# Patient Record
Sex: MALE | Race: WHITE | Employment: OTHER | URBAN - METROPOLITAN AREA
[De-identification: names, ages, dates, MRNs, and addresses within clinical notes are randomized per-mention and may not be internally consistent; named-entity substitution may affect disease eponyms.]

---

## 2020-09-03 ENCOUNTER — OFFICE VISIT (OUTPATIENT)
Dept: FAMILY MEDICINE CLINIC | Facility: CLINIC | Age: 75
End: 2020-09-03
Payer: MEDICARE

## 2020-09-03 VITALS
TEMPERATURE: 97 F | HEART RATE: 80 BPM | DIASTOLIC BLOOD PRESSURE: 64 MMHG | SYSTOLIC BLOOD PRESSURE: 102 MMHG | HEIGHT: 70.5 IN | RESPIRATION RATE: 20 BRPM | BODY MASS INDEX: 29.5 KG/M2 | WEIGHT: 208.38 LBS

## 2020-09-03 DIAGNOSIS — S61.412A LACERATION OF LEFT HAND WITHOUT FOREIGN BODY, INITIAL ENCOUNTER: ICD-10-CM

## 2020-09-03 DIAGNOSIS — E11.9 TYPE 2 DIABETES MELLITUS WITHOUT COMPLICATION, WITHOUT LONG-TERM CURRENT USE OF INSULIN (HCC): Primary | ICD-10-CM

## 2020-09-03 DIAGNOSIS — S60.222A TRAUMATIC HEMATOMA OF HAND, LEFT, INITIAL ENCOUNTER: ICD-10-CM

## 2020-09-03 LAB
ALBUMIN SERPL-MCNC: 3.7 G/DL (ref 3.4–5)
ALBUMIN/GLOB SERPL: 1.1 {RATIO} (ref 1–2)
ALP LIVER SERPL-CCNC: 56 U/L (ref 45–117)
ALT SERPL-CCNC: 58 U/L (ref 16–61)
ANION GAP SERPL CALC-SCNC: 7 MMOL/L (ref 0–18)
AST SERPL-CCNC: 37 U/L (ref 15–37)
BILIRUB SERPL-MCNC: 0.4 MG/DL (ref 0.1–2)
BUN BLD-MCNC: 20 MG/DL (ref 7–18)
BUN/CREAT SERPL: 15.2 (ref 10–20)
CALCIUM BLD-MCNC: 9.8 MG/DL (ref 8.5–10.1)
CHLORIDE SERPL-SCNC: 105 MMOL/L (ref 98–112)
CHOLEST SMN-MCNC: 152 MG/DL (ref ?–200)
CO2 SERPL-SCNC: 23 MMOL/L (ref 21–32)
CREAT BLD-MCNC: 1.32 MG/DL (ref 0.7–1.3)
EST. AVERAGE GLUCOSE BLD GHB EST-MCNC: 143 MG/DL (ref 68–126)
GLOBULIN PLAS-MCNC: 3.5 G/DL (ref 2.8–4.4)
GLUCOSE BLD-MCNC: 133 MG/DL (ref 70–99)
HBA1C MFR BLD HPLC: 6.6 % (ref ?–5.7)
HDLC SERPL-MCNC: 32 MG/DL (ref 40–59)
LDLC SERPL CALC-MCNC: 77 MG/DL (ref ?–100)
M PROTEIN MFR SERPL ELPH: 7.2 G/DL (ref 6.4–8.2)
NONHDLC SERPL-MCNC: 120 MG/DL (ref ?–130)
OSMOLALITY SERPL CALC.SUM OF ELEC: 285 MOSM/KG (ref 275–295)
PATIENT FASTING Y/N/NP: YES
PATIENT FASTING Y/N/NP: YES
POTASSIUM SERPL-SCNC: 4.2 MMOL/L (ref 3.5–5.1)
SODIUM SERPL-SCNC: 135 MMOL/L (ref 136–145)
TRIGL SERPL-MCNC: 213 MG/DL (ref 30–149)
TSI SER-ACNC: 2.46 MIU/ML (ref 0.36–3.74)
VLDLC SERPL CALC-MCNC: 43 MG/DL (ref 0–30)

## 2020-09-03 PROCEDURE — 80061 LIPID PANEL: CPT | Performed by: FAMILY MEDICINE

## 2020-09-03 PROCEDURE — 80053 COMPREHEN METABOLIC PANEL: CPT | Performed by: FAMILY MEDICINE

## 2020-09-03 PROCEDURE — 99203 OFFICE O/P NEW LOW 30 MIN: CPT | Performed by: FAMILY MEDICINE

## 2020-09-03 PROCEDURE — 83036 HEMOGLOBIN GLYCOSYLATED A1C: CPT | Performed by: FAMILY MEDICINE

## 2020-09-03 PROCEDURE — 84443 ASSAY THYROID STIM HORMONE: CPT | Performed by: FAMILY MEDICINE

## 2020-09-03 RX ORDER — CHLORAL HYDRATE 500 MG
1 CAPSULE ORAL DAILY
COMMUNITY

## 2020-09-03 RX ORDER — FENOFIBRATE 160 MG/1
160 TABLET ORAL EVERY EVENING
COMMUNITY
End: 2021-02-13

## 2020-09-03 RX ORDER — PHENOL 1.4 %
2 AEROSOL, SPRAY (ML) MUCOUS MEMBRANE NIGHTLY
COMMUNITY

## 2020-09-03 RX ORDER — ROPINIROLE 5 MG/1
5 TABLET, FILM COATED ORAL 3 TIMES DAILY
COMMUNITY

## 2020-09-03 RX ORDER — MAG HYDROX/ALUMINUM HYD/SIMETH 400-400-40
1 SUSPENSION, ORAL (FINAL DOSE FORM) ORAL 2 TIMES DAILY
COMMUNITY
End: 2021-02-16

## 2020-09-03 RX ORDER — GLIPIZIDE 5 MG/1
10 TABLET ORAL
COMMUNITY
End: 2021-04-01

## 2020-09-03 RX ORDER — TAMSULOSIN HYDROCHLORIDE 0.4 MG/1
0.4 CAPSULE ORAL EVERY EVENING
COMMUNITY

## 2020-09-03 RX ORDER — ROSUVASTATIN CALCIUM 20 MG/1
10 TABLET, COATED ORAL NIGHTLY
COMMUNITY
End: 2021-06-16

## 2020-09-03 RX ORDER — DULOXETIN HYDROCHLORIDE 30 MG/1
30 CAPSULE, DELAYED RELEASE ORAL 2 TIMES DAILY
COMMUNITY
End: 2021-02-18

## 2020-09-03 NOTE — PROGRESS NOTES
Jaja Chacon is a 76year old male. HPI:   Farrukh Dunn is here for evaluation of a wound he sustained a laceration to his left hand after he cut it with a knife, while trying to cut a piece of plastic was seen at [facility] ER and placed on ABX, and given a Tdap. Resp 20   Ht 70.5\"   Wt 208 lb 6.4 oz (94.5 kg)   BMI 29.48 kg/m²   GENERAL: well developed, well nourished,in no apparent distress  SKIN: has a healed laceration of the left palm between the thumb and the index finger  EXTREMITIES: no cyanosis, clubbin

## 2020-09-04 ENCOUNTER — TELEPHONE (OUTPATIENT)
Dept: FAMILY MEDICINE CLINIC | Facility: CLINIC | Age: 75
End: 2020-09-04

## 2020-09-04 DIAGNOSIS — E11.9 TYPE 2 DIABETES MELLITUS WITHOUT COMPLICATION, WITHOUT LONG-TERM CURRENT USE OF INSULIN (HCC): Primary | ICD-10-CM

## 2020-09-04 NOTE — TELEPHONE ENCOUNTER
----- Message from Vickie Morris DO sent at 9/4/2020  8:10 AM CDT -----  Can notify Sujit Garcia, his labs actually look good his BS control is excellent, cholesterol looks good, TG could be better, so let’s watch our fatty and fried foods.  Kidney function is acce

## 2020-10-08 ENCOUNTER — OFFICE VISIT (OUTPATIENT)
Dept: FAMILY MEDICINE CLINIC | Facility: CLINIC | Age: 75
End: 2020-10-08
Payer: MEDICARE

## 2020-10-08 ENCOUNTER — MED REC SCAN ONLY (OUTPATIENT)
Dept: FAMILY MEDICINE CLINIC | Facility: CLINIC | Age: 75
End: 2020-10-08

## 2020-10-08 ENCOUNTER — HOSPITAL ENCOUNTER (OUTPATIENT)
Dept: GENERAL RADIOLOGY | Age: 75
Discharge: HOME | End: 2020-10-08
Attending: FAMILY MEDICINE
Payer: MEDICARE

## 2020-10-08 VITALS
HEART RATE: 84 BPM | RESPIRATION RATE: 20 BRPM | WEIGHT: 212.63 LBS | BODY MASS INDEX: 30.1 KG/M2 | DIASTOLIC BLOOD PRESSURE: 80 MMHG | TEMPERATURE: 97 F | HEIGHT: 70.5 IN | SYSTOLIC BLOOD PRESSURE: 122 MMHG

## 2020-10-08 DIAGNOSIS — G89.29 CHRONIC PAIN OF LEFT KNEE: ICD-10-CM

## 2020-10-08 DIAGNOSIS — G89.29 CHRONIC MIDLINE LOW BACK PAIN WITH BILATERAL SCIATICA: ICD-10-CM

## 2020-10-08 DIAGNOSIS — E11.42 DIABETIC POLYNEUROPATHY ASSOCIATED WITH TYPE 2 DIABETES MELLITUS (HCC): ICD-10-CM

## 2020-10-08 DIAGNOSIS — M25.562 CHRONIC PAIN OF LEFT KNEE: ICD-10-CM

## 2020-10-08 DIAGNOSIS — M54.41 CHRONIC MIDLINE LOW BACK PAIN WITH BILATERAL SCIATICA: ICD-10-CM

## 2020-10-08 DIAGNOSIS — M54.42 CHRONIC MIDLINE LOW BACK PAIN WITH BILATERAL SCIATICA: ICD-10-CM

## 2020-10-08 DIAGNOSIS — N21.0 BLADDER STONES: Primary | ICD-10-CM

## 2020-10-08 DIAGNOSIS — R26.81 GAIT INSTABILITY: ICD-10-CM

## 2020-10-08 DIAGNOSIS — Z87.442 HISTORY OF KIDNEY STONES: Primary | ICD-10-CM

## 2020-10-08 DIAGNOSIS — G25.81 RESTLESS LEGS: ICD-10-CM

## 2020-10-08 DIAGNOSIS — N40.1 BENIGN PROSTATIC HYPERPLASIA WITH NOCTURIA: ICD-10-CM

## 2020-10-08 DIAGNOSIS — R35.1 BENIGN PROSTATIC HYPERPLASIA WITH NOCTURIA: ICD-10-CM

## 2020-10-08 PROBLEM — E11.9 CONTROLLED TYPE 2 DIABETES MELLITUS WITHOUT COMPLICATION, WITHOUT LONG-TERM CURRENT USE OF INSULIN (HCC): Status: ACTIVE | Noted: 2020-10-08

## 2020-10-08 PROBLEM — Z72.0 TOBACCO ABUSE: Status: ACTIVE | Noted: 2020-10-08

## 2020-10-08 PROBLEM — E78.2 MIXED HYPERLIPIDEMIA: Status: ACTIVE | Noted: 2020-10-08

## 2020-10-08 PROCEDURE — 72110 X-RAY EXAM L-2 SPINE 4/>VWS: CPT | Performed by: FAMILY MEDICINE

## 2020-10-08 PROCEDURE — G0008 ADMIN INFLUENZA VIRUS VAC: HCPCS | Performed by: FAMILY MEDICINE

## 2020-10-08 PROCEDURE — 99215 OFFICE O/P EST HI 40 MIN: CPT | Performed by: FAMILY MEDICINE

## 2020-10-08 PROCEDURE — 90662 IIV NO PRSV INCREASED AG IM: CPT | Performed by: FAMILY MEDICINE

## 2020-10-08 PROCEDURE — 73560 X-RAY EXAM OF KNEE 1 OR 2: CPT | Performed by: FAMILY MEDICINE

## 2020-10-08 NOTE — PROGRESS NOTES
Carlos Adams is a 76year old male. HPI:   Sanju Garcia has a long and slightly complicated HS recurrent UTI, bladder stones, colonized with Pseudomonas, and had seen ID who said not to treat unless symptomatic. He has some balance issues and a ?  Of park REVIEW OF SYSTEMS:   GENERAL HEALTH: feels well otherwise   HEENT: hearing loss  SKIN: denies any unusual skin lesions or rashes  RESPIRATORY:  shortness of breath with exertion, is still smoking  CARDIOVASCULAR: denies chest pain on exertion  GI: keyshawn encounter       Imaging & Consults:  FLU VACC HIGH DOSE PRSV FREE  OP REFERRAL TO [facility]     The patient indicates understanding of these issues and agrees to the plan.   The patient is asked to return in after we get his imagine ba

## 2020-10-15 ENCOUNTER — TELEPHONE (OUTPATIENT)
Dept: ORTHOPEDICS CLINIC | Facility: CLINIC | Age: 75
End: 2020-10-15

## 2020-10-15 DIAGNOSIS — M25.562 LEFT KNEE PAIN, UNSPECIFIED CHRONICITY: Primary | ICD-10-CM

## 2020-10-15 NOTE — TELEPHONE ENCOUNTER
Patient self scheduled for left knee and left hand pain. He has a left knee x-rays from 10. 8.20 and no imaging of hand. Please let me know if he needs additional imaging for his knee.  Let me know if you want me to add hand as a chief complaint, if not I I

## 2020-10-16 ENCOUNTER — HOSPITAL ENCOUNTER (OUTPATIENT)
Dept: GENERAL RADIOLOGY | Age: 75
Discharge: HOME | End: 2020-10-16
Attending: NURSE PRACTITIONER
Payer: MEDICARE

## 2020-10-16 ENCOUNTER — OFFICE VISIT (OUTPATIENT)
Dept: ORTHOPEDICS CLINIC | Facility: CLINIC | Age: 75
End: 2020-10-16
Payer: MEDICARE

## 2020-10-16 VITALS — HEART RATE: 72 BPM | BODY MASS INDEX: 30 KG/M2 | HEIGHT: 70.5 IN | OXYGEN SATURATION: 98 % | RESPIRATION RATE: 18 BRPM

## 2020-10-16 DIAGNOSIS — M17.12 ARTHRITIS OF LEFT KNEE: Primary | ICD-10-CM

## 2020-10-16 DIAGNOSIS — M25.562 CHRONIC PAIN OF LEFT KNEE: ICD-10-CM

## 2020-10-16 DIAGNOSIS — M25.562 LEFT KNEE PAIN, UNSPECIFIED CHRONICITY: ICD-10-CM

## 2020-10-16 DIAGNOSIS — G89.29 CHRONIC PAIN OF LEFT KNEE: ICD-10-CM

## 2020-10-16 PROCEDURE — S0020 INJECTION, BUPIVICAINE HYDRO: HCPCS | Performed by: NURSE PRACTITIONER

## 2020-10-16 PROCEDURE — 20610 DRAIN/INJ JOINT/BURSA W/O US: CPT | Performed by: NURSE PRACTITIONER

## 2020-10-16 PROCEDURE — 73564 X-RAY EXAM KNEE 4 OR MORE: CPT | Performed by: NURSE PRACTITIONER

## 2020-10-16 PROCEDURE — 99203 OFFICE O/P NEW LOW 30 MIN: CPT | Performed by: NURSE PRACTITIONER

## 2020-10-16 RX ORDER — TRIAMCINOLONE ACETONIDE 40 MG/ML
40 INJECTION, SUSPENSION INTRA-ARTICULAR; INTRAMUSCULAR ONCE
Status: COMPLETED | OUTPATIENT
Start: 2020-10-16 | End: 2020-10-16

## 2020-10-16 RX ORDER — MELOXICAM 7.5 MG/1
7.5 TABLET ORAL DAILY
Qty: 30 TABLET | Refills: 0 | Status: SHIPPED | OUTPATIENT
Start: 2020-10-16 | End: 2020-11-15

## 2020-10-16 RX ADMIN — BUPIVACAINE HYDROCHLORIDE 10 MG: 5 INJECTION, SOLUTION PERINEURAL at 12:00:00

## 2020-10-16 RX ADMIN — TRIAMCINOLONE ACETONIDE 40 MG: 40 INJECTION, SUSPENSION INTRA-ARTICULAR; INTRAMUSCULAR at 12:00:00

## 2020-10-16 RX ADMIN — LIDOCAINE HYDROCHLORIDE 2 ML: 10 INJECTION, SOLUTION EPIDURAL; INFILTRATION; INTRACAUDAL; PERINEURAL at 12:00:00

## 2020-10-16 NOTE — PATIENT INSTRUCTIONS
What Is Arthritis? Arthritis is a disease that affects the joints. Joints are the parts where bones meet and move. It can affect any joint in your body. There are many types of arthritis.  They include:   · Osteoarthritis  · Rheumatoid arthritis  · Gout  · Cartilage is a smooth substance that protects the ends of your bones and provides cushioning. When you have arthritis, this cartilage breaks down and can no longer protect your bones. This can happen from an autoimmune disease.  Or it can happen from wear a · Strengthening muscles around the joint to reduce the strain on the joint  · Using hot and cold packs on your joints  · Using over-the-counter and prescription medicines  Talk with your healthcare provider about the best treatments for your condition. In people with arthritis, it offers all of those benefits and it can:  · Lessen pain and stiffness  · Strengthen muscles that support your joints  · Help you to be able to do the things you enjoy  A complete program consists of the following 3 types of exe Most people should exercise for at least 30 minutes, most days of the week. You don't have to exercise all at once. Try exercising for 10 minutes, 3 times a day, for example.     Strengthening exercises  Strengthening your muscles helps to protect your join ? Knee bend. Sit in a chair with your legs bent at the knees in front of you. Straighten one leg as much as you can, then bring it back to the floor. Repeat this 5 to 10 times. Then do the same thing with the other leg. ? Ankle stretch.  Sit with your fee · Large  for pencils, garden tools, and other handheld objects    Use mobility and other aids   People with arthritis and other joint problems often use mobility aids to help with walking. For example, they may use canes or walkers.  They may also use · Corticosteroid or steroid injections may ease swelling and pain. The medicine is injected into the joint—for example, the knee or hip. Steroid injections do have risks, so healthcare providers limit the number of injections used in any one joint.    · [place] location at [facility]: [facility] [address]; phone [phone]  o Website: [employer]      Date Last Reviewed: 6/1/2018  © 6014-3347 [employer] [address].  [address] [facility], [address]

## 2020-10-16 NOTE — PROGRESS NOTES
[facility] New Patient Note    CC: Patient presents with:  Knee Pain: Left knee pain 2 years     HPI: This 76year old male presents today with complaints of left knee pain for multiple years.   Patient states he recently moved to the [address] [facility] AND VOMIT[facility]  [place]               [place]  History reviewed. No pertinent family history.   Social History    Occupational History      Not on file    Tobacco Use      Smoking status: Current Some Day Smoker      Smokeless tobacco: Never Used    [place] however if he is never had it he may simply work on some strength and conditioning which could help alleviate some of his discomfort. He did verbalize understanding. We will see him back in approximately 6 weeks time.    I also instruction him that cortic

## 2020-10-29 ENCOUNTER — HOSPITAL ENCOUNTER (OUTPATIENT)
Dept: ULTRASOUND IMAGING | Age: 75
Discharge: HOME | End: 2020-10-29
Attending: FAMILY MEDICINE
Payer: MEDICARE

## 2020-10-29 DIAGNOSIS — Z87.442 HISTORY OF KIDNEY STONES: ICD-10-CM

## 2020-10-29 PROCEDURE — 76700 US EXAM ABDOM COMPLETE: CPT | Performed by: FAMILY MEDICINE

## 2020-10-30 PROBLEM — N20.0 KIDNEY STONE: Status: ACTIVE | Noted: 2020-10-30

## 2020-10-30 PROBLEM — Q61.02 MULTIPLE RENAL CYSTS: Status: ACTIVE | Noted: 2020-10-30

## 2020-10-30 PROBLEM — K76.0 HEPATIC STEATOSIS: Status: ACTIVE | Noted: 2020-10-30

## 2020-11-09 ENCOUNTER — TELEPHONE (OUTPATIENT)
Dept: PHYSICAL THERAPY | Facility: HOSPITAL | Age: 75
End: 2020-11-09

## 2020-11-09 ENCOUNTER — APPOINTMENT (OUTPATIENT)
Dept: PHYSICAL THERAPY | Age: 75
End: 2020-11-09
Attending: NURSE PRACTITIONER
Payer: MEDICARE

## 2020-11-09 ENCOUNTER — TELEPHONE (OUTPATIENT)
Dept: FAMILY MEDICINE CLINIC | Facility: CLINIC | Age: 75
End: 2020-11-09

## 2020-11-09 NOTE — TELEPHONE ENCOUNTER
Can we fax med list to Dr. Pratibha Nava office at [facility]. Fax number is [phone]. He has an appointment today.  Faxed to number given

## 2020-11-10 RX ORDER — LIDOCAINE HYDROCHLORIDE 10 MG/ML
2 INJECTION, SOLUTION EPIDURAL; INFILTRATION; INTRACAUDAL; PERINEURAL ONCE
Status: COMPLETED | OUTPATIENT
Start: 2020-10-16 | End: 2020-10-16

## 2020-11-10 RX ORDER — BUPIVACAINE HYDROCHLORIDE 5 MG/ML
2 INJECTION, SOLUTION PERINEURAL ONCE
Status: COMPLETED | OUTPATIENT
Start: 2020-10-16 | End: 2020-10-16

## 2020-11-12 ENCOUNTER — APPOINTMENT (OUTPATIENT)
Dept: PHYSICAL THERAPY | Age: 75
End: 2020-11-12
Attending: NURSE PRACTITIONER
Payer: MEDICARE

## 2020-11-16 ENCOUNTER — OFFICE VISIT (OUTPATIENT)
Dept: PHYSICAL THERAPY | Age: 75
End: 2020-11-16
Attending: NURSE PRACTITIONER
Payer: MEDICARE

## 2020-11-16 DIAGNOSIS — M25.562 CHRONIC PAIN OF LEFT KNEE: ICD-10-CM

## 2020-11-16 DIAGNOSIS — M17.12 ARTHRITIS OF LEFT KNEE: ICD-10-CM

## 2020-11-16 DIAGNOSIS — G89.29 CHRONIC PAIN OF LEFT KNEE: ICD-10-CM

## 2020-11-16 PROCEDURE — 97162 PT EVAL MOD COMPLEX 30 MIN: CPT

## 2020-11-16 PROCEDURE — 97110 THERAPEUTIC EXERCISES: CPT

## 2020-11-16 NOTE — PROGRESS NOTES
LOWER EXTREMITY EVALUATION:   Referring Physician: Dr. Ricky Milligan  Diagnosis: L knee pain     Date of Service: 11/16/2020     PATIENT Renetta Webster is a 76year old male who presents to therapy today with complaints of pain in L knee that ha performs HEP correctly without reported pain. Skilled Physical Therapy is medically necessary to address the above impairments and reach functional goals.      Precautions:  Drug Allergy  OBJECTIVE:   Observation: genu varus R  Palpation: No tenderness thro heel strike during gait and terminal knee extension in stance   · Pt will improve knee AROM flexion to >130 degrees to improve ability to perform stair descent safely   · Pt will improve quad strength to 5/5 to ascend 1 flight of stairs reciprocally withou

## 2020-11-19 ENCOUNTER — OFFICE VISIT (OUTPATIENT)
Dept: PHYSICAL THERAPY | Age: 75
End: 2020-11-19
Attending: NURSE PRACTITIONER
Payer: MEDICARE

## 2020-11-19 DIAGNOSIS — G89.29 CHRONIC PAIN OF LEFT KNEE: ICD-10-CM

## 2020-11-19 DIAGNOSIS — M25.562 CHRONIC PAIN OF LEFT KNEE: ICD-10-CM

## 2020-11-19 DIAGNOSIS — M17.12 ARTHRITIS OF LEFT KNEE: ICD-10-CM

## 2020-11-19 PROCEDURE — 97140 MANUAL THERAPY 1/> REGIONS: CPT

## 2020-11-19 PROCEDURE — 97110 THERAPEUTIC EXERCISES: CPT

## 2020-11-19 NOTE — PROGRESS NOTES
Dx: L knee OA         Insurance (Authorized # of Visits):  Med necessity            Authorizing Physician: Dr. Jose David Martínez  Next MD visit: none scheduled  Fall Risk: standard         Precautions: n/a             Subjective:  Increased pain with any motion, any david    Charges: man therapy, therapeutic ex 2       Total Timed Treatment: 45 min  Total Treatment Time: 45 min

## 2020-11-20 ENCOUNTER — TELEPHONE (OUTPATIENT)
Dept: PHYSICAL THERAPY | Age: 75
End: 2020-11-20

## 2020-11-20 ENCOUNTER — OFFICE VISIT (OUTPATIENT)
Dept: ORTHOPEDICS CLINIC | Facility: CLINIC | Age: 75
End: 2020-11-20
Payer: MEDICARE

## 2020-11-20 VITALS — BODY MASS INDEX: 30 KG/M2 | RESPIRATION RATE: 18 BRPM | HEIGHT: 70.5 IN | HEART RATE: 75 BPM | OXYGEN SATURATION: 97 %

## 2020-11-20 DIAGNOSIS — M25.562 CHRONIC PAIN OF LEFT KNEE: ICD-10-CM

## 2020-11-20 DIAGNOSIS — G89.29 CHRONIC PAIN OF LEFT KNEE: ICD-10-CM

## 2020-11-20 DIAGNOSIS — M17.11 PRIMARY OSTEOARTHRITIS OF RIGHT KNEE: Primary | ICD-10-CM

## 2020-11-20 PROCEDURE — 99213 OFFICE O/P EST LOW 20 MIN: CPT | Performed by: NURSE PRACTITIONER

## 2020-11-20 NOTE — PROGRESS NOTES
Progress Summary  Pt has attended 2 visits in Physical Therapy.    Dx: L knee OA, PD, gait instability, falls        Insurance (Authorized # of Visits):  Med necessity            Authorizing Physician: Monse Huff MD visit: none scheduled  Fall Risk: sta will improve knee AROM flexion to >130 degrees to improve ability to perform stair descent safely   · Pt will improve quad strength to 5/5 to ascend 1 flight of stairs reciprocally without UE assist   · Pt will increase hip and knee strength to grossly 4+/ at Dept: [phone]. Sincerely,  Electronically signed by therapist: Martha Monique PT, DPT     Physician's certification required:  Yes  Please co-sign or sign and return this letter via fax as soon as possible to [phone].    I certify the need

## 2020-11-20 NOTE — PROGRESS NOTES
[facility] Progress Note    CC: Patient presents with: Follow - Up: Left knee pain    HPI: This 76year old male presents today upon his left knee.   We gave him a corticosteroid injection which had a good response but it was limited to just a number injection at the earliest mutual convenience. The above note was creating using Dragon speech recognition technology. Please excuse any typos.     Bhupendra Akers, [address] [facility] Orthopedic Surgery

## 2020-11-23 ENCOUNTER — APPOINTMENT (OUTPATIENT)
Dept: PHYSICAL THERAPY | Age: 75
End: 2020-11-23
Attending: NURSE PRACTITIONER
Payer: MEDICARE

## 2020-11-23 ENCOUNTER — OFFICE VISIT (OUTPATIENT)
Dept: PHYSICAL THERAPY | Age: 75
End: 2020-11-23
Attending: FAMILY MEDICINE
Payer: MEDICARE

## 2020-11-23 PROCEDURE — 97112 NEUROMUSCULAR REEDUCATION: CPT

## 2020-11-23 PROCEDURE — 97110 THERAPEUTIC EXERCISES: CPT

## 2020-11-24 ENCOUNTER — TELEPHONE (OUTPATIENT)
Dept: ORTHOPEDICS CLINIC | Facility: CLINIC | Age: 75
End: 2020-11-24

## 2020-11-24 ENCOUNTER — OFFICE VISIT (OUTPATIENT)
Dept: PHYSICAL THERAPY | Age: 75
End: 2020-11-24
Attending: FAMILY MEDICINE
Payer: MEDICARE

## 2020-11-24 PROCEDURE — 97110 THERAPEUTIC EXERCISES: CPT

## 2020-11-24 PROCEDURE — 97112 NEUROMUSCULAR REEDUCATION: CPT

## 2020-11-24 NOTE — PROGRESS NOTES
Dx: L knee OA, PD, gait instability, falls        Insurance (Authorized # of Visits):  Med necessity            Authorizing Physician: Adebayo Huff MD visit: none scheduled  Fall Risk: standard         Precautions: n/a             Subjective: Pt states he for improved amplitude of movement and reduced fall risk. Date:   11/ 23/20            TX#: 1/10 Date:  11/24/20              TX#: 2/10 Date:                 TX#: 3/ Date:    Tx#: 4/   THERE EX:  Nu step warm up level 3 10 mins THERE EX:  Nu step warm up l

## 2020-11-25 ENCOUNTER — APPOINTMENT (OUTPATIENT)
Dept: PHYSICAL THERAPY | Age: 75
End: 2020-11-25
Attending: NURSE PRACTITIONER
Payer: MEDICARE

## 2020-11-30 ENCOUNTER — APPOINTMENT (OUTPATIENT)
Dept: PHYSICAL THERAPY | Age: 75
End: 2020-11-30
Attending: NURSE PRACTITIONER
Payer: MEDICARE

## 2020-11-30 ENCOUNTER — TELEPHONE (OUTPATIENT)
Dept: ORTHOPEDICS CLINIC | Facility: CLINIC | Age: 75
End: 2020-11-30

## 2020-11-30 NOTE — TELEPHONE ENCOUNTER
Patient's daughter is calling wanting to know if Dr. Karina Henry or his nurse can discuss patients plan of care from his [address] [place] [address] Avenue on 11/20/20. Mehrdad First says that he is having trouble walking and memory isn't that great.

## 2020-12-01 ENCOUNTER — OFFICE VISIT (OUTPATIENT)
Dept: PHYSICAL THERAPY | Age: 75
End: 2020-12-01
Attending: FAMILY MEDICINE
Payer: MEDICARE

## 2020-12-01 PROCEDURE — 97112 NEUROMUSCULAR REEDUCATION: CPT

## 2020-12-01 PROCEDURE — 97110 THERAPEUTIC EXERCISES: CPT

## 2020-12-01 NOTE — TELEPHONE ENCOUNTER
Spoke with patient's daughter and notified her that PATO Garrison recommended a Synvisc One injection for her father. Pt's daughter notified he can be scheduled for the injection. Appt set. Location confirmed. No further questions at this time.

## 2020-12-01 NOTE — PROGRESS NOTES
Dx: L knee OA, PD, gait instability, falls        Insurance (Authorized # of Visits):  Med necessity            Authorizing Physician: Cong Huff MD visit: none scheduled  Fall Risk: standard         Precautions: n/a             Subjective: Pt states hi assessment. Plan: Progress LSVT/Callibration exercises for improved amplitude of movement and reduced fall risk. Address gait efficiency/family training next visit.   Date:   11/ 23/20            TX#: 1/10 Date:  11/24/20              TX#: 2/10 Date: 12

## 2020-12-03 ENCOUNTER — APPOINTMENT (OUTPATIENT)
Dept: PHYSICAL THERAPY | Age: 75
End: 2020-12-03
Attending: NURSE PRACTITIONER
Payer: MEDICARE

## 2020-12-03 ENCOUNTER — TELEPHONE (OUTPATIENT)
Dept: PHYSICAL THERAPY | Age: 75
End: 2020-12-03

## 2020-12-03 ENCOUNTER — APPOINTMENT (OUTPATIENT)
Dept: PHYSICAL THERAPY | Age: 75
End: 2020-12-03
Attending: FAMILY MEDICINE
Payer: MEDICARE

## 2020-12-04 ENCOUNTER — TELEPHONE (OUTPATIENT)
Dept: FAMILY MEDICINE CLINIC | Facility: CLINIC | Age: 75
End: 2020-12-04

## 2020-12-04 ENCOUNTER — MED REC SCAN ONLY (OUTPATIENT)
Dept: FAMILY MEDICINE CLINIC | Facility: CLINIC | Age: 75
End: 2020-12-04

## 2020-12-04 ENCOUNTER — OFFICE VISIT (OUTPATIENT)
Dept: ORTHOPEDICS CLINIC | Facility: CLINIC | Age: 75
End: 2020-12-04
Payer: MEDICARE

## 2020-12-04 VITALS — BODY MASS INDEX: 31 KG/M2 | HEART RATE: 74 BPM | RESPIRATION RATE: 16 BRPM | HEIGHT: 70 IN | OXYGEN SATURATION: 99 %

## 2020-12-04 DIAGNOSIS — M25.562 LEFT KNEE PAIN, UNSPECIFIED CHRONICITY: ICD-10-CM

## 2020-12-04 DIAGNOSIS — M17.12 ARTHRITIS OF LEFT KNEE: Primary | ICD-10-CM

## 2020-12-04 PROCEDURE — 20610 DRAIN/INJ JOINT/BURSA W/O US: CPT | Performed by: NURSE PRACTITIONER

## 2020-12-04 NOTE — PROCEDURES
Patient is here today for his Synvisc 1 injection. We have this approved through his insurance and he would like to proceed with this today. He states that his knee is still painful and he is very much hoping that this gives him some symptomatic relief.

## 2020-12-04 NOTE — TELEPHONE ENCOUNTER
Kyung report received and reviewed by Dr. Wanda Us. This was normal. Health maintenance was updated. Ext result documented. Sent to scanning. Pt notified via detailed message on pt's voicemail.

## 2020-12-07 ENCOUNTER — OFFICE VISIT (OUTPATIENT)
Dept: PHYSICAL THERAPY | Age: 75
End: 2020-12-07
Attending: NURSE PRACTITIONER
Payer: MEDICARE

## 2020-12-07 DIAGNOSIS — G89.29 CHRONIC PAIN OF LEFT KNEE: ICD-10-CM

## 2020-12-07 DIAGNOSIS — M25.562 CHRONIC PAIN OF LEFT KNEE: ICD-10-CM

## 2020-12-07 DIAGNOSIS — M17.12 ARTHRITIS OF LEFT KNEE: ICD-10-CM

## 2020-12-07 PROCEDURE — 97112 NEUROMUSCULAR REEDUCATION: CPT

## 2020-12-07 PROCEDURE — 97110 THERAPEUTIC EXERCISES: CPT

## 2020-12-07 NOTE — PROGRESS NOTES
Dx: L knee OA, PD, gait instability, falls        Insurance (Authorized # of Visits):  Med necessity            Authorizing Physician: Yue Alvarez Next MD visit: none scheduled  Fall Risk: standard         Precautions: n/a             Subjective: Received inj visit.   Date:   11/ 23/20            TX#: 1/10 Date:  11/24/20              TX#: 2/10 Date: 12/1/20                 TX#: 3/10 Date: 12/7/2020  Tx#: 4/10   THERE EX:  Nu step warm up level 3 10 mins THERE EX:  Nu step warm up level 3 10 mins  Gastroc streth 45 min  Total Treatment Time: 45 min

## 2020-12-10 ENCOUNTER — APPOINTMENT (OUTPATIENT)
Dept: PHYSICAL THERAPY | Age: 75
End: 2020-12-10
Attending: NURSE PRACTITIONER
Payer: MEDICARE

## 2020-12-14 ENCOUNTER — OFFICE VISIT (OUTPATIENT)
Dept: PHYSICAL THERAPY | Age: 75
End: 2020-12-14
Attending: NURSE PRACTITIONER
Payer: MEDICARE

## 2020-12-14 DIAGNOSIS — M25.562 CHRONIC PAIN OF LEFT KNEE: ICD-10-CM

## 2020-12-14 DIAGNOSIS — G89.29 CHRONIC PAIN OF LEFT KNEE: ICD-10-CM

## 2020-12-14 DIAGNOSIS — M17.12 ARTHRITIS OF LEFT KNEE: ICD-10-CM

## 2020-12-14 PROCEDURE — 97112 NEUROMUSCULAR REEDUCATION: CPT

## 2020-12-14 PROCEDURE — 97110 THERAPEUTIC EXERCISES: CPT

## 2020-12-14 NOTE — PROGRESS NOTES
Dx: L knee OA, PD, gait instability, falls        Insurance (Authorized # of Visits):  Med necessity            Authorizing Physician: Freddie March Next MD visit: none scheduled  Fall Risk: standard         Precautions: n/a             Subjective: Injection wo 2/10 Date: 12/1/20                 TX#: 3/10 Date: 12/7/2020  Tx#: 4/10 Date: 12/14/2020  Tx#: 5/10   THERE EX:  Nu step warm up level 3 10 mins THERE EX:  Nu step warm up level 3 10 mins  Gastroc strethc BLEs incline board 30 secs 3x  THERE EX:  Nu step w secs max demo and shaping and modeling  10x each   10x BIG STS mod cues  Forward, lateral and backward stomp single UE support max demo  10x each LE single UE support on chair back  Rock and reach 10x2 each side single UE support   BIG TWIST standing singl

## 2020-12-17 ENCOUNTER — OFFICE VISIT (OUTPATIENT)
Dept: PHYSICAL THERAPY | Age: 75
End: 2020-12-17
Attending: NURSE PRACTITIONER
Payer: MEDICARE

## 2020-12-17 DIAGNOSIS — M17.12 ARTHRITIS OF LEFT KNEE: ICD-10-CM

## 2020-12-17 DIAGNOSIS — M25.562 CHRONIC PAIN OF LEFT KNEE: ICD-10-CM

## 2020-12-17 DIAGNOSIS — G89.29 CHRONIC PAIN OF LEFT KNEE: ICD-10-CM

## 2020-12-17 PROCEDURE — 97112 NEUROMUSCULAR REEDUCATION: CPT

## 2020-12-17 PROCEDURE — 97110 THERAPEUTIC EXERCISES: CPT

## 2020-12-17 NOTE — PROGRESS NOTES
Dx: L knee OA, PD, gait instability, falls        Insurance (Authorized # of Visits):  Med necessity            Authorizing Physician: Rafat Gonzalez Next MD visit: none scheduled  Fall Risk: standard         Precautions: n/a             Subjective: Knee really efficiency/family training next visit.   Date:   11/ 23/20            TX#: 1/10 Date:  11/24/20              TX#: 2/10 Date: 12/1/20                 TX#: 3/10 Date: 12/7/2020  Tx#: 4/10 Date: 12/14/2020  Tx#: 5/10 Date: 12/17/2020  Tx#: 6/10   THERE EX:  Nu reach 10x2 each side single UE support   BIG TWIST standing single UE support 10x each side* max demo  Gait following there ex 110ft, with SPC min cues for posture NEURO RE ED:  LSVT 1 and 2 seated twist and reach sustained hold 10 secs max demo and shapin

## 2020-12-21 ENCOUNTER — APPOINTMENT (OUTPATIENT)
Dept: PHYSICAL THERAPY | Age: 75
End: 2020-12-21
Attending: NURSE PRACTITIONER
Payer: MEDICARE

## 2020-12-21 ENCOUNTER — TELEPHONE (OUTPATIENT)
Dept: PHYSICAL THERAPY | Facility: HOSPITAL | Age: 75
End: 2020-12-21

## 2020-12-28 ENCOUNTER — OFFICE VISIT (OUTPATIENT)
Dept: PHYSICAL THERAPY | Age: 75
End: 2020-12-28
Attending: NURSE PRACTITIONER
Payer: MEDICARE

## 2020-12-28 DIAGNOSIS — G89.29 CHRONIC PAIN OF LEFT KNEE: ICD-10-CM

## 2020-12-28 DIAGNOSIS — M25.562 CHRONIC PAIN OF LEFT KNEE: ICD-10-CM

## 2020-12-28 DIAGNOSIS — M17.12 ARTHRITIS OF LEFT KNEE: ICD-10-CM

## 2020-12-28 PROCEDURE — 97110 THERAPEUTIC EXERCISES: CPT

## 2020-12-28 PROCEDURE — 97112 NEUROMUSCULAR REEDUCATION: CPT

## 2020-12-28 RX ORDER — BUDESONIDE AND FORMOTEROL FUMARATE DIHYDRATE 160; 4.5 UG/1; UG/1
2 AEROSOL RESPIRATORY (INHALATION) 2 TIMES DAILY
Qty: 1 INHALER | Refills: 1 | Status: SHIPPED | OUTPATIENT
Start: 2020-12-28 | End: 2021-06-21

## 2020-12-28 NOTE — TELEPHONE ENCOUNTER
Symbicort 160/4.5      Pt would like refill sent to   [address] [place] [address] [phone], [phone]

## 2020-12-28 NOTE — PROGRESS NOTES
Dx: L knee OA, PD, gait instability, falls        Insurance (Authorized # of Visits):  Med necessity            Authorizing Physician: Cong Valentin Next MD visit: none scheduled  Fall Risk: standard         Precautions: n/a             Subjective: Knee hasn't 12/14/2020  Tx#: 5/10 Date: 12/17/2020  Tx#: 6/10 Date: 12/28/2020  Tx#: 7/10   THERE EX:  Nu step warm up level 3 10 mins  Gastroc strethc BLEs incline board 30 secs 3x  THERE EX:  Nu step warm up level 3 10 mins  Gastroc strethc BLEs incline board 30 sec 10x each   10x BIG STS mod cues  Forward, lateral and backward stomp single UE support max demo  10x each LE single UE support on chair back  Rock and reach 10x2 each side single UE support   BIG TWIST standing single UE support 10x each side* max demo  F

## 2020-12-30 ENCOUNTER — TELEPHONE (OUTPATIENT)
Dept: FAMILY MEDICINE CLINIC | Facility: CLINIC | Age: 75
End: 2020-12-30

## 2020-12-30 DIAGNOSIS — Z20.822 EXPOSURE TO COVID-19 VIRUS: Primary | ICD-10-CM

## 2020-12-30 NOTE — TELEPHONE ENCOUNTER
Daughter called pt was exposed to [address] no SXS at at this time    Pt was in contact with COVID positive pt- last exposure was yesterday.   That person developed sxs yesterday- found out they were positive today    Pt was not masked when in contact

## 2020-12-31 ENCOUNTER — LAB ENCOUNTER (OUTPATIENT)
Dept: LAB | Age: 75
End: 2020-12-31
Attending: FAMILY MEDICINE
Payer: MEDICARE

## 2020-12-31 ENCOUNTER — APPOINTMENT (OUTPATIENT)
Dept: PHYSICAL THERAPY | Age: 75
End: 2020-12-31
Attending: NURSE PRACTITIONER
Payer: MEDICARE

## 2020-12-31 DIAGNOSIS — Z20.822 EXPOSURE TO COVID-19 VIRUS: ICD-10-CM

## 2021-01-01 ENCOUNTER — EXTERNAL RECORD (OUTPATIENT)
Dept: OTHER | Age: 76
End: 2021-01-01

## 2021-01-02 LAB — SARS-COV-2 BY PCR: NOT DETECTED

## 2021-01-23 ENCOUNTER — OFFICE VISIT (OUTPATIENT)
Dept: ORTHOPEDICS CLINIC | Facility: CLINIC | Age: 76
End: 2021-01-23
Payer: MEDICARE

## 2021-01-23 VITALS — WEIGHT: 207.63 LBS | HEART RATE: 84 BPM | BODY MASS INDEX: 29.07 KG/M2 | OXYGEN SATURATION: 96 % | HEIGHT: 71 IN

## 2021-01-23 DIAGNOSIS — Z72.0 TOBACCO USE: ICD-10-CM

## 2021-01-23 DIAGNOSIS — M17.12 ARTHRITIS OF LEFT KNEE: Primary | ICD-10-CM

## 2021-01-23 PROCEDURE — 99213 OFFICE O/P EST LOW 20 MIN: CPT | Performed by: ORTHOPAEDIC SURGERY

## 2021-01-23 PROCEDURE — 3008F BODY MASS INDEX DOCD: CPT | Performed by: ORTHOPAEDIC SURGERY

## 2021-01-23 PROCEDURE — 99406 BEHAV CHNG SMOKING 3-10 MIN: CPT | Performed by: ORTHOPAEDIC SURGERY

## 2021-01-23 NOTE — PATIENT INSTRUCTIONS
Kicking the Smoking Habit  If you smoke, quitting is one of the best changes you can make for your heart and your overall health. Smoking reduces oxygen flow to your heart by speeding the buildup of plaque and changing the health of your blood vessels.  Nehemiah Cervantes · Harm your lungs and cause problems with breathing. This includes emphysema and COPD (chronic obstructive pulmonary disease) . · Raise blood pressure. This raises your risk for heart attack or stroke. · Reduce blood flow.  This can slow healing and cause · Use the time between now and that date to arrange for support. · Tell people about your quit date. · Let your friends and family know how they can help you quit.        Keys to your quit plan  · Talk to your healthcare provider about prescription medici · Stay away from people or settings you link with smoking. · Make a survival kit that includes gum, mints, carrot sticks, and things to keep your hands busy.   · Talk to your healthcare provider about using quit-smoking products, such as medication or a ni Be careful with these products  Finding something to replace cigarettes may be hard to do. Some things may be as harmful as cigarettes.  These include:  · Smokeless (chewing) tobacco. This is just as harmful as regular tobacco. Tobacco should not be used as · Drink water. Try to drink 8 or more 8-ounce glasses of water a day. · Keep your hands busy. Wash your car. Draw. Do a puzzle. Build a indidebt. · Delay. The urge to smoke lasts only 3 to 5 minutes. · Keep your mouth busy.  Try chewing on fruits or veg Get fit, not fat  You may notice an increased appetite. Many people who quit smoking gain a few pounds. To limit weight gain, try to watch what you eat. Cut back on fat in your diet. Snack on low-calorie foods such as fresh fruits and vegetables.  Drink low You may slip and smoke again. Many ex-smokers slip on the way to success. If you do, it’s not the end of your quit process. Think about what triggered you to smoke. Then think of ways to prevent future slips. Ask yourself what you can learn from the slip. Quitting smoking is the most important step you can take to improve your health. We're glad you have set a goal to improve your health. Quit Smoking Resources    In addition to medications, use the STAR plan to help you successfully quit.    · Stick with

## 2021-01-23 NOTE — PROGRESS NOTES
[facility] Progress Note    Subjective: 71-year-old male seeing me for the first time, previously saw Syed WHYTE. Patient is being treated for arthritis of his left knee.   He reports that he has pain about the inside and outside of the knee elective operation reserved for when nonsurgical treatments no longer alleviate symptoms sufficiently.   In particular I discussed the importance of optimizing modifiable risk factors with considering knee replacement surgery, as patient reports that the no

## 2021-01-23 NOTE — PROGRESS NOTES
Tobacco Cessation Documentation (Smoking and Smokeless included): I had an in depth therapy session with Sandra Ohara about his tobacco use risks and options using the USPSTF's Five A's approach:    Ask: Marcus Govea is using tobacco products.   Assess:

## 2021-01-30 ENCOUNTER — TELEPHONE (OUTPATIENT)
Dept: FAMILY MEDICINE CLINIC | Facility: CLINIC | Age: 76
End: 2021-01-30

## 2021-02-01 DIAGNOSIS — Z23 NEED FOR VACCINATION: ICD-10-CM

## 2021-02-04 ENCOUNTER — IMMUNIZATION (OUTPATIENT)
Dept: LAB | Age: 76
End: 2021-02-04
Attending: HOSPITALIST
Payer: MEDICARE

## 2021-02-04 DIAGNOSIS — Z23 NEED FOR VACCINATION: Primary | ICD-10-CM

## 2021-02-04 PROCEDURE — 0001A SARSCOV2 VAC 30MCG/0.3ML IM: CPT

## 2021-02-10 ENCOUNTER — TELEPHONE (OUTPATIENT)
Dept: FAMILY MEDICINE CLINIC | Facility: CLINIC | Age: 76
End: 2021-02-10

## 2021-02-10 NOTE — TELEPHONE ENCOUNTER
Per DS -yes we can see him tomorrow for wellness, but we need to know if there is any special stuff we need to do for pre op    Will find out from Dr. Pj Ferrell office and message sent to [facility] as well

## 2021-02-10 NOTE — TELEPHONE ENCOUNTER
FYI    PT COMING IN TOMORROW FOR WELLNESS EXAM.  PT HAVING SURGERY (CARPEL TUNNEL SURGERY) ON 3/13 WITH DR Moctezuma Monday Yajaira Jeffery.     CAN WE DO PRE-OP AND WELLNESS TOGETHER    PLEASE ADV THE BOSS    THANK YOU

## 2021-02-11 ENCOUNTER — OFFICE VISIT (OUTPATIENT)
Dept: FAMILY MEDICINE CLINIC | Facility: CLINIC | Age: 76
End: 2021-02-11
Payer: MEDICARE

## 2021-02-11 VITALS
HEART RATE: 76 BPM | RESPIRATION RATE: 16 BRPM | TEMPERATURE: 98 F | BODY MASS INDEX: 30.21 KG/M2 | SYSTOLIC BLOOD PRESSURE: 110 MMHG | WEIGHT: 215.81 LBS | HEIGHT: 71 IN | DIASTOLIC BLOOD PRESSURE: 70 MMHG

## 2021-02-11 DIAGNOSIS — N20.0 KIDNEY STONE: ICD-10-CM

## 2021-02-11 DIAGNOSIS — K76.0 HEPATIC STEATOSIS: ICD-10-CM

## 2021-02-11 DIAGNOSIS — E78.2 MIXED HYPERLIPIDEMIA: ICD-10-CM

## 2021-02-11 DIAGNOSIS — R35.1 BENIGN PROSTATIC HYPERPLASIA WITH NOCTURIA: ICD-10-CM

## 2021-02-11 DIAGNOSIS — E11.42 DIABETIC POLYNEUROPATHY ASSOCIATED WITH TYPE 2 DIABETES MELLITUS (HCC): ICD-10-CM

## 2021-02-11 DIAGNOSIS — E11.22 CKD STAGE 3 DUE TO TYPE 2 DIABETES MELLITUS (HCC): ICD-10-CM

## 2021-02-11 DIAGNOSIS — J41.0 SMOKERS' COUGH (HCC): Chronic | ICD-10-CM

## 2021-02-11 DIAGNOSIS — Z00.00 MEDICARE ANNUAL WELLNESS VISIT, SUBSEQUENT: Primary | ICD-10-CM

## 2021-02-11 DIAGNOSIS — N40.1 BENIGN PROSTATIC HYPERPLASIA WITH NOCTURIA: ICD-10-CM

## 2021-02-11 DIAGNOSIS — Q61.02 MULTIPLE RENAL CYSTS: ICD-10-CM

## 2021-02-11 DIAGNOSIS — G25.81 RESTLESS LEGS: ICD-10-CM

## 2021-02-11 DIAGNOSIS — Z00.00 ENCOUNTER FOR ANNUAL HEALTH EXAMINATION: ICD-10-CM

## 2021-02-11 DIAGNOSIS — G89.29 CHRONIC PAIN OF LEFT KNEE: ICD-10-CM

## 2021-02-11 DIAGNOSIS — N21.0 BLADDER STONES: ICD-10-CM

## 2021-02-11 DIAGNOSIS — Z00.00 ENCOUNTER FOR MEDICARE ANNUAL WELLNESS EXAM: ICD-10-CM

## 2021-02-11 DIAGNOSIS — G56.03 CARPAL TUNNEL SYNDROME, BILATERAL: ICD-10-CM

## 2021-02-11 DIAGNOSIS — E11.9 CONTROLLED TYPE 2 DIABETES MELLITUS WITHOUT COMPLICATION, WITHOUT LONG-TERM CURRENT USE OF INSULIN (HCC): ICD-10-CM

## 2021-02-11 DIAGNOSIS — G89.29 CHRONIC MIDLINE LOW BACK PAIN WITH BILATERAL SCIATICA: ICD-10-CM

## 2021-02-11 DIAGNOSIS — Z13.6 SCREENING FOR CARDIOVASCULAR CONDITION: ICD-10-CM

## 2021-02-11 DIAGNOSIS — N18.30 CKD STAGE 3 DUE TO TYPE 2 DIABETES MELLITUS (HCC): ICD-10-CM

## 2021-02-11 DIAGNOSIS — M54.41 CHRONIC MIDLINE LOW BACK PAIN WITH BILATERAL SCIATICA: ICD-10-CM

## 2021-02-11 DIAGNOSIS — R26.81 GAIT INSTABILITY: ICD-10-CM

## 2021-02-11 DIAGNOSIS — M54.42 CHRONIC MIDLINE LOW BACK PAIN WITH BILATERAL SCIATICA: ICD-10-CM

## 2021-02-11 DIAGNOSIS — Z13.1 SCREENING FOR DIABETES MELLITUS (DM): ICD-10-CM

## 2021-02-11 DIAGNOSIS — Z23 NEED FOR VACCINATION: ICD-10-CM

## 2021-02-11 DIAGNOSIS — M25.562 CHRONIC PAIN OF LEFT KNEE: ICD-10-CM

## 2021-02-11 PROCEDURE — 3078F DIAST BP <80 MM HG: CPT | Performed by: FAMILY MEDICINE

## 2021-02-11 PROCEDURE — 3074F SYST BP LT 130 MM HG: CPT | Performed by: FAMILY MEDICINE

## 2021-02-11 PROCEDURE — G0439 PPPS, SUBSEQ VISIT: HCPCS | Performed by: FAMILY MEDICINE

## 2021-02-11 PROCEDURE — 90670 PCV13 VACCINE IM: CPT | Performed by: FAMILY MEDICINE

## 2021-02-11 PROCEDURE — 99397 PER PM REEVAL EST PAT 65+ YR: CPT | Performed by: FAMILY MEDICINE

## 2021-02-11 PROCEDURE — 96160 PT-FOCUSED HLTH RISK ASSMT: CPT | Performed by: FAMILY MEDICINE

## 2021-02-11 PROCEDURE — G0009 ADMIN PNEUMOCOCCAL VACCINE: HCPCS | Performed by: FAMILY MEDICINE

## 2021-02-11 PROCEDURE — 3008F BODY MASS INDEX DOCD: CPT | Performed by: FAMILY MEDICINE

## 2021-02-11 RX ORDER — BUDESONIDE AND FORMOTEROL FUMARATE DIHYDRATE 160; 4.5 UG/1; UG/1
2 AEROSOL RESPIRATORY (INHALATION) 2 TIMES DAILY
COMMUNITY
End: 2021-02-18

## 2021-02-11 NOTE — PROGRESS NOTES
HPI:   Todd Cardoso is a 76year old male who presents for a Medicare Subsequent Annual Wellness visit (Pt already had Initial Annual Wellness).     Hyun Iqbal is here today for his annual [address], he is a [address] served in the [employer] during [facility]. He  has a H problems based on screening of functional status. Hearing Problems?: Yes  He has Vision problems based on screening of functional status. Vision Problems? : No   He has Walking problems based on screening of functional status.    Difficulty walking?: [place] ([address]     Benign prostatic hyperplasia with nocturia     Bladder stones     Gait instability     Chronic midline low back pain with bilateral sciatica     Chronic pain of left knee     Hepatic steatosis     Multiple renal cysts     Kidney stone     CKD sta mouth nightly. •  Melatonin 10 MG Oral Tab, Take 2 tablets by mouth nightly. MEDICAL INFORMATION:   He  has no past medical history on file.     He  has a past surgical history that includes appendectomy; lumbar spine surgery; and transurethral elena Eyes Chart Acuity: 20/20   Able To Tolerate Visual Acuity: Yes      General Appearance:  Alert, cooperative, no distress, appears stated age   Head:  Normocephalic, without obvious abnormality, atraumatic   Eyes:  PERRL, conjunctiva/corneas clear, EOM's in (14); Future  -     TSH W REFLEX TO FREE T4; Future  -     HEMOGLOBIN A1C; Future  -     CBC WITH DIFFERENTIAL WITH PLATELET;  Future    Controlled type 2 diabetes mellitus without complication, without long-term current use of insulin ([facility]  -     Anabel Philip SCREEN; Future  -     LIPID PANEL; Future  -     COMP METABOLIC PANEL (14); Future  -     TSH W REFLEX TO FREE T4; Future  -     HEMOGLOBIN A1C; Future  -     CBC WITH DIFFERENTIAL WITH PLATELET;  Future    Encounter for Medicare annual wellness exam  - Electrocardiogram date    Colorectal Cancer Screening      Colonoscopy Screen every 10 years Colonoscopy due on 12/04/2023 Update Health Maintenance if applicable    Flex Sigmoidoscopy Screen every 10 years No results found for this or any previous visit. LDL  Annually LDL Cholesterol (mg/dL)   Date Value   09/03/2020 77    No flowsheet data found. Dilated Eye exam  Annually No flowsheet data found. No flowsheet data found.        COPD      Spirometry Testing Annually Spirometry date:  No flowsheet data

## 2021-02-11 NOTE — PATIENT INSTRUCTIONS
Torsten Valencia's SCREENING SCHEDULE   Tests on this list are recommended by your physician but may not be covered, or covered at this frequency, by your insurer. Please check with your insurance carrier before scheduling to verify coverage.     PREVENT years- more often if abnormal Colonoscopy due on 12/04/2023 Update Health Maintenance if applicable    Flex Sigmoidoscopy Screen  Covered every 5 years No results found for this or any previous visit. No flowsheet data found.      Fecal Occult Blood   Cover covered with your prescription benefits, but Medicare does not cover unless Medically needed    Zoster (Not covered by Medicare Part B) No orders found for this or any previous visit.  This may be covered with your pharmacy  prescription benefits     Recomm

## 2021-02-13 RX ORDER — FENOFIBRATE 160 MG/1
160 TABLET ORAL EVERY EVENING
Qty: 90 TABLET | Refills: 2 | Status: SHIPPED | OUTPATIENT
Start: 2021-02-13 | End: 2021-02-18

## 2021-02-13 NOTE — TELEPHONE ENCOUNTER
Last refill listed as historical  Last labs lipid 9/3/20  Last OV 2/11/21  Future Appointments   Date Time Provider Kris Beckman   2/25/2021 10:00 AM [facility] COVID VACCINE RESOURCE SB COVIDVAC [place]        Thank you.

## 2021-02-15 ENCOUNTER — TELEPHONE (OUTPATIENT)
Dept: FAMILY MEDICINE CLINIC | Facility: CLINIC | Age: 76
End: 2021-02-15

## 2021-02-15 NOTE — TELEPHONE ENCOUNTER
Daughter called- pt was in ED yesterday at [facility] has been updated    Pt was DX with UTI- started on Levaquin 500mg Daily and Prednisone 60mg daily    F/u visit scheduled with DS later this week    Routing to provider to review note

## 2021-02-16 RX ORDER — FINASTERIDE 5 MG/1
5 TABLET, FILM COATED ORAL DAILY
Qty: 90 TABLET | Refills: 2 | Status: SHIPPED | OUTPATIENT
Start: 2021-02-16 | End: 2021-11-05

## 2021-02-16 NOTE — TELEPHONE ENCOUNTER
DAUGHTER ADV ON Saturday PT NEEDS NEW MEDICATION FILLED,  NEEDS    FINASTERIDE 5 MG X 1 TIME PER DAY.     PLEASE SEND TO [facility]     THANK YOU     DOES NOT NEED FENOFIBRATE    PLEASE ADV IF ANY QUESTIONS    THANK YOU

## 2021-02-18 ENCOUNTER — OFFICE VISIT (OUTPATIENT)
Dept: FAMILY MEDICINE CLINIC | Facility: CLINIC | Age: 76
End: 2021-02-18
Payer: MEDICARE

## 2021-02-18 ENCOUNTER — TELEPHONE (OUTPATIENT)
Dept: FAMILY MEDICINE CLINIC | Facility: CLINIC | Age: 76
End: 2021-02-18

## 2021-02-18 VITALS
BODY MASS INDEX: 30 KG/M2 | DIASTOLIC BLOOD PRESSURE: 80 MMHG | HEART RATE: 84 BPM | RESPIRATION RATE: 28 BRPM | TEMPERATURE: 97 F | SYSTOLIC BLOOD PRESSURE: 128 MMHG | WEIGHT: 217.81 LBS

## 2021-02-18 DIAGNOSIS — N12 PYELONEPHRITIS: Primary | ICD-10-CM

## 2021-02-18 DIAGNOSIS — N40.1 BENIGN PROSTATIC HYPERPLASIA WITH NOCTURIA: ICD-10-CM

## 2021-02-18 DIAGNOSIS — R35.1 BENIGN PROSTATIC HYPERPLASIA WITH NOCTURIA: ICD-10-CM

## 2021-02-18 DIAGNOSIS — N21.0 BLADDER STONES: ICD-10-CM

## 2021-02-18 DIAGNOSIS — Z22.39 PSEUDOMONAS AERUGINOSA COLONIZATION: ICD-10-CM

## 2021-02-18 PROCEDURE — 1111F DSCHRG MED/CURRENT MED MERGE: CPT | Performed by: FAMILY MEDICINE

## 2021-02-18 PROCEDURE — 3079F DIAST BP 80-89 MM HG: CPT | Performed by: FAMILY MEDICINE

## 2021-02-18 PROCEDURE — 99214 OFFICE O/P EST MOD 30 MIN: CPT | Performed by: FAMILY MEDICINE

## 2021-02-18 PROCEDURE — 3074F SYST BP LT 130 MM HG: CPT | Performed by: FAMILY MEDICINE

## 2021-02-18 RX ORDER — LEVOFLOXACIN 500 MG/1
500 TABLET, FILM COATED ORAL DAILY
COMMUNITY
Start: 2021-02-15 | End: 2021-03-13

## 2021-02-18 RX ORDER — CLONAZEPAM 0.5 MG/1
0.5 TABLET ORAL NIGHTLY
COMMUNITY
Start: 2021-02-18

## 2021-02-18 RX ORDER — PREDNISONE 20 MG/1
60 TABLET ORAL DAILY
COMMUNITY
Start: 2021-02-15 | End: 2021-06-16

## 2021-02-18 RX ORDER — ATORVASTATIN CALCIUM 40 MG/1
1 TABLET, FILM COATED ORAL DAILY
COMMUNITY
Start: 2021-01-27

## 2021-02-18 NOTE — PROGRESS NOTES
Constantin Venegas is a 76year old male. HPI:   Vel Hill is here for hospital follow up after he was diagnosed with UTI and found to have pseudomonas.  He was febrile and confused at the time, has a HX Of bladder stones and kidney stones as well, he is feel Frequency: Monthly or less    Drug use: Not Currently       REVIEW OF SYSTEMS:   GENERAL HEALTH: feels well otherwise  SKIN: denies any unusual skin lesions or rashes  RESPIRATORY: denies shortness of breath with exertion  CARDIOVASCULAR: denies chest pain

## 2021-02-18 NOTE — TELEPHONE ENCOUNTER
Faxed a medical records request to:    [facility]  [address]  [facility] [address] [facility]    Phone [phone]  Fax [phone]    Original request in blue book. Copy made and sent with a barcode to scanning.
Principal Discharge DX:	Chest pain  Secondary Diagnosis:	Hand pain, right

## 2021-02-19 ENCOUNTER — TELEPHONE (OUTPATIENT)
Dept: FAMILY MEDICINE CLINIC | Facility: CLINIC | Age: 76
End: 2021-02-19

## 2021-02-19 ENCOUNTER — TELEPHONE (OUTPATIENT)
Dept: ORTHOPEDICS CLINIC | Facility: CLINIC | Age: 76
End: 2021-02-19

## 2021-02-19 DIAGNOSIS — N18.30 CKD STAGE 3 DUE TO TYPE 2 DIABETES MELLITUS (HCC): ICD-10-CM

## 2021-02-19 DIAGNOSIS — E11.22 CKD STAGE 3 DUE TO TYPE 2 DIABETES MELLITUS (HCC): ICD-10-CM

## 2021-02-19 DIAGNOSIS — E78.2 MIXED HYPERLIPIDEMIA: ICD-10-CM

## 2021-02-19 DIAGNOSIS — E11.9 CONTROLLED TYPE 2 DIABETES MELLITUS WITHOUT COMPLICATION, WITHOUT LONG-TERM CURRENT USE OF INSULIN (HCC): Primary | ICD-10-CM

## 2021-02-19 LAB
ABSOLUTE BASOPHILS: 36 CELLS/UL (ref 0–200)
ABSOLUTE EOSINOPHILS: 27 CELLS/UL (ref 15–500)
ABSOLUTE LYMPHOCYTES: 1338 CELLS/UL (ref 850–3900)
ABSOLUTE MONOCYTES: 546 CELLS/UL (ref 200–950)
ABSOLUTE NEUTROPHILS: 7153 CELLS/UL (ref 1500–7800)
ALBUMIN/GLOBULIN RATIO: 1.5 (CALC) (ref 1–2.5)
ALBUMIN: 3.8 G/DL (ref 3.6–5.1)
ALKALINE PHOSPHATASE: 68 U/L (ref 35–144)
ALT: 45 U/L (ref 9–46)
AST: 22 U/L (ref 10–35)
BASOPHILS: 0.4 %
BILIRUBIN, TOTAL: 0.5 MG/DL (ref 0.2–1.2)
BUN: 23 MG/DL (ref 7–25)
CALCIUM: 9.9 MG/DL (ref 8.6–10.3)
CARBON DIOXIDE: 27 MMOL/L (ref 20–32)
CHLORIDE: 101 MMOL/L (ref 98–110)
CHOL/HDLC RATIO: 2.9 (CALC)
CHOLESTEROL, TOTAL: 158 MG/DL
CREATININE: 1.08 MG/DL (ref 0.7–1.18)
EGFR IF AFRICN AM: 77 ML/MIN/1.73M2
EGFR IF NONAFRICN AM: 67 ML/MIN/1.73M2
EOSINOPHILS: 0.3 %
GLOBULIN: 2.5 G/DL (CALC) (ref 1.9–3.7)
GLUCOSE: 183 MG/DL (ref 65–99)
HDL CHOLESTEROL: 54 MG/DL
HEMATOCRIT: 39.9 % (ref 38.5–50)
HEMOGLOBIN A1C: 6.9 % OF TOTAL HGB
HEMOGLOBIN: 13.3 G/DL (ref 13.2–17.1)
LDL-CHOLESTEROL: 79 MG/DL (CALC)
LYMPHOCYTES: 14.7 %
MCH: 28.7 PG (ref 27–33)
MCHC: 33.3 G/DL (ref 32–36)
MCV: 86.2 FL (ref 80–100)
MONOCYTES: 6 %
MPV: 8.8 FL (ref 7.5–12.5)
NEUTROPHILS: 78.6 %
NON-HDL CHOLESTEROL: 104 MG/DL (CALC)
PLATELET COUNT: 346 THOUSAND/UL (ref 140–400)
POTASSIUM: 3.8 MMOL/L (ref 3.5–5.3)
PROTEIN, TOTAL: 6.3 G/DL (ref 6.1–8.1)
PSA, TOTAL: <0.1 NG/ML
RDW: 12.8 % (ref 11–15)
RED BLOOD CELL COUNT: 4.63 MILLION/UL (ref 4.2–5.8)
SODIUM: 137 MMOL/L (ref 135–146)
TRIGLYCERIDES: 152 MG/DL
TSH W/REFLEX TO FT4: 2.94 MIU/L (ref 0.4–4.5)
WHITE BLOOD CELL COUNT: 9.1 THOUSAND/UL (ref 3.8–10.8)

## 2021-02-19 NOTE — TELEPHONE ENCOUNTER
Pt's daughter Wing Beryl) stated that pt has now been 3wks smoke free and is hoping to schedule LT KNEE SX w Mariza Myers in about 4wks in hopes pt will continue to be smoke free    Pt can be reached @ [phone]

## 2021-02-19 NOTE — TELEPHONE ENCOUNTER
----- Message from Bettina Albarran DO sent at 2/19/2021 12:05 PM CST -----  Can notify Maribeth Anders his Cholesterol looks great, his thyroid blood count and Prostate test looked very good as well, his BS control went up a bit from 6.6 to 6.9, but that's still bet

## 2021-02-19 NOTE — TELEPHONE ENCOUNTER
They can come in to clinic to discuss scheduling knee replacement, but based on notes from his recent visit with Dr. Boyd Grey, we will also need to make sure he follows through with urology evaluation and ensure no bladder infection/UTI prior to scheduling kn

## 2021-02-19 NOTE — TELEPHONE ENCOUNTER
FYI  Patient's daughter informed on the bladder infection instructions  okay per Hippa. scheduled patient for Saturday 3.6.21 for surgical discussion.

## 2021-02-20 ENCOUNTER — TELEPHONE (OUTPATIENT)
Dept: FAMILY MEDICINE CLINIC | Facility: CLINIC | Age: 76
End: 2021-02-20

## 2021-02-20 ENCOUNTER — PATIENT MESSAGE (OUTPATIENT)
Dept: FAMILY MEDICINE CLINIC | Facility: CLINIC | Age: 76
End: 2021-02-20

## 2021-02-20 NOTE — TELEPHONE ENCOUNTER
From: Vega Puga  To: Brittany Baez, DO  Sent: 2/20/2021 9:19 AM CST  Subject: Non-Urgent Medical Question    This message is being sent by Lexie Zapata on behalf of Vega Puga. Dr. Catherne Angelucci,  Dad has these painful bumps on his tongue.  I d

## 2021-02-20 NOTE — TELEPHONE ENCOUNTER
Per Patient daughter- medication is out of stock at [facility]    RN spoke with Larry Sellers at [facility] in [facility]- they have in [address]   $56/4oz bottle    Daughter asked that we please send

## 2021-02-20 NOTE — TELEPHONE ENCOUNTER
Magic Mouthwash sent to pharmacy was not covered- can we please try other Magic Mouthwash?   Pended for in office provider to sign

## 2021-02-23 ENCOUNTER — TELEPHONE (OUTPATIENT)
Dept: FAMILY MEDICINE CLINIC | Facility: CLINIC | Age: 76
End: 2021-02-23

## 2021-02-24 ENCOUNTER — MED REC SCAN ONLY (OUTPATIENT)
Dept: FAMILY MEDICINE CLINIC | Facility: CLINIC | Age: 76
End: 2021-02-24

## 2021-02-25 ENCOUNTER — IMMUNIZATION (OUTPATIENT)
Dept: LAB | Age: 76
End: 2021-02-25
Attending: HOSPITALIST
Payer: MEDICARE

## 2021-02-25 DIAGNOSIS — Z23 NEED FOR VACCINATION: Primary | ICD-10-CM

## 2021-02-25 PROCEDURE — 0002A SARSCOV2 VAC 30MCG/0.3ML IM: CPT

## 2021-03-06 ENCOUNTER — OFFICE VISIT (OUTPATIENT)
Dept: ORTHOPEDICS CLINIC | Facility: CLINIC | Age: 76
End: 2021-03-06
Payer: MEDICARE

## 2021-03-06 VITALS — HEIGHT: 70 IN | BODY MASS INDEX: 32.04 KG/M2 | HEART RATE: 91 BPM | OXYGEN SATURATION: 98 % | WEIGHT: 223.81 LBS

## 2021-03-06 DIAGNOSIS — M17.12 ARTHRITIS OF LEFT KNEE: Primary | ICD-10-CM

## 2021-03-06 PROCEDURE — 3008F BODY MASS INDEX DOCD: CPT | Performed by: ORTHOPAEDIC SURGERY

## 2021-03-06 PROCEDURE — 99213 OFFICE O/P EST LOW 20 MIN: CPT | Performed by: ORTHOPAEDIC SURGERY

## 2021-03-06 NOTE — PROGRESS NOTES
[facility] Progress Note    Subjective: Patient returns for discussion of his left knee. He states that he has not smoked any cigarettes in almost a month now.   He was recently evaluated and treated for a UTI with Pseudomonas, fever and altered ment understanding with the discussion today and they will contact us after urology evaluation and optimization is completed.     Johanna Gold MD, [address] Orthopedic Surgery  Phone [phone]  Fax [phone]

## 2021-03-09 ENCOUNTER — TELEPHONE (OUTPATIENT)
Dept: FAMILY MEDICINE CLINIC | Facility: CLINIC | Age: 76
End: 2021-03-09

## 2021-03-09 RX ORDER — LANCETS 33 GAUGE
1 EACH MISCELLANEOUS 2 TIMES DAILY
Qty: 100 EACH | Refills: 3 | Status: SHIPPED | OUTPATIENT
Start: 2021-03-09 | End: 2021-09-24

## 2021-03-09 RX ORDER — BLOOD SUGAR DIAGNOSTIC
STRIP MISCELLANEOUS
Qty: 100 STRIP | Refills: 1 | Status: SHIPPED | OUTPATIENT
Start: 2021-03-09 | End: 2021-06-01

## 2021-03-09 RX ORDER — BLOOD-GLUCOSE METER
1 EACH MISCELLANEOUS 2 TIMES DAILY
Qty: 1 KIT | Refills: 0 | Status: SHIPPED | OUTPATIENT
Start: 2021-03-09 | End: 2021-04-05

## 2021-03-09 NOTE — TELEPHONE ENCOUNTER
DAUGHTER VERBALIZED THAT PT NEEDS A     ONE TOUCH GLUCOSE MONITOR AND SUPPLIES.     PLEASE SEND TO [facility]         PLEASE SEND TO PSA RESULTS TO DR Pedrito Cavanaugh    ANY QUESTIONS PLEASE TALK TO PTS DAUGHTER     THANK YOU

## 2021-03-10 ENCOUNTER — MED REC SCAN ONLY (OUTPATIENT)
Dept: FAMILY MEDICINE CLINIC | Facility: CLINIC | Age: 76
End: 2021-03-10

## 2021-03-13 ENCOUNTER — TELEPHONE (OUTPATIENT)
Dept: FAMILY MEDICINE CLINIC | Facility: CLINIC | Age: 76
End: 2021-03-13

## 2021-03-13 RX ORDER — LEVOFLOXACIN 500 MG/1
500 TABLET, FILM COATED ORAL DAILY
Qty: 20 TABLET | Refills: 0 | Status: SHIPPED | OUTPATIENT
Start: 2021-03-13 | End: 2021-03-13

## 2021-03-13 RX ORDER — LEVOFLOXACIN 500 MG/1
500 TABLET, FILM COATED ORAL DAILY
Qty: 10 TABLET | Refills: 0 | Status: SHIPPED | OUTPATIENT
Start: 2021-03-13 | End: 2021-04-01

## 2021-03-13 NOTE — TELEPHONE ENCOUNTER
Shanice Jansen from [facility] called, needs to clarify the script for levofloxacin 500 MG Oral Tab.    Please call Shanice Jansen at [phone]

## 2021-03-15 ENCOUNTER — TELEPHONE (OUTPATIENT)
Dept: FAMILY MEDICINE CLINIC | Facility: CLINIC | Age: 76
End: 2021-03-15

## 2021-03-15 NOTE — TELEPHONE ENCOUNTER
Overdue result letter mailed to patient   Lab Frequency Next Occurrence   COMP METABOLIC PANEL (14) Once 89/28/0865   LIPID PANEL Once 03/04/2021   HEMOGLOBIN A1C Once 03/04/2021   PSA SCREEN Once 02/11/2021   LIPID PANEL Once 02/11/2021   TSH W REFLEX TO

## 2021-03-20 ENCOUNTER — HOSPITAL ENCOUNTER (OUTPATIENT)
Dept: CT IMAGING | Age: 76
Discharge: HOME | End: 2021-03-20
Attending: UROLOGY
Payer: MEDICARE

## 2021-03-20 DIAGNOSIS — R39.9 LOWER URINARY TRACT SYMPTOMS (LUTS): ICD-10-CM

## 2021-03-20 DIAGNOSIS — Z12.5 PROSTATE CANCER SCREENING: ICD-10-CM

## 2021-03-20 DIAGNOSIS — N40.0 HYPERTROPHY OF PROSTATE WITHOUT URINARY OBSTRUCTION: ICD-10-CM

## 2021-03-20 DIAGNOSIS — N39.0 RECURRENT UTI: ICD-10-CM

## 2021-03-20 PROCEDURE — 74178 CT ABD&PLV WO CNTR FLWD CNTR: CPT | Performed by: UROLOGY

## 2021-03-20 PROCEDURE — 76377 3D RENDER W/INTRP POSTPROCES: CPT | Performed by: UROLOGY

## 2021-04-01 ENCOUNTER — OFFICE VISIT (OUTPATIENT)
Dept: FAMILY MEDICINE CLINIC | Facility: CLINIC | Age: 76
End: 2021-04-01
Payer: MEDICARE

## 2021-04-01 VITALS
HEART RATE: 80 BPM | TEMPERATURE: 98 F | WEIGHT: 225.63 LBS | RESPIRATION RATE: 24 BRPM | BODY MASS INDEX: 32 KG/M2 | SYSTOLIC BLOOD PRESSURE: 140 MMHG | DIASTOLIC BLOOD PRESSURE: 80 MMHG

## 2021-04-01 DIAGNOSIS — G56.03 CARPAL TUNNEL SYNDROME, BILATERAL: Primary | ICD-10-CM

## 2021-04-01 DIAGNOSIS — M25.50 ARTHRALGIA, UNSPECIFIED JOINT: ICD-10-CM

## 2021-04-01 DIAGNOSIS — F32.89 OTHER DEPRESSION: ICD-10-CM

## 2021-04-01 DIAGNOSIS — G20 PARKINSON'S DISEASE (HCC): ICD-10-CM

## 2021-04-01 PROBLEM — G20.A1 PARKINSON'S DISEASE: Status: ACTIVE | Noted: 2021-04-01

## 2021-04-01 PROBLEM — G20.A1 PARKINSON'S DISEASE (HCC): Status: ACTIVE | Noted: 2021-04-01

## 2021-04-01 PROCEDURE — 99214 OFFICE O/P EST MOD 30 MIN: CPT | Performed by: FAMILY MEDICINE

## 2021-04-01 PROCEDURE — 3079F DIAST BP 80-89 MM HG: CPT | Performed by: FAMILY MEDICINE

## 2021-04-01 PROCEDURE — 3077F SYST BP >= 140 MM HG: CPT | Performed by: FAMILY MEDICINE

## 2021-04-01 RX ORDER — GLIPIZIDE 5 MG/1
10 TABLET ORAL
Qty: 60 TABLET | Refills: 5 | Status: SHIPPED | OUTPATIENT
Start: 2021-04-01 | End: 2021-06-16

## 2021-04-01 RX ORDER — CITALOPRAM 10 MG/1
10 TABLET ORAL DAILY
Qty: 30 TABLET | Refills: 1 | Status: SHIPPED | OUTPATIENT
Start: 2021-04-01 | End: 2021-04-29 | Stop reason: DRUGHIGH

## 2021-04-01 NOTE — PROGRESS NOTES
Eric Hussein is a 76year old male. HPI:   Mat is here for discussion fo his depression issues, his pain issues and also med refills. He has a complicated and extended medical Hx  And has had a recent life change , moving here from [address] [place] [address]. Budesonide-Formoterol Fumarate (SYMBICORT) 160-4.5 MCG/ACT Inhalation Aerosol Inhale 2 puffs into the lungs 2 (two) times daily. 1 Inhaler 1   • Omega-3 1000 MG Oral Cap Take 1 capsule by mouth daily.        • metFORMIN HCl 1000 MG Oral Tab Take 500 mg by m encounter diagnosis)  Parkinson's disease (hcc)  Other depression  Arthralgia, unspecified joint    Discussed medication side effects and expected course      Meds & Refills for this Visit:  Requested Prescriptions     Signed Prescriptions Disp Refills   •

## 2021-04-01 NOTE — TELEPHONE ENCOUNTER
Pt needs RF of   Glipizide 5mg    LOV: Today  Last Refill: historical    Our records show 2  5mg tabs daily ( total of 10mg)- please review with pt and see if that is how he is still taking it

## 2021-04-05 ENCOUNTER — TELEPHONE (OUTPATIENT)
Dept: FAMILY MEDICINE CLINIC | Facility: CLINIC | Age: 76
End: 2021-04-05

## 2021-04-05 DIAGNOSIS — N18.30 CKD STAGE 3 DUE TO TYPE 2 DIABETES MELLITUS (HCC): ICD-10-CM

## 2021-04-05 DIAGNOSIS — E11.22 CKD STAGE 3 DUE TO TYPE 2 DIABETES MELLITUS (HCC): ICD-10-CM

## 2021-04-05 DIAGNOSIS — E11.9 CONTROLLED TYPE 2 DIABETES MELLITUS WITHOUT COMPLICATION, WITHOUT LONG-TERM CURRENT USE OF INSULIN (HCC): Primary | ICD-10-CM

## 2021-04-05 DIAGNOSIS — R60.0 EDEMA OF BOTH LOWER EXTREMITIES: ICD-10-CM

## 2021-04-05 RX ORDER — BLOOD-GLUCOSE METER
1 KIT MISCELLANEOUS 2 TIMES DAILY
Qty: 1 KIT | Refills: 0 | Status: SHIPPED | OUTPATIENT
Start: 2021-04-05

## 2021-04-05 NOTE — TELEPHONE ENCOUNTER
Diabetic Supplies Protocol Hergmt4304/05/2021 09:03 AM   Appointment in the past 12 or next 3 months     Refilled per protocol.

## 2021-04-05 NOTE — TELEPHONE ENCOUNTER
Overdue result letter mailed to patient   Lab Frequency Next Occurrence   COMP METABOLIC PANEL (14) Once 38/67/7834   LIPID PANEL Once 03/04/2021   MICROALB/CREAT RATIO, RANDOM URINE Once 03/04/2021   HEMOGLOBIN A1C Once 03/04/2021

## 2021-04-12 ENCOUNTER — TELEPHONE (OUTPATIENT)
Dept: FAMILY MEDICINE CLINIC | Facility: CLINIC | Age: 76
End: 2021-04-12

## 2021-04-12 NOTE — TELEPHONE ENCOUNTER
Gennaro Garcia requested results for last PSA and urine cuture for upcoming URO visit. These have been printed and given to her.

## 2021-04-14 ENCOUNTER — OFFICE VISIT (OUTPATIENT)
Dept: FAMILY MEDICINE CLINIC | Facility: CLINIC | Age: 76
End: 2021-04-14
Payer: MEDICARE

## 2021-04-14 ENCOUNTER — HOSPITAL ENCOUNTER (OUTPATIENT)
Dept: GENERAL RADIOLOGY | Age: 76
Discharge: HOME | End: 2021-04-14
Attending: FAMILY MEDICINE
Payer: MEDICARE

## 2021-04-14 ENCOUNTER — HOSPITAL ENCOUNTER (OUTPATIENT)
Dept: CV DIAGNOSTICS | Age: 76
Discharge: HOME | End: 2021-04-14
Attending: FAMILY MEDICINE
Payer: MEDICARE

## 2021-04-14 VITALS
RESPIRATION RATE: 22 BRPM | HEART RATE: 64 BPM | BODY MASS INDEX: 32 KG/M2 | SYSTOLIC BLOOD PRESSURE: 142 MMHG | WEIGHT: 228.38 LBS | TEMPERATURE: 97 F | DIASTOLIC BLOOD PRESSURE: 76 MMHG

## 2021-04-14 DIAGNOSIS — E11.9 CONTROLLED TYPE 2 DIABETES MELLITUS WITHOUT COMPLICATION, WITHOUT LONG-TERM CURRENT USE OF INSULIN (HCC): ICD-10-CM

## 2021-04-14 DIAGNOSIS — Z01.818 PREOP EXAMINATION: Primary | ICD-10-CM

## 2021-04-14 DIAGNOSIS — E11.22 CKD STAGE 3 DUE TO TYPE 2 DIABETES MELLITUS (HCC): ICD-10-CM

## 2021-04-14 DIAGNOSIS — I44.7 LEFT BUNDLE BRANCH BLOCK (LBBB): ICD-10-CM

## 2021-04-14 DIAGNOSIS — Z01.818 PREOP EXAMINATION: ICD-10-CM

## 2021-04-14 DIAGNOSIS — R94.31 NONSPECIFIC ST-T CHANGES: ICD-10-CM

## 2021-04-14 DIAGNOSIS — R60.0 EDEMA OF BOTH LOWER EXTREMITIES: ICD-10-CM

## 2021-04-14 DIAGNOSIS — G56.02 CARPAL TUNNEL SYNDROME ON LEFT: ICD-10-CM

## 2021-04-14 DIAGNOSIS — M65.332 TRIGGER FINGER, LEFT MIDDLE FINGER: ICD-10-CM

## 2021-04-14 DIAGNOSIS — N18.30 CKD STAGE 3 DUE TO TYPE 2 DIABETES MELLITUS (HCC): ICD-10-CM

## 2021-04-14 PROCEDURE — 93306 TTE W/DOPPLER COMPLETE: CPT | Performed by: FAMILY MEDICINE

## 2021-04-14 PROCEDURE — 93000 ELECTROCARDIOGRAM COMPLETE: CPT | Performed by: FAMILY MEDICINE

## 2021-04-14 PROCEDURE — 3078F DIAST BP <80 MM HG: CPT | Performed by: FAMILY MEDICINE

## 2021-04-14 PROCEDURE — 99214 OFFICE O/P EST MOD 30 MIN: CPT | Performed by: FAMILY MEDICINE

## 2021-04-14 PROCEDURE — 80053 COMPREHEN METABOLIC PANEL: CPT | Performed by: FAMILY MEDICINE

## 2021-04-14 PROCEDURE — 3077F SYST BP >= 140 MM HG: CPT | Performed by: FAMILY MEDICINE

## 2021-04-14 PROCEDURE — 85025 COMPLETE CBC W/AUTO DIFF WBC: CPT | Performed by: FAMILY MEDICINE

## 2021-04-14 PROCEDURE — 71046 X-RAY EXAM CHEST 2 VIEWS: CPT | Performed by: FAMILY MEDICINE

## 2021-04-14 NOTE — PROGRESS NOTES
Liz Grace is a 76year old male who presents for a pre-operative physical exam. Patient is to have A LEFT CARPAL TUNNEL RELEASE WITH POSSIBLE ANTERIOR TRANSPOSITION, LEFT REVISION EXTENDED OPEN CARPAL TUNNEL RELEASE AND LEFT MIDDLE FINGER TRIGGER R 100 MCG Oral Tab Take 100 mcg by mouth daily. • FOLIC ACID OR Take 1 mg by mouth. • Budesonide-Formoterol Fumarate (SYMBICORT) 160-4.5 MCG/ACT Inhalation Aerosol Inhale 2 puffs into the lungs 2 (two) times daily.  1 Inhaler 1   • Omega-3 1000 MG O Position: Sitting, Cuff Size: large)   Pulse 64   Temp 97.2 °F (36.2 °C) (Temporal)   Resp 22   Wt 228 lb 6.4 oz (103.6 kg)   BMI 31.86 kg/m²   GENERAL: well developed, well nourished,in no apparent distress  SKIN: no rashes,no suspicious lesions  HEENT: a CARPAL TUNNEL RELEASE AND LEFT MIDDLE FINGER TRIGGER RELEASE , to be done by Dr. Hector Membreno at [facility] on 4/30/21. Gaurav Vincent Pt is a good surgical candidate.  This consult was sent back the referring physician, Dr. Hector Membreno

## 2021-04-21 ENCOUNTER — HOSPITAL ENCOUNTER (OUTPATIENT)
Dept: CV DIAGNOSTICS | Age: 76
Discharge: HOME | End: 2021-04-21
Attending: FAMILY MEDICINE
Payer: MEDICARE

## 2021-04-21 DIAGNOSIS — R94.31 NONSPECIFIC ST-T CHANGES: ICD-10-CM

## 2021-04-21 DIAGNOSIS — I44.7 LEFT BUNDLE BRANCH BLOCK (LBBB): ICD-10-CM

## 2021-04-21 DIAGNOSIS — E11.9 CONTROLLED TYPE 2 DIABETES MELLITUS WITHOUT COMPLICATION, WITHOUT LONG-TERM CURRENT USE OF INSULIN (HCC): ICD-10-CM

## 2021-04-21 PROCEDURE — 93018 CV STRESS TEST I&R ONLY: CPT | Performed by: FAMILY MEDICINE

## 2021-04-21 PROCEDURE — 78452 HT MUSCLE IMAGE SPECT MULT: CPT | Performed by: FAMILY MEDICINE

## 2021-04-21 PROCEDURE — 93017 CV STRESS TEST TRACING ONLY: CPT | Performed by: FAMILY MEDICINE

## 2021-04-24 RX ORDER — CITALOPRAM 10 MG/1
TABLET ORAL
Qty: 30 TABLET | Refills: 1 | OUTPATIENT
Start: 2021-04-24

## 2021-04-26 DIAGNOSIS — R94.31 NONSPECIFIC ST-T CHANGES: ICD-10-CM

## 2021-04-26 DIAGNOSIS — R94.39 ABNORMAL STRESS TEST: Primary | ICD-10-CM

## 2021-04-28 ENCOUNTER — TELEPHONE (OUTPATIENT)
Dept: CARDIOLOGY | Age: 76
End: 2021-04-28

## 2021-04-29 RX ORDER — CITALOPRAM 20 MG/1
20 TABLET ORAL DAILY
Qty: 90 TABLET | Refills: 2 | Status: SHIPPED | OUTPATIENT
Start: 2021-04-29 | End: 2021-07-28

## 2021-05-04 ENCOUNTER — TELEPHONE (OUTPATIENT)
Dept: CARDIOLOGY | Age: 76
End: 2021-05-04

## 2021-05-05 RX ORDER — BUDESONIDE AND FORMOTEROL FUMARATE DIHYDRATE 160; 4.5 UG/1; UG/1
AEROSOL RESPIRATORY (INHALATION)
Qty: 10.2 INHALER | Refills: 1 | OUTPATIENT
Start: 2021-05-05

## 2021-05-05 NOTE — TELEPHONE ENCOUNTER
Asthma & COPD Medication Protocol Dxvstl3805/05/2021 12:45 AM   Asthma Action Score greater than or equal to 20    AAP/ACT given in last 12 months    Appointment in past 6 or next 3 months      Per Daughter pt does not need refill at this time- refill refuse

## 2021-05-06 ENCOUNTER — TELEPHONE (OUTPATIENT)
Dept: FAMILY MEDICINE CLINIC | Facility: CLINIC | Age: 76
End: 2021-05-06

## 2021-05-06 NOTE — TELEPHONE ENCOUNTER
Daughter called and requested print out of pt stress test    Daughter is on release of records- okay per HIPPA    Stress test results printed and provided to [facility]

## 2021-05-13 ENCOUNTER — OFFICE VISIT (OUTPATIENT)
Dept: CARDIOLOGY | Age: 76
End: 2021-05-13

## 2021-05-13 VITALS
HEIGHT: 71 IN | BODY MASS INDEX: 30.94 KG/M2 | HEART RATE: 70 BPM | DIASTOLIC BLOOD PRESSURE: 68 MMHG | WEIGHT: 221 LBS | SYSTOLIC BLOOD PRESSURE: 110 MMHG

## 2021-05-13 DIAGNOSIS — J43.2 CENTRILOBULAR EMPHYSEMA (CMD): ICD-10-CM

## 2021-05-13 DIAGNOSIS — R06.09 DOE (DYSPNEA ON EXERTION): Primary | ICD-10-CM

## 2021-05-13 DIAGNOSIS — R94.31 ABNORMAL ELECTROCARDIOGRAM (ECG) (EKG): ICD-10-CM

## 2021-05-13 DIAGNOSIS — Z82.49 FAMILY HISTORY OF CARDIOVASCULAR DISEASE: ICD-10-CM

## 2021-05-13 DIAGNOSIS — R94.39 ABNORMAL NUCLEAR STRESS TEST: ICD-10-CM

## 2021-05-13 PROCEDURE — 93000 ELECTROCARDIOGRAM COMPLETE: CPT | Performed by: INTERNAL MEDICINE

## 2021-05-13 PROCEDURE — 99205 OFFICE O/P NEW HI 60 MIN: CPT | Performed by: INTERNAL MEDICINE

## 2021-05-13 RX ORDER — CITALOPRAM 20 MG/1
20 TABLET ORAL DAILY
COMMUNITY
Start: 2021-04-29

## 2021-05-13 RX ORDER — FINASTERIDE 5 MG/1
5 TABLET, FILM COATED ORAL DAILY
COMMUNITY
Start: 2021-02-16

## 2021-05-13 RX ORDER — FOLIC ACID 1 MG/1
1 TABLET ORAL DAILY
COMMUNITY

## 2021-05-13 RX ORDER — DIPHENHYDRAMINE HYDROCHLORIDE AND LIDOCAINE HYDROCHLORIDE AND ALUMINUM HYDROXIDE AND MAGNESIUM HYDRO
KIT
COMMUNITY
Start: 2021-02-20 | End: 2021-05-13 | Stop reason: ALTCHOICE

## 2021-05-13 RX ORDER — ROSUVASTATIN CALCIUM 20 MG/1
10 TABLET, COATED ORAL
COMMUNITY
End: 2021-05-13 | Stop reason: ALTCHOICE

## 2021-05-13 RX ORDER — GLIPIZIDE 5 MG/1
TABLET ORAL
COMMUNITY
Start: 2021-04-01

## 2021-05-13 RX ORDER — BUDESONIDE AND FORMOTEROL FUMARATE DIHYDRATE 160; 4.5 UG/1; UG/1
AEROSOL RESPIRATORY (INHALATION)
COMMUNITY
Start: 2021-03-31

## 2021-05-13 RX ORDER — ATORVASTATIN CALCIUM 40 MG/1
TABLET, FILM COATED ORAL
COMMUNITY
Start: 2021-01-27

## 2021-05-13 RX ORDER — CHLORAL HYDRATE 500 MG
1 CAPSULE ORAL
COMMUNITY

## 2021-05-13 RX ORDER — TAMSULOSIN HYDROCHLORIDE 0.4 MG/1
0.4 CAPSULE ORAL
COMMUNITY

## 2021-05-13 RX ORDER — CLONAZEPAM 0.5 MG/1
0.5 TABLET ORAL NIGHTLY
COMMUNITY
Start: 2021-02-18

## 2021-05-13 RX ORDER — PHENOL 1.4 %
2 AEROSOL, SPRAY (ML) MUCOUS MEMBRANE
COMMUNITY

## 2021-05-13 RX ORDER — ROPINIROLE 5 MG/1
5 TABLET, FILM COATED ORAL 3 TIMES DAILY
COMMUNITY

## 2021-05-13 RX ORDER — PREDNISONE 20 MG/1
60 TABLET ORAL DAILY
COMMUNITY
Start: 2021-02-15 | End: 2021-05-13 | Stop reason: CLARIF

## 2021-05-13 RX ORDER — SITAGLIPTIN 50 MG/1
50 TABLET, FILM COATED ORAL DAILY
COMMUNITY
Start: 2021-05-11

## 2021-05-13 ASSESSMENT — PATIENT HEALTH QUESTIONNAIRE - PHQ9
2. FEELING DOWN, DEPRESSED OR HOPELESS: NOT AT ALL
CLINICAL INTERPRETATION OF PHQ9 SCORE: NO FURTHER SCREENING NEEDED
CLINICAL INTERPRETATION OF PHQ2 SCORE: NO FURTHER SCREENING NEEDED
SUM OF ALL RESPONSES TO PHQ9 QUESTIONS 1 AND 2: 0
SUM OF ALL RESPONSES TO PHQ9 QUESTIONS 1 AND 2: 0
1. LITTLE INTEREST OR PLEASURE IN DOING THINGS: NOT AT ALL

## 2021-05-14 ENCOUNTER — TELEPHONE (OUTPATIENT)
Dept: CARDIOLOGY | Age: 76
End: 2021-05-14

## 2021-05-14 DIAGNOSIS — Z82.49 FAMILY HISTORY OF CARDIOVASCULAR DISEASE: ICD-10-CM

## 2021-05-14 DIAGNOSIS — R94.39 ABNORMAL NUCLEAR STRESS TEST: ICD-10-CM

## 2021-05-14 DIAGNOSIS — R06.09 DOE (DYSPNEA ON EXERTION): Primary | ICD-10-CM

## 2021-05-14 DIAGNOSIS — R94.31 ABNORMAL ELECTROCARDIOGRAM (ECG) (EKG): ICD-10-CM

## 2021-05-17 ENCOUNTER — TELEPHONE (OUTPATIENT)
Dept: FAMILY MEDICINE CLINIC | Facility: CLINIC | Age: 76
End: 2021-05-17

## 2021-05-17 NOTE — TELEPHONE ENCOUNTER
Daughter called and requested that Xray from 10/2020 be sent to Dr. Siobhan Jeong    Fax [phone]    Xray printed and faxed to number provided

## 2021-05-19 ENCOUNTER — MED REC SCAN ONLY (OUTPATIENT)
Dept: FAMILY MEDICINE CLINIC | Facility: CLINIC | Age: 76
End: 2021-05-19

## 2021-06-01 RX ORDER — BLOOD SUGAR DIAGNOSTIC
STRIP MISCELLANEOUS
Qty: 200 STRIP | Refills: 0 | Status: SHIPPED | OUTPATIENT
Start: 2021-06-01 | End: 2021-08-24

## 2021-06-09 ENCOUNTER — TELEPHONE (OUTPATIENT)
Dept: FAMILY MEDICINE CLINIC | Facility: CLINIC | Age: 76
End: 2021-06-09

## 2021-06-09 ENCOUNTER — MED REC SCAN ONLY (OUTPATIENT)
Dept: FAMILY MEDICINE CLINIC | Facility: CLINIC | Age: 76
End: 2021-06-09

## 2021-06-16 ENCOUNTER — LAB ENCOUNTER (OUTPATIENT)
Dept: LAB | Age: 76
End: 2021-06-16
Attending: INTERNAL MEDICINE
Payer: MEDICARE

## 2021-06-16 DIAGNOSIS — R94.39 ABNORMAL NUCLEAR STRESS TEST: ICD-10-CM

## 2021-06-16 LAB — SARS-COV-2 RNA SPEC QL NAA+PROBE: NOT DETECTED

## 2021-06-16 RX ORDER — GLIPIZIDE 10 MG/1
10 TABLET ORAL DAILY
COMMUNITY
End: 2021-07-12

## 2021-06-16 RX ORDER — MAG HYDROX/ALUMINUM HYD/SIMETH 400-400-40
5000 SUSPENSION, ORAL (FINAL DOSE FORM) ORAL
COMMUNITY

## 2021-06-16 RX ORDER — MAG HYDROX/ALUMINUM HYD/SIMETH 400-400-40
450 SUSPENSION, ORAL (FINAL DOSE FORM) ORAL DAILY
COMMUNITY

## 2021-06-16 NOTE — HISTORICAL OFFICE NOTE
Progress Notes  - documented in this encounter  Holden Asher MD - 05/13/2021 5:56 PM CDT  Formatting of this note is different from the original.  5/13/2021    [address] [place] [address] Specialists/[facility]  Clinic Note deliberately. He also has been diagnosed with Parkinson's disease. This presented as a tremor. The tremor has been well controlled on medical therapy    His electrocardiogram revealed normal sinus rhythm with a left anterior fascicular block.     He is o TAKE 2 TABLETS (10 MG TOTAL) BY MOUTH EVERY MORNING BEFORE BREAKFAST. • Melatonin 10 MG Tab Take 2 tablets by mouth. • metFORMIN (GLUCOPHAGE) 500 MG tablet Take 500 mg by mouth daily (with breakfast).    • Omega-3 Fatty Acids (omega-3 fish oil) 1000 MG they have a good understanding. We will probably set this up within the next 2 to 3 weeks.  He would like to hold off a bit in the short-term as his son-in-law is having a neurosurgical procedure at [facility].    Further recommendations will b

## 2021-06-18 ENCOUNTER — HOSPITAL ENCOUNTER (OUTPATIENT)
Dept: INTERVENTIONAL RADIOLOGY/VASCULAR | Facility: HOSPITAL | Age: 76
Discharge: HOME | End: 2021-06-18
Attending: INTERNAL MEDICINE | Admitting: INTERNAL MEDICINE
Payer: MEDICARE

## 2021-06-18 VITALS
BODY MASS INDEX: 31.5 KG/M2 | TEMPERATURE: 97 F | HEIGHT: 71 IN | RESPIRATION RATE: 19 BRPM | HEART RATE: 68 BPM | SYSTOLIC BLOOD PRESSURE: 124 MMHG | DIASTOLIC BLOOD PRESSURE: 70 MMHG | OXYGEN SATURATION: 94 % | WEIGHT: 225 LBS

## 2021-06-18 DIAGNOSIS — R06.00 DYSPNEA: ICD-10-CM

## 2021-06-18 DIAGNOSIS — R94.39 ABNORMAL NUCLEAR STRESS TEST: Primary | ICD-10-CM

## 2021-06-18 PROCEDURE — 93458 L HRT ARTERY/VENTRICLE ANGIO: CPT

## 2021-06-18 PROCEDURE — 4A023N7 MEASUREMENT OF CARDIAC SAMPLING AND PRESSURE, LEFT HEART, PERCUTANEOUS APPROACH: ICD-10-PCS | Performed by: INTERNAL MEDICINE

## 2021-06-18 PROCEDURE — B2111ZZ FLUOROSCOPY OF MULTIPLE CORONARY ARTERIES USING LOW OSMOLAR CONTRAST: ICD-10-PCS | Performed by: INTERNAL MEDICINE

## 2021-06-18 PROCEDURE — 82962 GLUCOSE BLOOD TEST: CPT

## 2021-06-18 PROCEDURE — 99153 MOD SED SAME PHYS/QHP EA: CPT

## 2021-06-18 PROCEDURE — 99152 MOD SED SAME PHYS/QHP 5/>YRS: CPT

## 2021-06-18 PROCEDURE — 93458 L HRT ARTERY/VENTRICLE ANGIO: CPT | Performed by: INTERNAL MEDICINE

## 2021-06-18 PROCEDURE — 99152 MOD SED SAME PHYS/QHP 5/>YRS: CPT | Performed by: INTERNAL MEDICINE

## 2021-06-18 PROCEDURE — B2151ZZ FLUOROSCOPY OF LEFT HEART USING LOW OSMOLAR CONTRAST: ICD-10-PCS | Performed by: INTERNAL MEDICINE

## 2021-06-18 RX ORDER — ASPIRIN 81 MG/1
81 TABLET ORAL DAILY
Qty: 30 TABLET | Refills: 5 | Status: SHIPPED | OUTPATIENT
Start: 2021-06-18

## 2021-06-18 RX ORDER — VERAPAMIL HYDROCHLORIDE 2.5 MG/ML
INJECTION, SOLUTION INTRAVENOUS
Status: COMPLETED
Start: 2021-06-18 | End: 2021-06-18

## 2021-06-18 RX ORDER — HEPARIN SODIUM 5000 [USP'U]/ML
INJECTION, SOLUTION INTRAVENOUS; SUBCUTANEOUS
Status: COMPLETED
Start: 2021-06-18 | End: 2021-06-18

## 2021-06-18 RX ORDER — MIDAZOLAM HYDROCHLORIDE 1 MG/ML
INJECTION INTRAMUSCULAR; INTRAVENOUS
Status: COMPLETED
Start: 2021-06-18 | End: 2021-06-18

## 2021-06-18 RX ORDER — NITROGLYCERIN 20 MG/100ML
INJECTION INTRAVENOUS
Status: COMPLETED
Start: 2021-06-18 | End: 2021-06-18

## 2021-06-18 RX ORDER — ASPIRIN 81 MG/1
TABLET, CHEWABLE ORAL
Status: COMPLETED
Start: 2021-06-18 | End: 2021-06-18

## 2021-06-18 RX ORDER — SODIUM CHLORIDE 9 MG/ML
INJECTION, SOLUTION INTRAVENOUS CONTINUOUS
Status: DISCONTINUED | OUTPATIENT
Start: 2021-06-18 | End: 2021-06-18

## 2021-06-18 RX ORDER — METOPROLOL SUCCINATE 25 MG/1
25 TABLET, EXTENDED RELEASE ORAL NIGHTLY
Qty: 30 TABLET | Refills: 6 | Status: SHIPPED | OUTPATIENT
Start: 2021-06-18 | End: 2021-07-12

## 2021-06-18 RX ORDER — LIDOCAINE HYDROCHLORIDE 10 MG/ML
INJECTION, SOLUTION EPIDURAL; INFILTRATION; INTRACAUDAL; PERINEURAL
Status: COMPLETED
Start: 2021-06-18 | End: 2021-06-18

## 2021-06-18 RX ADMIN — ASPIRIN 324 MG: 81 TABLET, CHEWABLE ORAL at 10:45:00

## 2021-06-18 NOTE — PROGRESS NOTES
Discharged to [facility] via wheel chair, all dc instructions given. Metoprolol and asa called into pharmacy, following up with Dr. Kitty Pulliam in office.

## 2021-06-18 NOTE — PROGRESS NOTES
Prelim angio    Mild LV dysfunction    RCA occluded with good collaterals    LCA okay    Medical therapy - follow up with me in [place] office    Add daily EC ASA 81 mg to regimen plus Toprol XL 25 mg qhs

## 2021-06-19 NOTE — PROCEDURES
[facility]    PATIENT'S NAME: Amanda Lynch   ATTENDING PHYSICIAN: David Ruiz M.D. OPERATING PHYSICIAN: David Ruiz M.D.    PATIENT ACCOUNT#:   [de-identified]    LOCATION:  [facility] EDWP  MEDICAL RECORD #:   [id number]       D and densely calcified. Left main coronary artery had no angiographic evidence of significant obstructive atherosclerosis. Left anterior descending coronary artery had nonobstructive intimal thickening in the proximal and mid portions.     Left circumf

## 2021-06-21 RX ORDER — BUDESONIDE AND FORMOTEROL FUMARATE DIHYDRATE 160; 4.5 UG/1; UG/1
2 AEROSOL RESPIRATORY (INHALATION) 2 TIMES DAILY
Qty: 1 EACH | Refills: 2 | Status: SHIPPED | OUTPATIENT
Start: 2021-06-21 | End: 2021-11-23

## 2021-06-21 NOTE — TELEPHONE ENCOUNTER
Asthma & COPD Medication Protocol Zwtttg6206/19/2021 12:01 PM   Asthma Action Score greater than or equal to 20    AAP/ACT given in last 12 months    Appointment in past 6 or next 3 months      LOV:  4/14/21   Last Refill: 12/28/20 1 inhaler 1

## 2021-06-24 ENCOUNTER — OFFICE VISIT (OUTPATIENT)
Dept: FAMILY MEDICINE CLINIC | Facility: CLINIC | Age: 76
End: 2021-06-24
Payer: MEDICARE

## 2021-06-24 ENCOUNTER — OFFICE VISIT (OUTPATIENT)
Dept: CARDIOLOGY | Age: 76
End: 2021-06-24

## 2021-06-24 VITALS
DIASTOLIC BLOOD PRESSURE: 70 MMHG | HEART RATE: 56 BPM | SYSTOLIC BLOOD PRESSURE: 130 MMHG | RESPIRATION RATE: 20 BRPM | WEIGHT: 227.38 LBS | BODY MASS INDEX: 32 KG/M2 | TEMPERATURE: 98 F

## 2021-06-24 VITALS
WEIGHT: 226.8 LBS | HEART RATE: 53 BPM | DIASTOLIC BLOOD PRESSURE: 68 MMHG | BODY MASS INDEX: 31.75 KG/M2 | HEIGHT: 71 IN | SYSTOLIC BLOOD PRESSURE: 134 MMHG

## 2021-06-24 DIAGNOSIS — H93.8X2 MASS OF LEFT EAR: Primary | ICD-10-CM

## 2021-06-24 DIAGNOSIS — I25.10 ATHEROSCLEROSIS OF CORONARY ARTERY OF NATIVE HEART WITHOUT ANGINA PECTORIS, UNSPECIFIED VESSEL OR LESION TYPE: ICD-10-CM

## 2021-06-24 DIAGNOSIS — E11.9 CONTROLLED TYPE 2 DIABETES MELLITUS WITHOUT COMPLICATION, WITHOUT LONG-TERM CURRENT USE OF INSULIN (HCC): ICD-10-CM

## 2021-06-24 DIAGNOSIS — R00.1 BRADYCARDIA, UNSPECIFIED: Primary | ICD-10-CM

## 2021-06-24 PROBLEM — R94.31 ABNORMAL ELECTROCARDIOGRAM (ECG) (EKG): Status: ACTIVE | Noted: 2021-05-13

## 2021-06-24 PROBLEM — J43.2 CENTRILOBULAR EMPHYSEMA (HCC): Status: ACTIVE | Noted: 2021-05-13

## 2021-06-24 PROBLEM — R94.39 ABNORMAL NUCLEAR STRESS TEST: Status: ACTIVE | Noted: 2021-05-13

## 2021-06-24 PROCEDURE — 99213 OFFICE O/P EST LOW 20 MIN: CPT | Performed by: FAMILY MEDICINE

## 2021-06-24 PROCEDURE — 99214 OFFICE O/P EST MOD 30 MIN: CPT | Performed by: NURSE PRACTITIONER

## 2021-06-24 PROCEDURE — 93000 ELECTROCARDIOGRAM COMPLETE: CPT | Performed by: NURSE PRACTITIONER

## 2021-06-24 PROCEDURE — 3075F SYST BP GE 130 - 139MM HG: CPT | Performed by: FAMILY MEDICINE

## 2021-06-24 PROCEDURE — 1111F DSCHRG MED/CURRENT MED MERGE: CPT | Performed by: FAMILY MEDICINE

## 2021-06-24 PROCEDURE — 3078F DIAST BP <80 MM HG: CPT | Performed by: FAMILY MEDICINE

## 2021-06-24 RX ORDER — METOPROLOL SUCCINATE 25 MG/1
25 TABLET, EXTENDED RELEASE ORAL DAILY
COMMUNITY
Start: 2021-06-18 | End: 2021-06-24 | Stop reason: SDUPTHER

## 2021-06-24 RX ORDER — METOPROLOL SUCCINATE 25 MG/1
12.5 TABLET, EXTENDED RELEASE ORAL DAILY
Qty: 15 TABLET | Refills: 11 | Status: SHIPPED | COMMUNITY
Start: 2021-06-24

## 2021-06-24 RX ORDER — ASPIRIN 81 MG/1
81 TABLET ORAL
COMMUNITY
Start: 2021-06-18

## 2021-06-24 ASSESSMENT — PATIENT HEALTH QUESTIONNAIRE - PHQ9
CLINICAL INTERPRETATION OF PHQ2 SCORE: NO FURTHER SCREENING NEEDED
SUM OF ALL RESPONSES TO PHQ9 QUESTIONS 1 AND 2: 0
SUM OF ALL RESPONSES TO PHQ9 QUESTIONS 1 AND 2: 0
2. FEELING DOWN, DEPRESSED OR HOPELESS: NOT AT ALL
CLINICAL INTERPRETATION OF PHQ9 SCORE: NO FURTHER SCREENING NEEDED
1. LITTLE INTEREST OR PLEASURE IN DOING THINGS: NOT AT ALL

## 2021-06-24 NOTE — PROGRESS NOTES
Fco Devi is a 68year old male. HPI:   Senait Bansal is here for evaluation of an issues with his left ear, that developed last week where it became irritable  and swollen.  There ei sno redness, he has not had a fever, he has tried to squeeze it and ca HCl 1000 MG Oral Tab Take 500 mg by mouth daily with breakfast.       • tamsulosin (FLOMAX) cap Take 0.4 mg by mouth every evening. • rOPINIRole HCl 5 MG Oral Tab Take 5 mg by mouth 3 (three) times daily.      • Melatonin 10 MG Oral Tab Take 2 tablets b tolerated the procedure well, then attempted to aspirate from the site with an 18 gauge syringe, but was not able to withdraw or express anything from the  the site.  It  was dressed with an appropriate dressing, call if any increased erythema, pinna or unu

## 2021-07-07 ENCOUNTER — TELEPHONE (OUTPATIENT)
Dept: FAMILY MEDICINE CLINIC | Facility: CLINIC | Age: 76
End: 2021-07-07

## 2021-07-07 ENCOUNTER — NURSE ONLY (OUTPATIENT)
Dept: FAMILY MEDICINE CLINIC | Facility: CLINIC | Age: 76
End: 2021-07-07
Payer: MEDICARE

## 2021-07-07 DIAGNOSIS — R30.0 DYSURIA: Primary | ICD-10-CM

## 2021-07-07 LAB
BILIRUBIN: NEGATIVE
GLUCOSE (URINE DIPSTICK): NEGATIVE MG/DL
KETONES (URINE DIPSTICK): NEGATIVE MG/DL
MULTISTIX LOT#: 5077 NUMERIC
NITRITE, URINE: POSITIVE
PH, URINE: 5.5 (ref 4.5–8)
PROTEIN (URINE DIPSTICK): >=300 MG/DL
SPECIFIC GRAVITY: >=1.03 (ref 1–1.03)
URINE-COLOR: YELLOW
UROBILINOGEN,SEMI-QN: 0.2 MG/DL (ref 0–1.9)

## 2021-07-07 PROCEDURE — 81003 URINALYSIS AUTO W/O SCOPE: CPT | Performed by: FAMILY MEDICINE

## 2021-07-07 PROCEDURE — 87186 SC STD MICRODIL/AGAR DIL: CPT | Performed by: FAMILY MEDICINE

## 2021-07-07 PROCEDURE — 87086 URINE CULTURE/COLONY COUNT: CPT | Performed by: FAMILY MEDICINE

## 2021-07-07 PROCEDURE — 87077 CULTURE AEROBIC IDENTIFY: CPT | Performed by: FAMILY MEDICINE

## 2021-07-07 RX ORDER — CIPROFLOXACIN 500 MG/1
500 TABLET, FILM COATED ORAL 2 TIMES DAILY
Qty: 14 TABLET | Refills: 0 | Status: SHIPPED | OUTPATIENT
Start: 2021-07-07 | End: 2021-07-14

## 2021-07-07 NOTE — TELEPHONE ENCOUNTER
----- Message from Tamiko Comer DO sent at 7/7/2021  8:16 AM CDT -----  Urine sent for culture, ABX sent to pharmacy Cipro 500 mg po bid for 7 days.

## 2021-07-07 NOTE — TELEPHONE ENCOUNTER
Pt started shivering this morning- temp is 100.7 this morning    Pt denies flank pain, pressure- no blood din the urine per report by daughter.     Per verbal by DS get urine sample and start with in office dip    Future Appointments   Date Time Provider Linda Vallecillo

## 2021-07-12 ENCOUNTER — OFFICE VISIT (OUTPATIENT)
Dept: ORTHOPEDICS CLINIC | Facility: CLINIC | Age: 76
End: 2021-07-12
Payer: MEDICARE

## 2021-07-12 DIAGNOSIS — M17.12 ARTHRITIS OF LEFT KNEE: Primary | ICD-10-CM

## 2021-07-12 PROCEDURE — 99213 OFFICE O/P EST LOW 20 MIN: CPT | Performed by: ORTHOPAEDIC SURGERY

## 2021-07-12 RX ORDER — TRAMADOL HYDROCHLORIDE 50 MG/1
50 TABLET ORAL EVERY 8 HOURS PRN
Qty: 30 TABLET | Refills: 0 | Status: SHIPPED | OUTPATIENT
Start: 2021-07-12 | End: 2021-08-09

## 2021-07-12 RX ORDER — METOPROLOL SUCCINATE 25 MG/1
12.5 TABLET, EXTENDED RELEASE ORAL DAILY
COMMUNITY
Start: 2021-06-24

## 2021-07-12 RX ORDER — GLIPIZIDE 5 MG/1
TABLET ORAL
COMMUNITY
Start: 2021-06-28 | End: 2021-09-24

## 2021-07-12 RX ORDER — MELOXICAM 7.5 MG/1
7.5 TABLET ORAL DAILY
Qty: 30 TABLET | Refills: 0 | Status: SHIPPED | OUTPATIENT
Start: 2021-07-12 | End: 2021-08-09

## 2021-07-12 NOTE — PROGRESS NOTES
[facility] Progress Note    Subjective: Patient comes in today to discuss his left knee.   He is hoping to proceed with knee replacement surgery, however he and his daughter discussed that he recently had shaking chills as well as a temperature of 100 other etiologies of systemic symptoms of illness, and if no other source identified, for potentially another opinion on treatment options for his Pseudomonas and whether this is asymptomatic bacteriuria/colonization or whether there is anything further sudarshan

## 2021-08-07 RX ORDER — SITAGLIPTIN 50 MG/1
TABLET, FILM COATED ORAL
Qty: 30 TABLET | Refills: 3 | Status: SHIPPED | OUTPATIENT
Start: 2021-08-07 | End: 2021-12-27

## 2021-08-07 NOTE — TELEPHONE ENCOUNTER
LOV with DS 6/24/21  No future appt  Last A1C 5/24/21    Diabetic Medication Protocol Ebjrav8108/07/2021 11:31 AM   Microalbumin procedure in past 12 months or taking ACE/ARB Protocol Details    HgBA1C procedure resulted in past 6 months     Last HgBA1C < 7.

## 2021-08-09 ENCOUNTER — MED REC SCAN ONLY (OUTPATIENT)
Dept: FAMILY MEDICINE CLINIC | Facility: CLINIC | Age: 76
End: 2021-08-09

## 2021-08-09 RX ORDER — TRAMADOL HYDROCHLORIDE 50 MG/1
TABLET ORAL
Qty: 21 TABLET | Refills: 1 | Status: SHIPPED | OUTPATIENT
Start: 2021-08-09

## 2021-08-09 RX ORDER — MELOXICAM 7.5 MG/1
TABLET ORAL
Qty: 30 TABLET | Refills: 0 | Status: SHIPPED | OUTPATIENT
Start: 2021-08-09 | End: 2021-08-16

## 2021-08-09 NOTE — TELEPHONE ENCOUNTER
LOV: 7/12/21  RTC: a few months  Filled:   Medication Quantity Refills Start End   Meloxicam 7.5 MG Oral Tab 30 tablet 0 7/12/2021    Sig:   Take 1 tablet (7.5 mg total) by mouth daily.        Medication Quantity Refills Start End   traMADol HCl 50 MG Oral

## 2021-08-14 ENCOUNTER — NURSE ONLY (OUTPATIENT)
Dept: FAMILY MEDICINE CLINIC | Facility: CLINIC | Age: 76
End: 2021-08-14
Payer: MEDICARE

## 2021-08-14 ENCOUNTER — TELEPHONE (OUTPATIENT)
Dept: FAMILY MEDICINE CLINIC | Facility: CLINIC | Age: 76
End: 2021-08-14

## 2021-08-14 DIAGNOSIS — R73.09 ELEVATED GLUCOSE: ICD-10-CM

## 2021-08-14 DIAGNOSIS — R30.0 DYSURIA: Primary | ICD-10-CM

## 2021-08-14 LAB
BILIRUBIN: NEGATIVE
GLUCOSE (URINE DIPSTICK): NEGATIVE MG/DL
KETONES (URINE DIPSTICK): NEGATIVE MG/DL
MULTISTIX LOT#: 5077 NUMERIC
NITRITE, URINE: NEGATIVE
PH, URINE: 5.5 (ref 4.5–8)
PROTEIN (URINE DIPSTICK): >=300 MG/DL
SPECIFIC GRAVITY: >1.03 (ref 1–1.03)
URINE-COLOR: YELLOW
UROBILINOGEN,SEMI-QN: 0.2 MG/DL (ref 0–1.9)

## 2021-08-14 PROCEDURE — 87086 URINE CULTURE/COLONY COUNT: CPT | Performed by: FAMILY MEDICINE

## 2021-08-14 PROCEDURE — 87186 SC STD MICRODIL/AGAR DIL: CPT | Performed by: FAMILY MEDICINE

## 2021-08-14 PROCEDURE — 87077 CULTURE AEROBIC IDENTIFY: CPT | Performed by: FAMILY MEDICINE

## 2021-08-14 PROCEDURE — 81003 URINALYSIS AUTO W/O SCOPE: CPT | Performed by: FAMILY MEDICINE

## 2021-08-14 NOTE — PROGRESS NOTES
Pt was in office to provider urine for testing    Daughter advised that pt sugar was high this morning and he was having some pain    In office dip done and in office provider reviewed and advised to send for testing      Per verbal order by KE- please sen

## 2021-08-14 NOTE — TELEPHONE ENCOUNTER
Patient's daughter John Werner) called requesting a possible nurse appt for today. Possible  Recurrent UTI. BS is high.     Please call back # [phone]

## 2021-08-14 NOTE — TELEPHONE ENCOUNTER
RN spoke with daughter- his sugars are getting higher which is not like him    RN put pt on Nurse schedule for in office dip and possible culture. No future appointments.

## 2021-08-16 RX ORDER — MELOXICAM 7.5 MG/1
TABLET ORAL
Qty: 30 TABLET | Refills: 0 | Status: SHIPPED | OUTPATIENT
Start: 2021-08-16 | End: 2021-09-22

## 2021-08-16 NOTE — TELEPHONE ENCOUNTER
LOV:  7/12/21  RTC: as needed, in a few months  Filled:    Disp Refills Start End    MELOXICAM 7.5 MG Oral Tab 30 tablet 0 8/9/2021     Sig: TAKE 1 TABLET BY MOUTH EVERY DAY    Sent to pharmacy as: Meloxicam 7.5 MG Oral Tablet    E-Prescribing Status: Rece

## 2021-08-24 RX ORDER — BLOOD SUGAR DIAGNOSTIC
STRIP MISCELLANEOUS
Qty: 200 STRIP | Refills: 0 | Status: SHIPPED | OUTPATIENT
Start: 2021-08-24 | End: 2021-11-23

## 2021-09-21 ENCOUNTER — IMMUNIZATION (OUTPATIENT)
Dept: FAMILY MEDICINE CLINIC | Facility: CLINIC | Age: 76
End: 2021-09-21
Payer: MEDICARE

## 2021-09-21 DIAGNOSIS — Z23 NEED FOR VACCINATION: Primary | ICD-10-CM

## 2021-09-21 PROCEDURE — G0008 ADMIN INFLUENZA VIRUS VAC: HCPCS | Performed by: FAMILY MEDICINE

## 2021-09-21 PROCEDURE — 90662 IIV NO PRSV INCREASED AG IM: CPT | Performed by: FAMILY MEDICINE

## 2021-09-22 RX ORDER — MELOXICAM 7.5 MG/1
TABLET ORAL
Qty: 30 TABLET | Refills: 0 | Status: SHIPPED | OUTPATIENT
Start: 2021-09-22 | End: 2021-10-21

## 2021-09-22 NOTE — TELEPHONE ENCOUNTER
LOV: 7/12/21  RTC: a couple of months  Filled:   Medication Quantity Refills Start End   MELOXICAM 7.5 MG Oral Tab 30 tablet 0 8/16/2021    Sig:   TAKE 1 TABLET BY MOUTH EVERY DAY     Route:   (none)         No future appointments.     Rx pended for approva

## 2021-09-24 RX ORDER — LANCETS 33 GAUGE
EACH MISCELLANEOUS
Qty: 200 EACH | Refills: 1 | Status: SHIPPED | OUTPATIENT
Start: 2021-09-24

## 2021-09-24 RX ORDER — GLIPIZIDE 5 MG/1
TABLET ORAL
Qty: 180 TABLET | Refills: 1 | Status: SHIPPED | OUTPATIENT
Start: 2021-09-24 | End: 2021-12-28

## 2021-09-24 NOTE — TELEPHONE ENCOUNTER
Diabetic Medication Protocol Failed 09/24/2021 09:02 AM   Protocol Details  Microalbumin procedure in past 12 months or taking ACE/ARB    HgBA1C procedure resulted in past 6 months    Last HgBA1C < 7.5    Appointment in past 6 or next 3 months        Last

## 2021-10-13 ENCOUNTER — MED REC SCAN ONLY (OUTPATIENT)
Dept: FAMILY MEDICINE CLINIC | Facility: CLINIC | Age: 76
End: 2021-10-13

## 2021-10-21 RX ORDER — MELOXICAM 7.5 MG/1
TABLET ORAL
Qty: 30 TABLET | Refills: 0 | Status: SHIPPED | OUTPATIENT
Start: 2021-10-21

## 2021-10-21 NOTE — TELEPHONE ENCOUNTER
LOV: 7/12/21  RTC: couple of months  Filled:   Medication Quantity Refills Start End   MELOXICAM 7.5 MG Oral Tab 30 tablet 0 9/22/2021    Sig:   TAKE 1 TABLET BY MOUTH EVERY DAY         No future appointments.     Rx pended for approval

## 2021-11-05 RX ORDER — FINASTERIDE 5 MG/1
TABLET, FILM COATED ORAL
Qty: 90 TABLET | Refills: 2 | Status: SHIPPED | OUTPATIENT
Start: 2021-11-05

## 2021-11-05 NOTE — TELEPHONE ENCOUNTER
No refill protocol for this medication. Last refill: 2/16/2021 #90 with 2 refills  Last Visit: 7/07/2021   Next Visit: No future appointments. Forward to Dr. David Leahy please advise on refills. Thanks.

## 2021-11-11 ENCOUNTER — TELEPHONE (OUTPATIENT)
Dept: FAMILY MEDICINE CLINIC | Facility: CLINIC | Age: 76
End: 2021-11-11

## 2021-11-11 NOTE — TELEPHONE ENCOUNTER
's office calling requesting recent labs, radiology report and office notes       Please fax them at # [phone]    Phone # [phone]    Thank you

## 2021-11-12 ENCOUNTER — MED REC SCAN ONLY (OUTPATIENT)
Dept: FAMILY MEDICINE CLINIC | Facility: CLINIC | Age: 76
End: 2021-11-12

## 2021-11-16 ENCOUNTER — PATIENT MESSAGE (OUTPATIENT)
Dept: ORTHOPEDICS CLINIC | Facility: CLINIC | Age: 76
End: 2021-11-16

## 2021-11-22 NOTE — TELEPHONE ENCOUNTER
Future Appointments   Date Time Provider Kris Beckman   11/23/2021 10:20 AM Samantha Peters [facility] Brittany Cutting   12/1/2021 11:00 AM Nba Oleary MD [facility]

## 2021-11-22 NOTE — TELEPHONE ENCOUNTER
From: Vega Puga  To: Amanda Montaño MD  Sent: 11/16/2021 1:45 PM CST  Subject: Infectious Disease    This message is being sent by Lexie Zapata on behalf of Vega Puga.     Dr. Karina Henry,  Dad saw ID and she said there is little concern ab

## 2021-11-23 ENCOUNTER — OFFICE VISIT (OUTPATIENT)
Dept: FAMILY MEDICINE CLINIC | Facility: CLINIC | Age: 76
End: 2021-11-23
Payer: MEDICARE

## 2021-11-23 VITALS
DIASTOLIC BLOOD PRESSURE: 58 MMHG | RESPIRATION RATE: 20 BRPM | TEMPERATURE: 98 F | WEIGHT: 235.81 LBS | SYSTOLIC BLOOD PRESSURE: 100 MMHG | HEART RATE: 64 BPM | BODY MASS INDEX: 33 KG/M2

## 2021-11-23 DIAGNOSIS — E11.9 CONTROLLED TYPE 2 DIABETES MELLITUS WITHOUT COMPLICATION, WITHOUT LONG-TERM CURRENT USE OF INSULIN (HCC): Primary | ICD-10-CM

## 2021-11-23 DIAGNOSIS — E78.2 MIXED HYPERLIPIDEMIA: ICD-10-CM

## 2021-11-23 PROBLEM — E11.22 TYPE 2 DIABETES MELLITUS WITH DIABETIC CHRONIC KIDNEY DISEASE (HCC): Status: ACTIVE | Noted: 2021-11-23

## 2021-11-23 PROBLEM — F33.9 DEPRESSION, RECURRENT: Status: ACTIVE | Noted: 2021-11-23

## 2021-11-23 PROBLEM — F33.9 DEPRESSION, RECURRENT (HCC): Status: ACTIVE | Noted: 2021-11-23

## 2021-11-23 PROCEDURE — 82043 UR ALBUMIN QUANTITATIVE: CPT | Performed by: FAMILY MEDICINE

## 2021-11-23 PROCEDURE — 3078F DIAST BP <80 MM HG: CPT | Performed by: FAMILY MEDICINE

## 2021-11-23 PROCEDURE — 82570 ASSAY OF URINE CREATININE: CPT | Performed by: FAMILY MEDICINE

## 2021-11-23 PROCEDURE — 83036 HEMOGLOBIN GLYCOSYLATED A1C: CPT | Performed by: FAMILY MEDICINE

## 2021-11-23 PROCEDURE — 3074F SYST BP LT 130 MM HG: CPT | Performed by: FAMILY MEDICINE

## 2021-11-23 PROCEDURE — 99214 OFFICE O/P EST MOD 30 MIN: CPT | Performed by: FAMILY MEDICINE

## 2021-11-23 RX ORDER — FLUTICASONE PROPIONATE AND SALMETEROL 113; 14 UG/1; UG/1
113 POWDER, METERED RESPIRATORY (INHALATION) 2 TIMES DAILY
Qty: 1 EACH | Refills: 3 | Status: SHIPPED | OUTPATIENT
Start: 2021-11-23 | End: 2021-12-23

## 2021-11-23 RX ORDER — CITALOPRAM 20 MG/1
1 TABLET ORAL DAILY
COMMUNITY
Start: 2021-10-20 | End: 2022-01-15

## 2021-11-23 RX ORDER — BLOOD SUGAR DIAGNOSTIC
STRIP MISCELLANEOUS
Qty: 200 STRIP | Refills: 0 | Status: SHIPPED | OUTPATIENT
Start: 2021-11-23

## 2021-11-23 NOTE — PROGRESS NOTES
HPI:   Gisell Ham is a 68year old male who presents for recheck of his diabetes. Patient’s FBS have been 200 plus. Last visit with ophthalmologist was 11/2021. Pt has not been checking his feet on a regular basis.  Pt reports any tingling of the fe Activated Inhale 113 mcg into the lungs 2 (two) times a day.  1 each 3   • FINASTERIDE 5 MG Oral Tab TAKE 1 TABLET BY MOUTH EVERY DAY 90 tablet 2   • MELOXICAM 7.5 MG Oral Tab TAKE 1 TABLET BY MOUTH EVERY DAY 30 tablet 0   • GLIPIZIDE 5 MG Oral Tab TAKE 2 T Diagnosis Date   • COPD (chronic obstructive pulmonary disease) ([facility]    • Diabetes ([facility]    • High cholesterol       Past Surgical History:   Procedure Laterality Date   • ANGIOGRAM  06/18/2021    Dr Carmen Kilgore   • APPENDECTOMY     • LUMBAR SPINE SURGERY Signed Prescriptions Disp Refills   • Fluticasone-Salmeterol (AIRDUO RESPICLICK 101/03) 086-94 MCG/ACT Inhalation Aerosol Powder, Breath Activated 1 each 3     Sig: Inhale 113 mcg into the lungs 2 (two) times a day.      WILL TRY AND SEE WHAT ELSE IS ON

## 2021-11-26 DIAGNOSIS — E11.9 CONTROLLED TYPE 2 DIABETES MELLITUS WITHOUT COMPLICATION, WITHOUT LONG-TERM CURRENT USE OF INSULIN (HCC): Primary | ICD-10-CM

## 2021-11-30 ENCOUNTER — TELEPHONE (OUTPATIENT)
Dept: FAMILY MEDICINE CLINIC | Facility: CLINIC | Age: 76
End: 2021-11-30

## 2021-11-30 RX ORDER — CEFDINIR 300 MG/1
300 CAPSULE ORAL 2 TIMES DAILY
Qty: 14 CAPSULE | Refills: 0 | Status: SHIPPED | OUTPATIENT
Start: 2021-11-30 | End: 2021-12-07

## 2021-11-30 NOTE — TELEPHONE ENCOUNTER
Pt was in ER over the weekend and started on IV ABX    Pt has had 3 doses of Levaquin for UTI    Daughter called today to advise that pt has been having pain traveling down his arm and into his wrist- likely d/t the medication    Per verbal with LICO cifuentes

## 2021-12-02 ENCOUNTER — TELEPHONE (OUTPATIENT)
Dept: FAMILY MEDICINE CLINIC | Facility: CLINIC | Age: 76
End: 2021-12-02

## 2021-12-02 RX ORDER — METHYLPREDNISOLONE 4 MG/1
TABLET ORAL
Qty: 1 EACH | Refills: 0 | Status: SHIPPED | OUTPATIENT
Start: 2021-12-02 | End: 2021-12-28

## 2021-12-02 NOTE — TELEPHONE ENCOUNTER
DTR called and pt was in the ER and needs to have a follow up- discharge paperwork states follow up in 3 days    Was discharged with pain medication and anti inflammatory-  Dx was a pinched nerve    He also picked up the steroids that were prescribed this

## 2021-12-06 ENCOUNTER — TELEPHONE (OUTPATIENT)
Dept: FAMILY MEDICINE CLINIC | Facility: CLINIC | Age: 76
End: 2021-12-06

## 2021-12-06 RX ORDER — HYDROCODONE BITARTRATE AND ACETAMINOPHEN 5; 325 MG/1; MG/1
TABLET ORAL
Qty: 30 TABLET | Refills: 0 | Status: SHIPPED | OUTPATIENT
Start: 2021-12-06 | End: 2021-12-14

## 2021-12-06 NOTE — TELEPHONE ENCOUNTER
Spoke with daughter- he is not having neck pain    When he puts the arm above his head ( straight tor bent) the pain is gone    As soon as he brings the arm back down- the pain returns    Daughter tried TENS unit- it made it worse.     Steroid has not helpe

## 2021-12-06 NOTE — TELEPHONE ENCOUNTER
I think he needs to follow up with ortho? He may benefit from an  Injection or something especially since it's Elbow and wrist. He' s not having any neck pain?

## 2021-12-06 NOTE — TELEPHONE ENCOUNTER
DAUGHTER CALLED AND ADV THAT PTS ARM PAIN IS THE SAME, NOTHING HAS CHANGED. EXTREME PAIN FROM WRIST TO ELBOW. PREDNISONE WILL BE FINISHED TOMORROW AND PT NOT ABLE TO SLEEP DUE TO PAIN    WOULD LIKE TO KNOW IF YOU WANT TO SEE PT OR REFER HIM OUT.         PLE

## 2021-12-06 NOTE — TELEPHONE ENCOUNTER
So he needs to call and see if they can get him in sooner.  I can call in some Norco until he can get in, but it may constipate, lets start with 5 mg and see if that helps otherwise we can adjust it

## 2021-12-15 RX ORDER — HYDROCODONE BITARTRATE AND ACETAMINOPHEN 5; 325 MG/1; MG/1
TABLET ORAL
Qty: 30 TABLET | Refills: 0 | Status: SHIPPED | OUTPATIENT
Start: 2021-12-15 | End: 2022-01-17

## 2021-12-15 NOTE — TELEPHONE ENCOUNTER
Routing to provider per protocol. HYDROcodone-acetaminophen (NORCO) 5-325 MG Oral Tab  Last refilled on 12/6/21 for #30  with 0 rf. Last labs 11/23/21. Last seen on 11/23/21.      Future Appointments   Date Time Provider Kris Beckman   1/19/2022

## 2021-12-27 NOTE — TELEPHONE ENCOUNTER
Diabetic Medication Protocol Failed 12/24/2021 12:24 AM   Protocol Details  Last HgBA1C < 7.5    HgBA1C procedure resulted in past 6 months    Microalbumin procedure in past 12 months or taking ACE/ARB    Appointment in past 6 or next 3 months        Routing to provider per protocol. Last refilled on 8/7/21 for # 30 with 3 rf. Last seen on 11/23/21. Future Appointments   Date Time Provider Kris Beckman   1/19/2022 10:20 AM Kimberly Woodard MD [facility]        Thank you.

## 2021-12-28 ENCOUNTER — TELEPHONE (OUTPATIENT)
Dept: FAMILY MEDICINE CLINIC | Facility: CLINIC | Age: 76
End: 2021-12-28

## 2021-12-28 RX ORDER — LEVOFLOXACIN 500 MG/1
500 TABLET, FILM COATED ORAL DAILY
Qty: 10 TABLET | Refills: 0 | Status: SHIPPED | OUTPATIENT
Start: 2021-12-28 | End: 2022-01-07

## 2021-12-28 NOTE — TELEPHONE ENCOUNTER
I can send in more, but can we make sure we finish the entire course of ABX?,and not save any for a rainy day

## 2021-12-28 NOTE — TELEPHONE ENCOUNTER
Message from Daughter:    Rufina Cochran can you let CP know that Dad has another UTI- Started on Sunday with rigors and fever. I didn't want to go to the hospital so I gave him the levaquin that he was prescribed last time (not allergic) but there was only 4 doses.

## 2022-01-13 ENCOUNTER — HOSPITAL ENCOUNTER (OUTPATIENT)
Dept: GENERAL RADIOLOGY | Age: 77
Discharge: HOME | End: 2022-01-13
Attending: FAMILY MEDICINE
Payer: MEDICARE

## 2022-01-13 ENCOUNTER — OFFICE VISIT (OUTPATIENT)
Dept: FAMILY MEDICINE CLINIC | Facility: CLINIC | Age: 77
End: 2022-01-13
Payer: MEDICARE

## 2022-01-13 VITALS
TEMPERATURE: 98 F | DIASTOLIC BLOOD PRESSURE: 70 MMHG | HEART RATE: 80 BPM | WEIGHT: 216.63 LBS | RESPIRATION RATE: 24 BRPM | SYSTOLIC BLOOD PRESSURE: 110 MMHG | BODY MASS INDEX: 30 KG/M2

## 2022-01-13 DIAGNOSIS — G56.02 CARPAL TUNNEL SYNDROME, LEFT: ICD-10-CM

## 2022-01-13 DIAGNOSIS — M79.642 HAND PAIN, LEFT: ICD-10-CM

## 2022-01-13 DIAGNOSIS — J43.2 CENTRILOBULAR EMPHYSEMA (HCC): ICD-10-CM

## 2022-01-13 DIAGNOSIS — Z01.818 PREOP EXAMINATION: ICD-10-CM

## 2022-01-13 DIAGNOSIS — Z01.818 PREOP EXAMINATION: Primary | ICD-10-CM

## 2022-01-13 DIAGNOSIS — E11.9 CONTROLLED TYPE 2 DIABETES MELLITUS WITHOUT COMPLICATION, WITHOUT LONG-TERM CURRENT USE OF INSULIN (HCC): ICD-10-CM

## 2022-01-13 LAB
ALBUMIN SERPL-MCNC: 3.6 G/DL (ref 3.4–5)
ALBUMIN/GLOB SERPL: 1.1 {RATIO} (ref 1–2)
ALP LIVER SERPL-CCNC: 120 U/L
ALT SERPL-CCNC: 35 U/L
ANION GAP SERPL CALC-SCNC: 5 MMOL/L (ref 0–18)
AST SERPL-CCNC: 21 U/L (ref 15–37)
BASOPHILS # BLD AUTO: 0.03 X10(3) UL (ref 0–0.2)
BASOPHILS NFR BLD AUTO: 0.5 %
BILIRUB SERPL-MCNC: 0.9 MG/DL (ref 0.1–2)
BUN BLD-MCNC: 19 MG/DL (ref 7–18)
CALCIUM BLD-MCNC: 9.5 MG/DL (ref 8.5–10.1)
CHLORIDE SERPL-SCNC: 102 MMOL/L (ref 98–112)
CO2 SERPL-SCNC: 30 MMOL/L (ref 21–32)
CREAT BLD-MCNC: 1.17 MG/DL
EOSINOPHIL # BLD AUTO: 0.14 X10(3) UL (ref 0–0.7)
EOSINOPHIL NFR BLD AUTO: 2.4 %
ERYTHROCYTE [DISTWIDTH] IN BLOOD BY AUTOMATED COUNT: 14.4 %
FASTING STATUS PATIENT QL REPORTED: NO
GLOBULIN PLAS-MCNC: 3.2 G/DL (ref 2.8–4.4)
GLUCOSE BLD-MCNC: 107 MG/DL (ref 70–99)
HCT VFR BLD AUTO: 40 %
HGB BLD-MCNC: 12.9 G/DL
IMM GRANULOCYTES # BLD AUTO: 0.05 X10(3) UL (ref 0–1)
IMM GRANULOCYTES NFR BLD: 0.9 %
LYMPHOCYTES # BLD AUTO: 0.97 X10(3) UL (ref 1–4)
LYMPHOCYTES NFR BLD AUTO: 16.9 %
MCH RBC QN AUTO: 27.6 PG (ref 26–34)
MCHC RBC AUTO-ENTMCNC: 32.3 G/DL (ref 31–37)
MCV RBC AUTO: 85.7 FL
MONOCYTES # BLD AUTO: 0.51 X10(3) UL (ref 0.1–1)
MONOCYTES NFR BLD AUTO: 8.9 %
NEUTROPHILS # BLD AUTO: 4.03 X10 (3) UL (ref 1.5–7.7)
NEUTROPHILS # BLD AUTO: 4.03 X10(3) UL (ref 1.5–7.7)
NEUTROPHILS NFR BLD AUTO: 70.4 %
OSMOLALITY SERPL CALC.SUM OF ELEC: 287 MOSM/KG (ref 275–295)
PLATELET # BLD AUTO: 352 10(3)UL (ref 150–450)
POTASSIUM SERPL-SCNC: 4.4 MMOL/L (ref 3.5–5.1)
PROT SERPL-MCNC: 6.8 G/DL (ref 6.4–8.2)
RBC # BLD AUTO: 4.67 X10(6)UL
SODIUM SERPL-SCNC: 137 MMOL/L (ref 136–145)
WBC # BLD AUTO: 5.7 X10(3) UL (ref 4–11)

## 2022-01-13 PROCEDURE — 99215 OFFICE O/P EST HI 40 MIN: CPT | Performed by: FAMILY MEDICINE

## 2022-01-13 PROCEDURE — 80053 COMPREHEN METABOLIC PANEL: CPT | Performed by: FAMILY MEDICINE

## 2022-01-13 PROCEDURE — 93000 ELECTROCARDIOGRAM COMPLETE: CPT | Performed by: FAMILY MEDICINE

## 2022-01-13 PROCEDURE — 3078F DIAST BP <80 MM HG: CPT | Performed by: FAMILY MEDICINE

## 2022-01-13 PROCEDURE — 87081 CULTURE SCREEN ONLY: CPT | Performed by: FAMILY MEDICINE

## 2022-01-13 PROCEDURE — 71046 X-RAY EXAM CHEST 2 VIEWS: CPT | Performed by: FAMILY MEDICINE

## 2022-01-13 PROCEDURE — 85025 COMPLETE CBC W/AUTO DIFF WBC: CPT | Performed by: FAMILY MEDICINE

## 2022-01-13 PROCEDURE — 3074F SYST BP LT 130 MM HG: CPT | Performed by: FAMILY MEDICINE

## 2022-01-13 NOTE — PROGRESS NOTES
David Gray is a 68year old male who presents for a pre-operative physical exam. Patient is to have LEFT CUBITAL TUNNEL RELEASE AND LEFT CARPAL TUNNEL REVISION ON 1/21/2022 BY DR. Quezada 18. times daily. Please send what is covered under patient formulary. DX  E11.9 DM2 controlled w/o insulin 1 kit 0   • clonazePAM 0.5 MG Oral Tab Take 0.5 mg by mouth nightly. • atorvastatin 40 MG Oral Tab Take 1 tablet by mouth daily.      • cyanocobalam denies nasal congestion, sinus pain or ST  LUNGS: denies shortness of breath with exertion  CARDIOVASCULAR: denies chest pain on exertion  GI: denies abdominal pain,denies heartburn  : denies dysuria, has  nocturia and  changes in stream  MUSCULOSKELETAL LEFT AXIS DEVIATION      Francisca Mitchell is a 68year old male who presents for a pre-operative physical exam. Patient is to have LEFT CUBITAL TUNNEL RELEASE AND LEFT CARPAL TUNNEL REVISION ON 1/21/2022 BY DR. Simpson S

## 2022-01-14 ENCOUNTER — TELEPHONE (OUTPATIENT)
Dept: FAMILY MEDICINE CLINIC | Facility: CLINIC | Age: 77
End: 2022-01-14

## 2022-01-14 NOTE — TELEPHONE ENCOUNTER
----- Message from Silvana Sanchez, DO sent at 1/14/2022  8:21 AM CST -----  Can notify Daughter his preop labs look good, as did his Chest Xray, there is some suggestion of COPD , and old granulomatous disease, but nothing to hold up surgery.  Please forward

## 2022-01-15 RX ORDER — CITALOPRAM 20 MG/1
TABLET ORAL
Qty: 90 TABLET | Refills: 2 | Status: SHIPPED | OUTPATIENT
Start: 2022-01-15

## 2022-01-15 NOTE — TELEPHONE ENCOUNTER
Last refill listed as historical  Last OV 1/13/22  Future Appointments   Date Time Provider Kris Karuna   1/19/2022 10:20 AM Pancho Ramirez MD [facility]        Thank you.

## 2022-01-18 RX ORDER — HYDROCODONE BITARTRATE AND ACETAMINOPHEN 5; 325 MG/1; MG/1
TABLET ORAL
Qty: 30 TABLET | Refills: 0 | Status: SHIPPED | OUTPATIENT
Start: 2022-01-18

## 2022-01-18 NOTE — TELEPHONE ENCOUNTER
Routing to provider per protocol. Last refilled on 12/15/21 for # 30 with 0 rf. Last seen on 1/13/22. Future Appointments   Date Time Provider Kris Karuna   1/19/2022 10:20 AM Susan Morel MD [facility]        Thank you.

## 2022-02-16 ENCOUNTER — APPOINTMENT (OUTPATIENT)
Dept: CARDIOLOGY | Facility: CLINIC | Age: 77
End: 2022-02-16

## 2022-03-01 ENCOUNTER — MED REC SCAN ONLY (OUTPATIENT)
Dept: ORTHOPEDICS CLINIC | Facility: CLINIC | Age: 77
End: 2022-03-01

## 2022-03-09 ENCOUNTER — TELEPHONE (OUTPATIENT)
Dept: FAMILY MEDICINE CLINIC | Facility: CLINIC | Age: 77
End: 2022-03-09

## 2022-03-09 LAB
A1C: 6.7 %
HDL CHOLESTEROL: 36 MG/DL
LDL CHOLESTEROL: 62 MG/DL (ref ?–130)
T4, FREE: 1.11
TRIGLYCERIDES: 316 MG/DL
VITAMIN B12: 833 PG/ML

## 2022-03-26 RX ORDER — GLIPIZIDE 5 MG/1
TABLET ORAL
Qty: 180 TABLET | Refills: 1 | Status: SHIPPED | OUTPATIENT
Start: 2022-03-26

## 2022-04-12 ENCOUNTER — TELEPHONE (OUTPATIENT)
Dept: FAMILY MEDICINE CLINIC | Facility: CLINIC | Age: 77
End: 2022-04-12

## 2022-04-12 NOTE — TELEPHONE ENCOUNTER
Pt is coming in   Future Appointments   Date Time Provider Kris Beckman   5/4/2022  8:20 AM Brennen Hall MD [facility]   6/1/2022  9:00 AM Nicole Noe [facility] Jamal Martinez     Pt is having neck surgery with Dr. Mikey Weeks

## 2022-05-03 ENCOUNTER — OFFICE VISIT (OUTPATIENT)
Dept: FAMILY MEDICINE CLINIC | Facility: CLINIC | Age: 77
End: 2022-05-03
Payer: MEDICARE

## 2022-05-03 VITALS
BODY MASS INDEX: 30 KG/M2 | SYSTOLIC BLOOD PRESSURE: 116 MMHG | HEART RATE: 68 BPM | RESPIRATION RATE: 20 BRPM | TEMPERATURE: 98 F | DIASTOLIC BLOOD PRESSURE: 66 MMHG | WEIGHT: 218.63 LBS

## 2022-05-03 DIAGNOSIS — M79.601 PARESTHESIA AND PAIN OF BOTH UPPER EXTREMITIES: ICD-10-CM

## 2022-05-03 DIAGNOSIS — M79.602 PARESTHESIA AND PAIN OF BOTH UPPER EXTREMITIES: ICD-10-CM

## 2022-05-03 DIAGNOSIS — E11.9 CONTROLLED TYPE 2 DIABETES MELLITUS WITHOUT COMPLICATION, WITHOUT LONG-TERM CURRENT USE OF INSULIN (HCC): ICD-10-CM

## 2022-05-03 DIAGNOSIS — I25.10 CORONARY ARTERY DISEASE INVOLVING NATIVE CORONARY ARTERY OF NATIVE HEART WITHOUT ANGINA PECTORIS: ICD-10-CM

## 2022-05-03 DIAGNOSIS — Z01.818 PREOP EXAMINATION: Primary | ICD-10-CM

## 2022-05-03 DIAGNOSIS — M54.12 LEFT CERVICAL RADICULOPATHY: ICD-10-CM

## 2022-05-03 DIAGNOSIS — R20.2 PARESTHESIA AND PAIN OF BOTH UPPER EXTREMITIES: ICD-10-CM

## 2022-05-03 DIAGNOSIS — M54.2 NECK PAIN, BILATERAL: ICD-10-CM

## 2022-05-03 PROBLEM — G56.22 CUBITAL TUNNEL SYNDROME, LEFT: Status: ACTIVE | Noted: 2022-01-03

## 2022-05-03 PROBLEM — G56.02 LEFT CARPAL TUNNEL SYNDROME: Status: ACTIVE | Noted: 2022-01-03

## 2022-05-03 PROCEDURE — 3074F SYST BP LT 130 MM HG: CPT | Performed by: FAMILY MEDICINE

## 2022-05-03 PROCEDURE — 99214 OFFICE O/P EST MOD 30 MIN: CPT | Performed by: FAMILY MEDICINE

## 2022-05-03 PROCEDURE — 3078F DIAST BP <80 MM HG: CPT | Performed by: FAMILY MEDICINE

## 2022-05-03 RX ORDER — FLUTICASONE PROPIONATE AND SALMETEROL 113; 14 UG/1; UG/1
POWDER, METERED RESPIRATORY (INHALATION)
Qty: 1 EACH | Refills: 3 | Status: SHIPPED | OUTPATIENT
Start: 2022-05-03

## 2022-05-04 ENCOUNTER — OFFICE VISIT (OUTPATIENT)
Dept: ORTHOPEDICS CLINIC | Facility: CLINIC | Age: 77
End: 2022-05-04
Payer: MEDICARE

## 2022-05-04 ENCOUNTER — HOSPITAL ENCOUNTER (OUTPATIENT)
Dept: GENERAL RADIOLOGY | Age: 77
Discharge: HOME | End: 2022-05-04
Attending: ORTHOPAEDIC SURGERY
Payer: MEDICARE

## 2022-05-04 VITALS — HEIGHT: 71 IN | OXYGEN SATURATION: 97 % | HEART RATE: 61 BPM | WEIGHT: 218 LBS | BODY MASS INDEX: 30.52 KG/M2

## 2022-05-04 DIAGNOSIS — M17.12 ARTHRITIS OF LEFT KNEE: ICD-10-CM

## 2022-05-04 DIAGNOSIS — M17.12 ARTHRITIS OF LEFT KNEE: Primary | ICD-10-CM

## 2022-05-04 PROCEDURE — 20610 DRAIN/INJ JOINT/BURSA W/O US: CPT | Performed by: ORTHOPAEDIC SURGERY

## 2022-05-04 PROCEDURE — 3008F BODY MASS INDEX DOCD: CPT | Performed by: ORTHOPAEDIC SURGERY

## 2022-05-04 PROCEDURE — 99213 OFFICE O/P EST LOW 20 MIN: CPT | Performed by: ORTHOPAEDIC SURGERY

## 2022-05-04 RX ORDER — TRIAMCINOLONE ACETONIDE 40 MG/ML
40 INJECTION, SUSPENSION INTRA-ARTICULAR; INTRAMUSCULAR ONCE
Status: COMPLETED | OUTPATIENT
Start: 2022-05-04 | End: 2022-05-04

## 2022-05-04 RX ADMIN — TRIAMCINOLONE ACETONIDE 40 MG: 40 INJECTION, SUSPENSION INTRA-ARTICULAR; INTRAMUSCULAR at 09:26:00

## 2022-05-04 NOTE — PROGRESS NOTES
Surgery Scheduling Sheet for Total Knee Arthroplasty  Name: Jake Drake  : [id number]    Procedure: Left Total Knee Arthroplasty   Diagnosis: Left Knee Arthritis   CPT Code: 65023  Anesthesia: Choice  Length of Surgery: 2 hours  Instruments: Christopher Ramsey TKA   Assist: Felix Linares preferred first assist ([phone]). If Mandy Pinna unavailable, then contact Bishop Tesha ([phone]). If Mandy Pinna and Bishop Tesha unavailable, then contact [facility] Surgical Assistants.   Second assist also needed per OR    TESTING NEEDED  PAT: MRSA/MSSA, Covid, CBC, CMP, INR, PTT, T&S  Medical Clearance: Yes  Cardiac Clearance: No    Dental Clearance: No      MEDICAL HISTORY   Pacemaker:   Cecy Given Allergy:   Latex Allergy:   Anticoagulants:   Medication Allergy:   Pre-Op sent to PCP:     Case Number:  Authorization Number:   PA Completed:   Home Health Order:   Care Partner:     Date of Surgery:   Post op Follow up:    Naga Whitten MD, [address] Orthopedic Surgery  Phone [phone]  Fax [phone]

## 2022-05-04 NOTE — PROCEDURES
After informed consent, the patient's left knee was marked, locally anesthetized with skin refrigerant, prepped with topical antiseptic, and injected with a mixture of 1mL 40mg/mL Kenalog, 2mL 1% lidocaine and 2mL 0.5% marcaine through the inferolateral portal.  A band-aid was applied. The patient tolerated the procedure well.     Ernestina Mckee MD, [address] Orthopedic Surgery  Phone [phone]  Fax [phone]

## 2022-05-04 NOTE — PROGRESS NOTES
[facility] Progress Note    Subjective: Patient returns for his left knee. He remains severely limited due to knee pain. States that it has gotten worse. He is ambulating with a cane in the right hand due to his left knee pain. He did see his urologist, was determined to have asymptomatic colonization of his urinary tract, no further treatment can be done or was recommended and patient has been determined to be optimized from that standpoint. He did have fever and chills in December, but no symptoms since then. The note was sent for scanning into epic. Of note patient is scheduled for cervical spine surgery in a few weeks. Objective: Patient is awake alert no distress. Left knee demonstrates varus alignment. There is no effusion, no warmth, no erythema. No wounds about the leg or foot. Sensation is intact SP DP tibial nerves, and he fires EHL tibialis anterior gastrocsoleus. The knee is tender to palpation about the medial joint line. He ambulates with a cane in the right hand. Imaging: X-rays of the left knee personally viewed, independently interpreted and radiology report read. Demonstrates severe varus osteoarthritis of the knee with complete loss of medial joint space/bone-on-bone, subchondral sclerosis and osteophytes      Labs: Most recent hemoglobin A1c 6.7      Assessment/Plan: 59-year-old male with symptomatic radiographically severe left knee osteoarthritis. The patient has reasonably attempted nonsurgical treatments as discussed on multiple occasions and remains limited in activities of daily living secondary to pain from knee arthritis. I discussed risks and benefits of surgery, with risks including but not limited to infection, blood clots, fracture, bleeding/need for blood transfusion, injury to blood vessels and nerves, loosening or failure of components, stiffness, continued pain, need for further intervention or revision surgery.   Additionally I discussed expectations with regards to the postoperative recovery timeframe, the possibility of mechanical clicking with the knee, numbness on the lateral side of the knee/leg from sacrifice of the infrapatellar branch of the saphenous nerve, and potential for difficulty/pain with kneeling and performing deep flexion activities. 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 He does understand and accept the risk of infection. We did discuss the importance of maintaining good diabetes control as well up until the time of surgery. The patient understands this discussion and elects to proceed with knee replacement surgery. He is looking to do this in August after he has recovered from his spine surgery in a few weeks. We did review a binder with him today. He did get updated x-rays. He will need primary care evaluation and clearance within 30 days of surgery.     Gini Venegas MD, [address] [place] [address] Orthopedic Surgery  Phone [phone]  Fax [phone]

## 2022-05-05 ENCOUNTER — HOSPITAL ENCOUNTER (OUTPATIENT)
Dept: GENERAL RADIOLOGY | Facility: HOSPITAL | Age: 77
Discharge: HOME | End: 2022-05-05
Attending: ORTHOPAEDIC SURGERY
Payer: MEDICARE

## 2022-05-05 PROCEDURE — 77073 BONE LENGTH STUDIES: CPT | Performed by: ORTHOPAEDIC SURGERY

## 2022-05-05 PROCEDURE — 73562 X-RAY EXAM OF KNEE 3: CPT | Performed by: ORTHOPAEDIC SURGERY

## 2022-05-06 ENCOUNTER — TELEPHONE (OUTPATIENT)
Dept: ORTHOPEDICS CLINIC | Facility: CLINIC | Age: 77
End: 2022-05-06

## 2022-05-06 DIAGNOSIS — M17.12 ARTHRITIS OF LEFT KNEE: Primary | ICD-10-CM

## 2022-05-06 NOTE — TELEPHONE ENCOUNTER
Surgeon: Dr. Vazquez Palafox    Date of Surgery: 10/14       Post Op Appt: 10/31 @ 1030AM     Facility: [address] [place] [address] or Outpatient: Inpatient    Surgical Assistant: yes    Preadmission Testing Ordered:  yes    Pre-Op Clearance Requested:   PCP: yes   Cardiac: no   Pulmonary: no   Dental: no   Other: no    DME: n/a    Rehab Services Ordered:   Home Health: yes   Outpatient: no    Is this work comp related? no    Prior Authorization: pending    Disability Paperwork: no    On [employer] Calendar: yes    Notes: PRE OP DISCUSSED VIA PHONE WITH PATIENTS DAUGHTER INSTRUCTIONS UNDERSTOOD.

## 2022-05-10 ENCOUNTER — TELEPHONE (OUTPATIENT)
Dept: FAMILY MEDICINE CLINIC | Facility: CLINIC | Age: 77
End: 2022-05-10

## 2022-05-10 NOTE — TELEPHONE ENCOUNTER
RN spoke with Mauricio Farr at [facility] preadmission    Advised that RN has information from Infectious disease regarding pts colonization in his urine    Was advised that once this information is received it will be available for Dr. Liliana Hansen to review    Faxed to [phone]

## 2022-05-12 NOTE — TELEPHONE ENCOUNTER
Called patient to get him schedualed for a synvisc one injection of the left knee received no answer LMTCB
FAMILY HISTORY:  No pertinent family history in first degree relatives

## 2022-05-13 ENCOUNTER — HOSPITAL ENCOUNTER (OUTPATIENT)
Dept: CV DIAGNOSTICS | Facility: HOSPITAL | Age: 77
Discharge: HOME | End: 2022-05-13
Attending: NURSE PRACTITIONER
Payer: MEDICARE

## 2022-05-13 ENCOUNTER — TELEPHONE (OUTPATIENT)
Dept: FAMILY MEDICINE CLINIC | Facility: CLINIC | Age: 77
End: 2022-05-13

## 2022-05-13 DIAGNOSIS — I25.10 CAD (CORONARY ARTERY DISEASE), NATIVE CORONARY ARTERY: ICD-10-CM

## 2022-05-13 DIAGNOSIS — R06.00 DOE (DYSPNEA ON EXERTION): ICD-10-CM

## 2022-05-13 PROCEDURE — 93018 CV STRESS TEST I&R ONLY: CPT | Performed by: NURSE PRACTITIONER

## 2022-05-13 PROCEDURE — 78452 HT MUSCLE IMAGE SPECT MULT: CPT | Performed by: NURSE PRACTITIONER

## 2022-05-13 PROCEDURE — 93017 CV STRESS TEST TRACING ONLY: CPT | Performed by: NURSE PRACTITIONER

## 2022-05-14 NOTE — TELEPHONE ENCOUNTER
LM for daughter that orders have been placed- do we need to send video swallow study anywhere?     Message advised for daughter to call or send message with any updated info

## 2022-05-28 ENCOUNTER — NURSE ONLY (OUTPATIENT)
Dept: FAMILY MEDICINE CLINIC | Facility: CLINIC | Age: 77
End: 2022-05-28
Payer: MEDICARE

## 2022-05-28 ENCOUNTER — TELEPHONE (OUTPATIENT)
Dept: FAMILY MEDICINE CLINIC | Facility: CLINIC | Age: 77
End: 2022-05-28

## 2022-05-28 DIAGNOSIS — R35.0 URINARY FREQUENCY: Primary | ICD-10-CM

## 2022-05-28 DIAGNOSIS — R35.0 URINARY FREQUENCY: ICD-10-CM

## 2022-05-28 PROCEDURE — 87186 SC STD MICRODIL/AGAR DIL: CPT | Performed by: FAMILY MEDICINE

## 2022-05-28 PROCEDURE — 87086 URINE CULTURE/COLONY COUNT: CPT | Performed by: FAMILY MEDICINE

## 2022-05-28 PROCEDURE — 87077 CULTURE AEROBIC IDENTIFY: CPT | Performed by: FAMILY MEDICINE

## 2022-05-28 RX ORDER — LEVOFLOXACIN 500 MG/1
500 TABLET, FILM COATED ORAL DAILY
Qty: 7 TABLET | Refills: 0 | Status: SHIPPED | OUTPATIENT
Start: 2022-05-28 | End: 2022-06-04

## 2022-05-28 NOTE — PROGRESS NOTES
Pt was in office to provider urine for testing    Celsa reports pt has been having urgency and dysuria    Per orders by VERO urine culture sent

## 2022-05-28 NOTE — TELEPHONE ENCOUNTER
Daughter is on her way to provide urine    She notes that pt has had Levaquin in the past and that he has tolerated medication    Allergy should be removed from pt med list- RN removed

## 2022-05-28 NOTE — TELEPHONE ENCOUNTER
Per daughter, patient has history of pseudomonas.     He is now having pain with urination  Urinary frequency  Lethargy at times    Please adv    Thank you

## 2022-05-28 NOTE — TELEPHONE ENCOUNTER
RN spoke with daughter- typically patient gets Urine Culture and 7 days of Levaquin    Daughter states they have appointment with infectious disease this coming week.     Routing to covering provider

## 2022-06-01 ENCOUNTER — TELEPHONE (OUTPATIENT)
Dept: FAMILY MEDICINE CLINIC | Facility: CLINIC | Age: 77
End: 2022-06-01

## 2022-06-01 ENCOUNTER — LAB ENCOUNTER (OUTPATIENT)
Dept: LAB | Age: 77
End: 2022-06-01
Attending: FAMILY MEDICINE
Payer: MEDICARE

## 2022-06-01 DIAGNOSIS — R13.10 SWALLOWING IMPAIRMENT: ICD-10-CM

## 2022-06-01 NOTE — TELEPHONE ENCOUNTER
As long as nothing else has changed since he was last here, I can go ahead and just addend the note, and I think we have clearance from everyone else.

## 2022-06-01 NOTE — TELEPHONE ENCOUNTER
DS saw pt for pre-op  5/3/22    Daughter is calling- do we need to see pt again prior to surgery on 6/8/22?

## 2022-06-02 ENCOUNTER — MED REC SCAN ONLY (OUTPATIENT)
Dept: FAMILY MEDICINE CLINIC | Facility: CLINIC | Age: 77
End: 2022-06-02

## 2022-06-02 ENCOUNTER — TELEPHONE (OUTPATIENT)
Dept: FAMILY MEDICINE CLINIC | Facility: CLINIC | Age: 77
End: 2022-06-02

## 2022-06-02 LAB — SARS-COV-2 RNA RESP QL NAA+PROBE: NOT DETECTED

## 2022-06-02 NOTE — TELEPHONE ENCOUNTER
Heydi Moore CALLED AND NEEDING AN ADDENDUM FOR PRE-OP APT 5/3.     PLEASE FAX TO  [facility]

## 2022-06-04 ENCOUNTER — HOSPITAL ENCOUNTER (OUTPATIENT)
Dept: GENERAL RADIOLOGY | Facility: HOSPITAL | Age: 77
Discharge: HOME | End: 2022-06-04
Attending: FAMILY MEDICINE
Payer: MEDICARE

## 2022-06-04 DIAGNOSIS — R13.10 SWALLOWING IMPAIRMENT: ICD-10-CM

## 2022-06-04 PROCEDURE — 92611 MOTION FLUOROSCOPY/SWALLOW: CPT

## 2022-06-04 PROCEDURE — 74230 X-RAY XM SWLNG FUNCJ C+: CPT | Performed by: FAMILY MEDICINE

## 2022-06-06 NOTE — TELEPHONE ENCOUNTER
Please update note on 5/3/22 to revise the surgery date of 6/8/22 and that pt is cleared for surgery on that date    Route back to RN

## 2022-06-15 ENCOUNTER — TELEPHONE (OUTPATIENT)
Dept: FAMILY MEDICINE CLINIC | Facility: CLINIC | Age: 77
End: 2022-06-15

## 2022-06-15 NOTE — TELEPHONE ENCOUNTER
So it might not hurt to take OTC omeprazole 20 mg maybe at HS and then lets just try some flonase?  2 puffs /per nostril in the AM

## 2022-06-15 NOTE — TELEPHONE ENCOUNTER
Daughter is calling- pt is clearing his throat frequently- but more so when he is eating. It is caught in his throat- pt does not have chest congestion    Daughter is wondering if she should be taking something for reflux or a possible decongestant? Daughter states it can be thicker at times- not sure what we can do to help but it is interfering with his swallowing post surgery.     Please advise

## 2022-07-17 DIAGNOSIS — E11.9 CONTROLLED TYPE 2 DIABETES MELLITUS WITHOUT COMPLICATION, WITHOUT LONG-TERM CURRENT USE OF INSULIN (HCC): Primary | ICD-10-CM

## 2022-07-18 RX ORDER — BLOOD SUGAR DIAGNOSTIC
1 STRIP MISCELLANEOUS 2 TIMES DAILY
Qty: 200 STRIP | Refills: 0 | Status: SHIPPED | OUTPATIENT
Start: 2022-07-18

## 2022-07-18 NOTE — TELEPHONE ENCOUNTER
Diabetic Supplies Protocol Passed 07/17/2022 08:44 PM    Appointment in the past 12 or next 3 months        LOV 1/13/22  Last refill 8/24/21 #200 0 refills

## 2022-07-21 ENCOUNTER — TELEPHONE (OUTPATIENT)
Dept: ORTHOPEDICS CLINIC | Facility: CLINIC | Age: 77
End: 2022-07-21

## 2022-07-22 NOTE — PAT NURSING NOTE
Patient stated he wants to reschedule instructed to call Dr. Celeste Jiang office; called and spoke with [facility] informed of the above.

## 2022-07-26 ENCOUNTER — ORDER TRANSCRIPTION (OUTPATIENT)
Dept: PHYSICAL THERAPY | Facility: HOSPITAL | Age: 77
End: 2022-07-26

## 2022-07-26 DIAGNOSIS — M17.12 ARTHRITIS OF LEFT KNEE: Primary | ICD-10-CM

## 2022-07-27 ENCOUNTER — TELEPHONE (OUTPATIENT)
Dept: PHYSICAL THERAPY | Facility: HOSPITAL | Age: 77
End: 2022-07-27

## 2022-08-04 ENCOUNTER — TELEPHONE (OUTPATIENT)
Dept: FAMILY MEDICINE CLINIC | Facility: CLINIC | Age: 77
End: 2022-08-04

## 2022-08-04 ENCOUNTER — NURSE ONLY (OUTPATIENT)
Dept: FAMILY MEDICINE CLINIC | Facility: CLINIC | Age: 77
End: 2022-08-04
Payer: MEDICARE

## 2022-08-04 ENCOUNTER — ORDER TRANSCRIPTION (OUTPATIENT)
Dept: PHYSICAL THERAPY | Facility: HOSPITAL | Age: 77
End: 2022-08-04

## 2022-08-04 DIAGNOSIS — R30.0 DYSURIA: ICD-10-CM

## 2022-08-04 DIAGNOSIS — M17.12 ARTHRITIS OF LEFT KNEE: Primary | ICD-10-CM

## 2022-08-04 DIAGNOSIS — R30.0 DYSURIA: Primary | ICD-10-CM

## 2022-08-04 PROCEDURE — 87186 SC STD MICRODIL/AGAR DIL: CPT | Performed by: FAMILY MEDICINE

## 2022-08-04 PROCEDURE — 87077 CULTURE AEROBIC IDENTIFY: CPT | Performed by: FAMILY MEDICINE

## 2022-08-04 PROCEDURE — 87086 URINE CULTURE/COLONY COUNT: CPT | Performed by: FAMILY MEDICINE

## 2022-08-04 NOTE — TELEPHONE ENCOUNTER
Daughter called and patient is having urinary discomfort    Daughter brought in urine sample to send for culture- per verb with Dr. Sidney Aguirre please order urine culutre to send off

## 2022-08-04 NOTE — PROGRESS NOTES
Melina Scott present in office for nurse visit. Confirmed clean catch  Urine sample obtained     All questions/concerns addressed. Patient left in stable condition.

## 2022-08-06 ENCOUNTER — TELEPHONE (OUTPATIENT)
Dept: FAMILY MEDICINE CLINIC | Facility: CLINIC | Age: 77
End: 2022-08-06

## 2022-08-06 NOTE — TELEPHONE ENCOUNTER
----- Message from Danette Landau, DO sent at 8/5/2022  8:08 PM CDT -----  Please notify his urine culture is negative so far

## 2022-08-07 ENCOUNTER — MOBILE ENCOUNTER (OUTPATIENT)
Dept: FAMILY MEDICINE CLINIC | Facility: CLINIC | Age: 77
End: 2022-08-07

## 2022-08-07 RX ORDER — CEFDINIR 300 MG/1
300 CAPSULE ORAL 2 TIMES DAILY
Qty: 20 CAPSULE | Refills: 0 | Status: SHIPPED | OUTPATIENT
Start: 2022-08-07 | End: 2022-08-17

## 2022-08-09 ENCOUNTER — MED REC SCAN ONLY (OUTPATIENT)
Dept: FAMILY MEDICINE CLINIC | Facility: CLINIC | Age: 77
End: 2022-08-09

## 2022-09-07 ENCOUNTER — OFFICE VISIT (OUTPATIENT)
Dept: FAMILY MEDICINE CLINIC | Facility: CLINIC | Age: 77
End: 2022-09-07
Payer: MEDICARE

## 2022-09-07 VITALS
SYSTOLIC BLOOD PRESSURE: 100 MMHG | TEMPERATURE: 98 F | HEART RATE: 58 BPM | WEIGHT: 208 LBS | DIASTOLIC BLOOD PRESSURE: 58 MMHG | OXYGEN SATURATION: 95 % | BODY MASS INDEX: 29 KG/M2

## 2022-09-07 DIAGNOSIS — Z12.5 PROSTATE CANCER SCREENING: ICD-10-CM

## 2022-09-07 DIAGNOSIS — J43.2 CENTRILOBULAR EMPHYSEMA (HCC): ICD-10-CM

## 2022-09-07 DIAGNOSIS — Z00.00 ENCOUNTER FOR MEDICARE ANNUAL WELLNESS EXAM: ICD-10-CM

## 2022-09-07 DIAGNOSIS — M54.42 CHRONIC MIDLINE LOW BACK PAIN WITH BILATERAL SCIATICA: ICD-10-CM

## 2022-09-07 DIAGNOSIS — M25.562 CHRONIC PAIN OF LEFT KNEE: ICD-10-CM

## 2022-09-07 DIAGNOSIS — E11.22 TYPE 2 DIABETES MELLITUS WITH CHRONIC KIDNEY DISEASE, WITHOUT LONG-TERM CURRENT USE OF INSULIN, UNSPECIFIED CKD STAGE (HCC): ICD-10-CM

## 2022-09-07 DIAGNOSIS — Z00.00 MEDICARE ANNUAL WELLNESS VISIT, SUBSEQUENT: Primary | ICD-10-CM

## 2022-09-07 DIAGNOSIS — E11.22 CKD STAGE 3 DUE TO TYPE 2 DIABETES MELLITUS (HCC): ICD-10-CM

## 2022-09-07 DIAGNOSIS — J41.0 SMOKERS' COUGH (HCC): ICD-10-CM

## 2022-09-07 DIAGNOSIS — G89.29 CHRONIC PAIN OF LEFT KNEE: ICD-10-CM

## 2022-09-07 DIAGNOSIS — R26.81 GAIT INSTABILITY: ICD-10-CM

## 2022-09-07 DIAGNOSIS — G20 PARKINSON'S DISEASE (HCC): ICD-10-CM

## 2022-09-07 DIAGNOSIS — R13.10 DYSPHAGIA, UNSPECIFIED TYPE: ICD-10-CM

## 2022-09-07 DIAGNOSIS — G89.29 CHRONIC MIDLINE LOW BACK PAIN WITH BILATERAL SCIATICA: ICD-10-CM

## 2022-09-07 DIAGNOSIS — Z13.1 SCREENING FOR DIABETES MELLITUS (DM): ICD-10-CM

## 2022-09-07 DIAGNOSIS — K76.0 HEPATIC STEATOSIS: ICD-10-CM

## 2022-09-07 DIAGNOSIS — R35.1 BENIGN PROSTATIC HYPERPLASIA WITH NOCTURIA: ICD-10-CM

## 2022-09-07 DIAGNOSIS — N21.0 BLADDER STONES: ICD-10-CM

## 2022-09-07 DIAGNOSIS — G25.81 RESTLESS LEGS: ICD-10-CM

## 2022-09-07 DIAGNOSIS — M54.41 CHRONIC MIDLINE LOW BACK PAIN WITH BILATERAL SCIATICA: ICD-10-CM

## 2022-09-07 DIAGNOSIS — N40.1 BENIGN PROSTATIC HYPERPLASIA WITH NOCTURIA: ICD-10-CM

## 2022-09-07 DIAGNOSIS — N18.30 CKD STAGE 3 DUE TO TYPE 2 DIABETES MELLITUS (HCC): ICD-10-CM

## 2022-09-07 DIAGNOSIS — Z13.6 SCREENING FOR CARDIOVASCULAR CONDITION: ICD-10-CM

## 2022-09-07 DIAGNOSIS — Z22.39 PSEUDOMONAS AERUGINOSA COLONIZATION: ICD-10-CM

## 2022-09-07 DIAGNOSIS — Z00.00 ENCOUNTER FOR ANNUAL HEALTH EXAMINATION: ICD-10-CM

## 2022-09-07 DIAGNOSIS — E78.2 MIXED HYPERLIPIDEMIA: ICD-10-CM

## 2022-09-07 DIAGNOSIS — E11.42 DIABETIC POLYNEUROPATHY ASSOCIATED WITH TYPE 2 DIABETES MELLITUS (HCC): ICD-10-CM

## 2022-09-07 DIAGNOSIS — Z23 NEED FOR VACCINATION: ICD-10-CM

## 2022-09-07 DIAGNOSIS — F33.9 DEPRESSION, RECURRENT (HCC): ICD-10-CM

## 2022-09-07 DIAGNOSIS — Q61.02 MULTIPLE RENAL CYSTS: ICD-10-CM

## 2022-09-07 DIAGNOSIS — E11.9 CONTROLLED TYPE 2 DIABETES MELLITUS WITHOUT COMPLICATION, WITHOUT LONG-TERM CURRENT USE OF INSULIN (HCC): ICD-10-CM

## 2022-09-07 PROBLEM — G56.22 CUBITAL TUNNEL SYNDROME, LEFT: Status: RESOLVED | Noted: 2022-01-03 | Resolved: 2022-09-07

## 2022-09-07 PROBLEM — N20.0 KIDNEY STONE: Status: RESOLVED | Noted: 2020-10-30 | Resolved: 2022-09-07

## 2022-09-07 PROBLEM — N12 PYELONEPHRITIS: Status: RESOLVED | Noted: 2021-02-18 | Resolved: 2022-09-07

## 2022-09-07 PROBLEM — M54.12 CERVICAL RADICULOPATHY: Status: RESOLVED | Noted: 2022-04-06 | Resolved: 2022-09-07

## 2022-09-07 PROCEDURE — 3078F DIAST BP <80 MM HG: CPT | Performed by: FAMILY MEDICINE

## 2022-09-07 PROCEDURE — 99397 PER PM REEVAL EST PAT 65+ YR: CPT | Performed by: FAMILY MEDICINE

## 2022-09-07 PROCEDURE — 96160 PT-FOCUSED HLTH RISK ASSMT: CPT | Performed by: FAMILY MEDICINE

## 2022-09-07 PROCEDURE — 90732 PPSV23 VACC 2 YRS+ SUBQ/IM: CPT | Performed by: FAMILY MEDICINE

## 2022-09-07 PROCEDURE — 1125F AMNT PAIN NOTED PAIN PRSNT: CPT | Performed by: FAMILY MEDICINE

## 2022-09-07 PROCEDURE — G0439 PPPS, SUBSEQ VISIT: HCPCS | Performed by: FAMILY MEDICINE

## 2022-09-07 PROCEDURE — 3074F SYST BP LT 130 MM HG: CPT | Performed by: FAMILY MEDICINE

## 2022-09-07 PROCEDURE — G0009 ADMIN PNEUMOCOCCAL VACCINE: HCPCS | Performed by: FAMILY MEDICINE

## 2022-09-07 RX ORDER — ALBUTEROL SULFATE 90 UG/1
2 AEROSOL, METERED RESPIRATORY (INHALATION) EVERY 4 HOURS PRN
COMMUNITY
Start: 2021-05-28

## 2022-09-13 ENCOUNTER — PATIENT MESSAGE (OUTPATIENT)
Dept: FAMILY MEDICINE CLINIC | Facility: CLINIC | Age: 77
End: 2022-09-13

## 2022-09-14 ENCOUNTER — TELEPHONE (OUTPATIENT)
Dept: FAMILY MEDICINE CLINIC | Facility: CLINIC | Age: 77
End: 2022-09-14

## 2022-09-14 NOTE — TELEPHONE ENCOUNTER
From: Awilda Shukla  To: Gregg Trevizo DO  Sent: 9/13/2022 9:24 PM CDT  Subject: Handicapped placard    This message is being sent by [employer] on behalf of Awilda Shukla. Good Morning Karen, can you please tell me what date Dr. Ed Perales signed dad's handicap placard. No hurry.  Thanks  [employer]

## 2022-09-14 NOTE — TELEPHONE ENCOUNTER
Rn spoke with daughter. She reports that dad went fishing this morning and when he was leaving [facility] he fell when closing the gate. The people who found him told daughter that he was confused when they found him    Daughter has him at home but pt is reporting that he can not walk or lift feet because they are heavy.     RN spoke with Dr. Adri Rodriguez and was advised that they need to call [phone] and take him b ambulance    Verbalized understanding

## 2022-09-21 ENCOUNTER — OFFICE VISIT (OUTPATIENT)
Dept: FAMILY MEDICINE CLINIC | Facility: CLINIC | Age: 77
End: 2022-09-21

## 2022-09-21 VITALS
OXYGEN SATURATION: 95 % | TEMPERATURE: 98 F | WEIGHT: 206.19 LBS | HEART RATE: 69 BPM | DIASTOLIC BLOOD PRESSURE: 60 MMHG | BODY MASS INDEX: 29 KG/M2 | SYSTOLIC BLOOD PRESSURE: 120 MMHG

## 2022-09-21 DIAGNOSIS — Z22.39 PSEUDOMONAS AERUGINOSA COLONIZATION: ICD-10-CM
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
## 2022-09-21 DIAGNOSIS — R26.81 GAIT INSTABILITY: ICD-10-CM

## 2022-09-21 DIAGNOSIS — J18.9 PNEUMONIA OF LEFT LOWER LOBE DUE TO INFECTIOUS ORGANISM: Primary | ICD-10-CM

## 2022-09-21 DIAGNOSIS — G20 PARKINSON'S DISEASE (HCC): ICD-10-CM

## 2022-09-21 PROBLEM — I10 HYPERTENSION: Status: ACTIVE | Noted: 2022-09-16
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
## 2022-09-21 PROCEDURE — 3074F SYST BP LT 130 MM HG: CPT | Performed by: FAMILY MEDICINE

## 2022-09-21 PROCEDURE — 99496 TRANSJ CARE MGMT HIGH F2F 7D: CPT | Performed by: FAMILY MEDICINE

## 2022-09-21 PROCEDURE — 1111F DSCHRG MED/CURRENT MED MERGE: CPT | Performed by: FAMILY MEDICINE

## 2022-09-21 PROCEDURE — 3078F DIAST BP <80 MM HG: CPT | Performed by: FAMILY MEDICINE

## 2022-09-21 RX ORDER — CEFUROXIME AXETIL 500 MG/1
500 TABLET ORAL 2 TIMES DAILY
COMMUNITY
Start: 2022-09-17 | End: 2022-09-24

## 2022-09-21 NOTE — TELEPHONE ENCOUNTER
The original prescription was discontinued on 9/19/2022 by Rakesh Foster RN for the following reason: Therapy completed. Renewing this prescription may not be appropriate.     Patient has OV today with Dr Kal Raymond   Date Time Provider Kris Beckman   9/21/2022 10:20 AM Meryl Amador [facility] [place]   9/26/2022 11:30 AM JOINT CLASS PREOP PT None   10/3/2022 11:40 AM Latrell Julien [facility] [place]   10/11/2022 10:45 AM OS COVID RESOURCE [facility] [place]   10/31/2022 10:30 AM Swathi Redding PA-C [facility]

## 2022-09-26 RX ORDER — GLIPIZIDE 5 MG/1
TABLET ORAL
Qty: 180 TABLET | Refills: 1 | OUTPATIENT
Start: 2022-09-26

## 2022-10-01 ENCOUNTER — MED REC SCAN ONLY (OUTPATIENT)
Dept: ORTHOPEDICS CLINIC | Facility: CLINIC | Age: 77
End: 2022-10-01

## 2022-10-03 ENCOUNTER — OFFICE VISIT (OUTPATIENT)
Dept: FAMILY MEDICINE CLINIC | Facility: CLINIC | Age: 77
End: 2022-10-03
Payer: MEDICARE

## 2022-10-03 VITALS
WEIGHT: 216.63 LBS | DIASTOLIC BLOOD PRESSURE: 70 MMHG | HEART RATE: 61 BPM | SYSTOLIC BLOOD PRESSURE: 124 MMHG | OXYGEN SATURATION: 93 % | TEMPERATURE: 98 F | HEIGHT: 71 IN | BODY MASS INDEX: 30.33 KG/M2

## 2022-10-03 DIAGNOSIS — M17.12 PRIMARY OSTEOARTHRITIS OF LEFT KNEE: ICD-10-CM

## 2022-10-03 DIAGNOSIS — K76.0 HEPATIC STEATOSIS: ICD-10-CM

## 2022-10-03 DIAGNOSIS — G89.29 CHRONIC PAIN OF LEFT KNEE: ICD-10-CM

## 2022-10-03 DIAGNOSIS — E11.22 CKD STAGE 3 DUE TO TYPE 2 DIABETES MELLITUS (HCC): ICD-10-CM

## 2022-10-03 DIAGNOSIS — G20 PARKINSON'S DISEASE (HCC): ICD-10-CM

## 2022-10-03 DIAGNOSIS — M25.562 CHRONIC PAIN OF LEFT KNEE: ICD-10-CM

## 2022-10-03 DIAGNOSIS — J43.2 CENTRILOBULAR EMPHYSEMA (HCC): ICD-10-CM

## 2022-10-03 DIAGNOSIS — G25.81 RESTLESS LEGS: ICD-10-CM

## 2022-10-03 DIAGNOSIS — M17.12 ARTHRITIS OF LEFT KNEE: ICD-10-CM

## 2022-10-03 DIAGNOSIS — N21.0 BLADDER STONES: ICD-10-CM

## 2022-10-03 DIAGNOSIS — Z00.00 ENCOUNTER FOR MEDICARE ANNUAL WELLNESS EXAM: ICD-10-CM

## 2022-10-03 DIAGNOSIS — Z01.818 PREOP EXAMINATION: Primary | ICD-10-CM

## 2022-10-03 DIAGNOSIS — N18.30 CKD STAGE 3 DUE TO TYPE 2 DIABETES MELLITUS (HCC): ICD-10-CM

## 2022-10-03 DIAGNOSIS — E11.22 TYPE 2 DIABETES MELLITUS WITH CHRONIC KIDNEY DISEASE, WITHOUT LONG-TERM CURRENT USE OF INSULIN, UNSPECIFIED CKD STAGE (HCC): ICD-10-CM

## 2022-10-03 DIAGNOSIS — Z00.00 ENCOUNTER FOR ANNUAL HEALTH EXAMINATION: ICD-10-CM

## 2022-10-03 DIAGNOSIS — E11.9 CONTROLLED TYPE 2 DIABETES MELLITUS WITHOUT COMPLICATION, WITHOUT LONG-TERM CURRENT USE OF INSULIN (HCC): ICD-10-CM

## 2022-10-03 DIAGNOSIS — Z13.1 SCREENING FOR DIABETES MELLITUS (DM): ICD-10-CM

## 2022-10-03 DIAGNOSIS — Q61.02 MULTIPLE RENAL CYSTS: ICD-10-CM

## 2022-10-03 LAB
ALBUMIN SERPL-MCNC: 3.4 G/DL (ref 3.4–5)
ALBUMIN/GLOB SERPL: 1 {RATIO} (ref 1–2)
ALP LIVER SERPL-CCNC: 111 U/L
ALT SERPL-CCNC: 49 U/L
ANION GAP SERPL CALC-SCNC: 4 MMOL/L (ref 0–18)
APTT PPP: 37.1 SECONDS (ref 23.3–35.6)
AST SERPL-CCNC: 27 U/L (ref 15–37)
BILIRUB SERPL-MCNC: 0.6 MG/DL (ref 0.1–2)
BUN BLD-MCNC: 21 MG/DL (ref 7–18)
CALCIUM BLD-MCNC: 9 MG/DL (ref 8.5–10.1)
CHLORIDE SERPL-SCNC: 105 MMOL/L (ref 98–112)
CO2 SERPL-SCNC: 28 MMOL/L (ref 21–32)
CREAT BLD-MCNC: 1.26 MG/DL
EST. AVERAGE GLUCOSE BLD GHB EST-MCNC: 137 MG/DL (ref 68–126)
FASTING STATUS PATIENT QL REPORTED: NO
GFR SERPLBLD BASED ON 1.73 SQ M-ARVRAT: 59 ML/MIN/1.73M2 (ref 60–?)
GLOBULIN PLAS-MCNC: 3.3 G/DL (ref 2.8–4.4)
GLUCOSE BLD-MCNC: 184 MG/DL (ref 70–99)
HBA1C MFR BLD: 6.4 % (ref ?–5.7)
INR BLD: 1.13 (ref 0.85–1.16)
OSMOLALITY SERPL CALC.SUM OF ELEC: 292 MOSM/KG (ref 275–295)
POTASSIUM SERPL-SCNC: 4.2 MMOL/L (ref 3.5–5.1)
PROT SERPL-MCNC: 6.7 G/DL (ref 6.4–8.2)
PROTHROMBIN TIME: 14.5 SECONDS (ref 11.6–14.8)
SODIUM SERPL-SCNC: 137 MMOL/L (ref 136–145)

## 2022-10-03 PROCEDURE — 87081 CULTURE SCREEN ONLY: CPT | Performed by: FAMILY MEDICINE

## 2022-10-03 PROCEDURE — 85610 PROTHROMBIN TIME: CPT | Performed by: ORTHOPAEDIC SURGERY

## 2022-10-03 PROCEDURE — 80053 COMPREHEN METABOLIC PANEL: CPT | Performed by: FAMILY MEDICINE

## 2022-10-03 PROCEDURE — 83036 HEMOGLOBIN GLYCOSYLATED A1C: CPT | Performed by: FAMILY MEDICINE

## 2022-10-03 PROCEDURE — 85730 THROMBOPLASTIN TIME PARTIAL: CPT | Performed by: ORTHOPAEDIC SURGERY

## 2022-10-03 RX ORDER — CITALOPRAM 20 MG/1
TABLET ORAL
Qty: 90 TABLET | Refills: 2 | Status: SHIPPED | OUTPATIENT
Start: 2022-10-03

## 2022-10-04 ENCOUNTER — ORDER TRANSCRIPTION (OUTPATIENT)
Dept: PHYSICAL THERAPY | Facility: HOSPITAL | Age: 77
End: 2022-10-04

## 2022-10-04 ENCOUNTER — TELEPHONE (OUTPATIENT)
Dept: PHYSICAL THERAPY | Facility: HOSPITAL | Age: 77
End: 2022-10-04

## 2022-10-04 DIAGNOSIS — M17.12 ARTHRITIS OF LEFT KNEE: Primary | ICD-10-CM

## 2022-10-07 ENCOUNTER — TELEPHONE (OUTPATIENT)
Dept: FAMILY MEDICINE CLINIC | Facility: CLINIC | Age: 77
End: 2022-10-07

## 2022-10-07 ENCOUNTER — NURSE ONLY (OUTPATIENT)
Dept: FAMILY MEDICINE CLINIC | Facility: CLINIC | Age: 77
End: 2022-10-07
Payer: MEDICARE

## 2022-10-07 DIAGNOSIS — R30.0 DYSURIA: ICD-10-CM

## 2022-10-07 DIAGNOSIS — R30.0 DYSURIA: Primary | ICD-10-CM

## 2022-10-07 PROCEDURE — 87086 URINE CULTURE/COLONY COUNT: CPT | Performed by: FAMILY MEDICINE

## 2022-10-07 PROCEDURE — 87077 CULTURE AEROBIC IDENTIFY: CPT | Performed by: FAMILY MEDICINE

## 2022-10-07 PROCEDURE — 87186 SC STD MICRODIL/AGAR DIL: CPT | Performed by: FAMILY MEDICINE

## 2022-10-07 RX ORDER — SULFAMETHOXAZOLE AND TRIMETHOPRIM 800; 160 MG/1; MG/1
1 TABLET ORAL 2 TIMES DAILY
Qty: 14 TABLET | Refills: 0 | Status: SHIPPED | OUTPATIENT
Start: 2022-10-07 | End: 2022-10-10

## 2022-10-07 NOTE — TELEPHONE ENCOUNTER
STILL HAVING URINARY SX'S. BS CONTINUES TO GO UP    CAN WE DO A URINE CULTURE?  SURGERY IS NEXT Friday 10/14       PLEASE ADV       THANK YOU

## 2022-10-10 ENCOUNTER — TELEPHONE (OUTPATIENT)
Dept: ORTHOPEDICS CLINIC | Facility: CLINIC | Age: 77
End: 2022-10-10

## 2022-10-10 ENCOUNTER — TELEPHONE (OUTPATIENT)
Dept: FAMILY MEDICINE CLINIC | Facility: CLINIC | Age: 77
End: 2022-10-10

## 2022-10-10 RX ORDER — LEVOFLOXACIN 500 MG/1
500 TABLET, FILM COATED ORAL DAILY
Qty: 5 TABLET | Refills: 0 | Status: SHIPPED | OUTPATIENT
Start: 2022-10-10 | End: 2022-10-15

## 2022-10-10 NOTE — TELEPHONE ENCOUNTER
Patient's daughter called and would like her DaD to do his PT at [facility] in [facility].  Fax number is [phone]

## 2022-10-10 NOTE — TELEPHONE ENCOUNTER
Patient daughter notified and verbalized understanding. Daughter state he is better and the blood sugar is coming down. States patient has upcoming surgery Friday. Patient currently on bactrim      Routed to DS to advise if change in antibiotic needed.    See also 10/7/22 tel enc

## 2022-10-10 NOTE — TELEPHONE ENCOUNTER
So Bactrim isn't even listed as part of the sensitivity panel, I sent in levaquin 500 mg once daily start it today and take the last dose Friday if he has time before surgery

## 2022-10-10 NOTE — TELEPHONE ENCOUNTER
----- Message from Bev Jiménez MD sent at 10/10/2022  7:26 AM CDT -----  This is Osiris's dad. Please let her daughter know that the urine cx did grow Pseudomonas. It is perhaps colonized in his urine. Is he feeling better since we placed him on the antibiotic? We could try him on Levaquin if needed.   Thanks

## 2022-10-10 NOTE — TELEPHONE ENCOUNTER
Patients daughter informed that outpatient PT order will be given at Post op appt. Daughter verbalized understanding.   Future Appointments   Date Time Provider Kris Beckman   10/11/2022  9:15 AM OS COVID RESOURCE [facility] [place]   10/11/2022 10:00 AM [facility] [place]   10/31/2022 10:30 AM Renetta Alex PA-C [facility]

## 2022-10-11 ENCOUNTER — LAB ENCOUNTER (OUTPATIENT)
Dept: LAB | Age: 77
End: 2022-10-11
Attending: ORTHOPAEDIC SURGERY
Payer: MEDICARE

## 2022-10-11 ENCOUNTER — IMMUNIZATION (OUTPATIENT)
Dept: FAMILY MEDICINE CLINIC | Facility: CLINIC | Age: 77
End: 2022-10-11
Payer: MEDICARE

## 2022-10-11 DIAGNOSIS — Z20.822 ENCOUNTER FOR PREPROCEDURE SCREENING LABORATORY TESTING FOR COVID-19: ICD-10-CM

## 2022-10-11 DIAGNOSIS — M17.12 ARTHRITIS OF LEFT KNEE: ICD-10-CM

## 2022-10-11 DIAGNOSIS — Z23 NEED FOR VACCINATION: Primary | ICD-10-CM

## 2022-10-11 DIAGNOSIS — Z01.812 ENCOUNTER FOR PREPROCEDURE SCREENING LABORATORY TESTING FOR COVID-19: ICD-10-CM

## 2022-10-11 LAB
ANTIBODY SCREEN: NEGATIVE
RH BLOOD TYPE: POSITIVE

## 2022-10-11 PROCEDURE — 90662 IIV NO PRSV INCREASED AG IM: CPT | Performed by: FAMILY MEDICINE

## 2022-10-11 PROCEDURE — 86901 BLOOD TYPING SEROLOGIC RH(D): CPT

## 2022-10-11 PROCEDURE — 86850 RBC ANTIBODY SCREEN: CPT

## 2022-10-11 PROCEDURE — 86900 BLOOD TYPING SEROLOGIC ABO: CPT

## 2022-10-11 PROCEDURE — G0008 ADMIN INFLUENZA VIRUS VAC: HCPCS | Performed by: FAMILY MEDICINE

## 2022-10-12 LAB — SARS-COV-2 RNA RESP QL NAA+PROBE: NOT DETECTED

## 2022-10-14 ENCOUNTER — ANESTHESIA EVENT (OUTPATIENT)
Dept: SURGERY | Facility: HOSPITAL | Age: 77
End: 2022-10-14
Payer: MEDICARE

## 2022-10-14 ENCOUNTER — ANESTHESIA (OUTPATIENT)
Dept: SURGERY | Facility: HOSPITAL | Age: 77
End: 2022-10-14
Payer: MEDICARE

## 2022-10-14 ENCOUNTER — APPOINTMENT (OUTPATIENT)
Dept: GENERAL RADIOLOGY | Facility: HOSPITAL | Age: 77
End: 2022-10-14
Attending: PHYSICIAN ASSISTANT
Payer: MEDICARE

## 2022-10-14 ENCOUNTER — HOSPITAL ENCOUNTER (INPATIENT)
Facility: HOSPITAL | Age: 77
LOS: 1 days | Discharge: HOME HEALTH | End: 2022-10-15
Attending: ORTHOPAEDIC SURGERY | Admitting: ORTHOPAEDIC SURGERY
Payer: MEDICARE

## 2022-10-14 DIAGNOSIS — Z20.822 ENCOUNTER FOR PREPROCEDURE SCREENING LABORATORY TESTING FOR COVID-19: ICD-10-CM

## 2022-10-14 DIAGNOSIS — Z01.812 ENCOUNTER FOR PREPROCEDURE SCREENING LABORATORY TESTING FOR COVID-19: ICD-10-CM

## 2022-10-14 DIAGNOSIS — M17.12 ARTHRITIS OF LEFT KNEE: Primary | ICD-10-CM

## 2022-10-14 DIAGNOSIS — Z96.652 STATUS POST LEFT KNEE REPLACEMENT: Primary | ICD-10-CM

## 2022-10-14 PROBLEM — E11.9 TYPE 2 DIABETES MELLITUS WITHOUT COMPLICATION, WITHOUT LONG-TERM CURRENT USE OF INSULIN (HCC): Status: ACTIVE | Noted: 2020-10-08

## 2022-10-14 LAB
ANTIBODY SCREEN: NEGATIVE
APTT PPP: 32.5 SECONDS (ref 23.3–35.6)
GLUCOSE BLD-MCNC: 150 MG/DL (ref 70–99)
GLUCOSE BLD-MCNC: 155 MG/DL (ref 70–99)
GLUCOSE BLD-MCNC: 193 MG/DL (ref 70–99)
GLUCOSE BLD-MCNC: 246 MG/DL (ref 70–99)
RH BLOOD TYPE: POSITIVE

## 2022-10-14 PROCEDURE — 3E0T33Z INTRODUCTION OF ANTI-INFLAMMATORY INTO PERIPHERAL NERVES AND PLEXI, PERCUTANEOUS APPROACH: ICD-10-PCS | Performed by: ANESTHESIOLOGY

## 2022-10-14 PROCEDURE — 27447 TOTAL KNEE ARTHROPLASTY: CPT | Performed by: ORTHOPAEDIC SURGERY

## 2022-10-14 PROCEDURE — 99222 1ST HOSP IP/OBS MODERATE 55: CPT | Performed by: STUDENT IN AN ORGANIZED HEALTH CARE EDUCATION/TRAINING PROGRAM

## 2022-10-14 PROCEDURE — 73560 X-RAY EXAM OF KNEE 1 OR 2: CPT | Performed by: PHYSICIAN ASSISTANT

## 2022-10-14 PROCEDURE — 27447 TOTAL KNEE ARTHROPLASTY: CPT | Performed by: PHYSICIAN ASSISTANT

## 2022-10-14 PROCEDURE — 0SRD0J9 REPLACEMENT OF LEFT KNEE JOINT WITH SYNTHETIC SUBSTITUTE, CEMENTED, OPEN APPROACH: ICD-10-PCS | Performed by: ORTHOPAEDIC SURGERY

## 2022-10-14 PROCEDURE — 76942 ECHO GUIDE FOR BIOPSY: CPT | Performed by: ANESTHESIOLOGY

## 2022-10-14 PROCEDURE — 3E0T3BZ INTRODUCTION OF ANESTHETIC AGENT INTO PERIPHERAL NERVES AND PLEXI, PERCUTANEOUS APPROACH: ICD-10-PCS | Performed by: ANESTHESIOLOGY

## 2022-10-14 DEVICE — RESURFACING PATELLA SIZE 3
Type: IMPLANTABLE DEVICE | Site: KNEE | Status: FUNCTIONAL
Brand: GMK PRIMARY TOTAL KNEE SYSTEM

## 2022-10-14 DEVICE — FIXED TIBIAL TRAY CEMENTED LEFT, SIZE 5
Type: IMPLANTABLE DEVICE | Site: KNEE | Status: FUNCTIONAL
Brand: GMK PRIMARY TOTAL KNEE SYSTEM

## 2022-10-14 DEVICE — TIBIAL INSERT FIXED SPHERE FLEX #5/12 MM L
Type: IMPLANTABLE DEVICE | Site: KNEE | Status: FUNCTIONAL
Brand: GMK SPHERE TOTAL KNEE SYSTEM

## 2022-10-14 DEVICE — FEMUR SPHERE CEMENTED LEFT, SIZE 5+
Type: IMPLANTABLE DEVICE | Site: KNEE | Status: FUNCTIONAL
Brand: GMK SPHERE TOTAL KNEE SYSTEM

## 2022-10-14 DEVICE — IMPLANTABLE DEVICE
Type: IMPLANTABLE DEVICE | Site: KNEE | Status: FUNCTIONAL
Brand: REFOBACIN® BONE CEMENT R

## 2022-10-14 RX ORDER — OXYCODONE HYDROCHLORIDE 5 MG/1
2.5 TABLET ORAL EVERY 4 HOURS PRN
Status: DISCONTINUED | OUTPATIENT
Start: 2022-10-14 | End: 2022-10-15

## 2022-10-14 RX ORDER — ACETAMINOPHEN 500 MG
1000 TABLET ORAL ONCE
Status: DISCONTINUED | OUTPATIENT
Start: 2022-10-14 | End: 2022-10-14 | Stop reason: HOSPADM

## 2022-10-14 RX ORDER — CEFAZOLIN SODIUM/WATER 2 G/20 ML
2 SYRINGE (ML) INTRAVENOUS ONCE
Status: COMPLETED | OUTPATIENT
Start: 2022-10-14 | End: 2022-10-14

## 2022-10-14 RX ORDER — SENNOSIDES 8.6 MG
17.2 TABLET ORAL NIGHTLY
Status: DISCONTINUED | OUTPATIENT
Start: 2022-10-14 | End: 2022-10-15

## 2022-10-14 RX ORDER — NICOTINE POLACRILEX 4 MG
30 LOZENGE BUCCAL
Status: DISCONTINUED | OUTPATIENT
Start: 2022-10-14 | End: 2022-10-14 | Stop reason: HOSPADM

## 2022-10-14 RX ORDER — FLUTICASONE FUROATE AND VILANTEROL 200; 25 UG/1; UG/1
1 POWDER RESPIRATORY (INHALATION) DAILY
Status: DISCONTINUED | OUTPATIENT
Start: 2022-10-14 | End: 2022-10-15

## 2022-10-14 RX ORDER — HYDROMORPHONE HYDROCHLORIDE 1 MG/ML
0.2 INJECTION, SOLUTION INTRAMUSCULAR; INTRAVENOUS; SUBCUTANEOUS EVERY 2 HOUR PRN
Status: DISCONTINUED | OUTPATIENT
Start: 2022-10-14 | End: 2022-10-15

## 2022-10-14 RX ORDER — SODIUM CHLORIDE, SODIUM LACTATE, POTASSIUM CHLORIDE, CALCIUM CHLORIDE 600; 310; 30; 20 MG/100ML; MG/100ML; MG/100ML; MG/100ML
INJECTION, SOLUTION INTRAVENOUS CONTINUOUS
Status: DISCONTINUED | OUTPATIENT
Start: 2022-10-14 | End: 2022-10-15

## 2022-10-14 RX ORDER — SODIUM PHOSPHATE, DIBASIC AND SODIUM PHOSPHATE, MONOBASIC 7; 19 G/133ML; G/133ML
1 ENEMA RECTAL ONCE AS NEEDED
Status: DISCONTINUED | OUTPATIENT
Start: 2022-10-14 | End: 2022-10-15

## 2022-10-14 RX ORDER — NICOTINE POLACRILEX 4 MG
30 LOZENGE BUCCAL
Status: DISCONTINUED | OUTPATIENT
Start: 2022-10-14 | End: 2022-10-15

## 2022-10-14 RX ORDER — METOCLOPRAMIDE HYDROCHLORIDE 5 MG/ML
10 INJECTION INTRAMUSCULAR; INTRAVENOUS EVERY 8 HOURS PRN
Status: DISCONTINUED | OUTPATIENT
Start: 2022-10-14 | End: 2022-10-15

## 2022-10-14 RX ORDER — CEPHALEXIN 500 MG/1
500 CAPSULE ORAL EVERY 8 HOURS SCHEDULED
Status: DISCONTINUED | OUTPATIENT
Start: 2022-10-15 | End: 2022-10-15

## 2022-10-14 RX ORDER — INSULIN ASPART 100 [IU]/ML
INJECTION, SOLUTION INTRAVENOUS; SUBCUTANEOUS ONCE
Status: COMPLETED | OUTPATIENT
Start: 2022-10-14 | End: 2022-10-14

## 2022-10-14 RX ORDER — HYDROMORPHONE HYDROCHLORIDE 1 MG/ML
0.6 INJECTION, SOLUTION INTRAMUSCULAR; INTRAVENOUS; SUBCUTANEOUS EVERY 5 MIN PRN
Status: DISCONTINUED | OUTPATIENT
Start: 2022-10-14 | End: 2022-10-14 | Stop reason: HOSPADM

## 2022-10-14 RX ORDER — CITALOPRAM 20 MG/1
20 TABLET ORAL DAILY
COMMUNITY

## 2022-10-14 RX ORDER — TRAMADOL HYDROCHLORIDE 50 MG/1
50 TABLET ORAL EVERY 12 HOURS SCHEDULED
Status: DISCONTINUED | OUTPATIENT
Start: 2022-10-14 | End: 2022-10-15

## 2022-10-14 RX ORDER — ASPIRIN 325 MG
325 TABLET, DELAYED RELEASE (ENTERIC COATED) ORAL 2 TIMES DAILY
Status: DISCONTINUED | OUTPATIENT
Start: 2022-10-14 | End: 2022-10-15

## 2022-10-14 RX ORDER — FINASTERIDE 5 MG/1
5 TABLET, FILM COATED ORAL DAILY
COMMUNITY

## 2022-10-14 RX ORDER — MELATONIN
325
Status: DISCONTINUED | OUTPATIENT
Start: 2022-10-14 | End: 2022-10-15

## 2022-10-14 RX ORDER — ESCITALOPRAM OXALATE 10 MG/1
10 TABLET ORAL DAILY
Status: DISCONTINUED | OUTPATIENT
Start: 2022-10-15 | End: 2022-10-15

## 2022-10-14 RX ORDER — HYDROMORPHONE HYDROCHLORIDE 1 MG/ML
0.2 INJECTION, SOLUTION INTRAMUSCULAR; INTRAVENOUS; SUBCUTANEOUS EVERY 5 MIN PRN
Status: DISCONTINUED | OUTPATIENT
Start: 2022-10-14 | End: 2022-10-14 | Stop reason: HOSPADM

## 2022-10-14 RX ORDER — ATORVASTATIN CALCIUM 40 MG/1
40 TABLET, FILM COATED ORAL DAILY
Status: DISCONTINUED | OUTPATIENT
Start: 2022-10-14 | End: 2022-10-15

## 2022-10-14 RX ORDER — DEXTROSE MONOHYDRATE 25 G/50ML
50 INJECTION, SOLUTION INTRAVENOUS
Status: DISCONTINUED | OUTPATIENT
Start: 2022-10-14 | End: 2022-10-14 | Stop reason: HOSPADM

## 2022-10-14 RX ORDER — CLONAZEPAM 0.5 MG/1
0.5 TABLET ORAL NIGHTLY
Status: DISCONTINUED | OUTPATIENT
Start: 2022-10-14 | End: 2022-10-15

## 2022-10-14 RX ORDER — BUPIVACAINE HYDROCHLORIDE 7.5 MG/ML
INJECTION, SOLUTION INTRASPINAL AS NEEDED
Status: DISCONTINUED | OUTPATIENT
Start: 2022-10-14 | End: 2022-10-14 | Stop reason: SURG

## 2022-10-14 RX ORDER — ROPIVACAINE HYDROCHLORIDE 5 MG/ML
INJECTION, SOLUTION EPIDURAL; INFILTRATION; PERINEURAL AS NEEDED
Status: DISCONTINUED | OUTPATIENT
Start: 2022-10-14 | End: 2022-10-14 | Stop reason: SURG

## 2022-10-14 RX ORDER — NICOTINE POLACRILEX 4 MG
15 LOZENGE BUCCAL
Status: DISCONTINUED | OUTPATIENT
Start: 2022-10-14 | End: 2022-10-15

## 2022-10-14 RX ORDER — DOCUSATE SODIUM 100 MG/1
100 CAPSULE, LIQUID FILLED ORAL 2 TIMES DAILY
Status: DISCONTINUED | OUTPATIENT
Start: 2022-10-14 | End: 2022-10-15

## 2022-10-14 RX ORDER — DEXAMETHASONE SODIUM PHOSPHATE 4 MG/ML
VIAL (ML) INJECTION AS NEEDED
Status: DISCONTINUED | OUTPATIENT
Start: 2022-10-14 | End: 2022-10-14 | Stop reason: SURG

## 2022-10-14 RX ORDER — MIDAZOLAM HYDROCHLORIDE 1 MG/ML
INJECTION INTRAMUSCULAR; INTRAVENOUS AS NEEDED
Status: DISCONTINUED | OUTPATIENT
Start: 2022-10-14 | End: 2022-10-14 | Stop reason: SURG

## 2022-10-14 RX ORDER — FINASTERIDE 5 MG/1
5 TABLET, FILM COATED ORAL DAILY
Status: DISCONTINUED | OUTPATIENT
Start: 2022-10-14 | End: 2022-10-15

## 2022-10-14 RX ORDER — NALOXONE HYDROCHLORIDE 0.4 MG/ML
80 INJECTION, SOLUTION INTRAMUSCULAR; INTRAVENOUS; SUBCUTANEOUS AS NEEDED
Status: DISCONTINUED | OUTPATIENT
Start: 2022-10-14 | End: 2022-10-14 | Stop reason: HOSPADM

## 2022-10-14 RX ORDER — HYDRALAZINE HYDROCHLORIDE 20 MG/ML
5 INJECTION INTRAMUSCULAR; INTRAVENOUS EVERY 6 HOURS PRN
Status: DISCONTINUED | OUTPATIENT
Start: 2022-10-14 | End: 2022-10-15

## 2022-10-14 RX ORDER — ACETAMINOPHEN 325 MG/1
650 TABLET ORAL ONCE
Status: COMPLETED | OUTPATIENT
Start: 2022-10-14 | End: 2022-10-14

## 2022-10-14 RX ORDER — OXYCODONE HYDROCHLORIDE 5 MG/1
5 TABLET ORAL EVERY 4 HOURS PRN
Status: DISCONTINUED | OUTPATIENT
Start: 2022-10-14 | End: 2022-10-15

## 2022-10-14 RX ORDER — TRANEXAMIC ACID 10 MG/ML
1000 INJECTION, SOLUTION INTRAVENOUS ONCE
Status: COMPLETED | OUTPATIENT
Start: 2022-10-14 | End: 2022-10-14

## 2022-10-14 RX ORDER — POLYETHYLENE GLYCOL 3350 17 G/17G
17 POWDER, FOR SOLUTION ORAL DAILY PRN
Status: DISCONTINUED | OUTPATIENT
Start: 2022-10-14 | End: 2022-10-15

## 2022-10-14 RX ORDER — DEXTROSE MONOHYDRATE 25 G/50ML
50 INJECTION, SOLUTION INTRAVENOUS
Status: DISCONTINUED | OUTPATIENT
Start: 2022-10-14 | End: 2022-10-15

## 2022-10-14 RX ORDER — ONDANSETRON 2 MG/ML
4 INJECTION INTRAMUSCULAR; INTRAVENOUS EVERY 6 HOURS PRN
Status: DISCONTINUED | OUTPATIENT
Start: 2022-10-14 | End: 2022-10-15

## 2022-10-14 RX ORDER — EPHEDRINE SULFATE 50 MG/ML
INJECTION INTRAVENOUS AS NEEDED
Status: DISCONTINUED | OUTPATIENT
Start: 2022-10-14 | End: 2022-10-14 | Stop reason: SURG

## 2022-10-14 RX ORDER — DIPHENHYDRAMINE HCL 25 MG
25 CAPSULE ORAL EVERY 4 HOURS PRN
Status: DISCONTINUED | OUTPATIENT
Start: 2022-10-14 | End: 2022-10-15

## 2022-10-14 RX ORDER — DEXAMETHASONE SODIUM PHOSPHATE 10 MG/ML
8 INJECTION, SOLUTION INTRAMUSCULAR; INTRAVENOUS ONCE
Status: COMPLETED | OUTPATIENT
Start: 2022-10-15 | End: 2022-10-15

## 2022-10-14 RX ORDER — BISACODYL 10 MG
10 SUPPOSITORY, RECTAL RECTAL
Status: DISCONTINUED | OUTPATIENT
Start: 2022-10-14 | End: 2022-10-15

## 2022-10-14 RX ORDER — DIPHENHYDRAMINE HYDROCHLORIDE 50 MG/ML
25 INJECTION INTRAMUSCULAR; INTRAVENOUS ONCE AS NEEDED
Status: ACTIVE | OUTPATIENT
Start: 2022-10-14 | End: 2022-10-14

## 2022-10-14 RX ORDER — SODIUM CHLORIDE, SODIUM LACTATE, POTASSIUM CHLORIDE, CALCIUM CHLORIDE 600; 310; 30; 20 MG/100ML; MG/100ML; MG/100ML; MG/100ML
INJECTION, SOLUTION INTRAVENOUS CONTINUOUS
Status: DISCONTINUED | OUTPATIENT
Start: 2022-10-14 | End: 2022-10-14 | Stop reason: HOSPADM

## 2022-10-14 RX ORDER — DIPHENHYDRAMINE HYDROCHLORIDE 50 MG/ML
12.5 INJECTION INTRAMUSCULAR; INTRAVENOUS EVERY 4 HOURS PRN
Status: DISCONTINUED | OUTPATIENT
Start: 2022-10-14 | End: 2022-10-15

## 2022-10-14 RX ORDER — TAMSULOSIN HYDROCHLORIDE 0.4 MG/1
0.4 CAPSULE ORAL EVERY EVENING
Status: DISCONTINUED | OUTPATIENT
Start: 2022-10-14 | End: 2022-10-15

## 2022-10-14 RX ORDER — ACETAMINOPHEN 325 MG/1
TABLET ORAL
Status: COMPLETED
Start: 2022-10-14 | End: 2022-10-14

## 2022-10-14 RX ORDER — NICOTINE POLACRILEX 4 MG
15 LOZENGE BUCCAL
Status: DISCONTINUED | OUTPATIENT
Start: 2022-10-14 | End: 2022-10-14 | Stop reason: HOSPADM

## 2022-10-14 RX ORDER — HYDROMORPHONE HYDROCHLORIDE 1 MG/ML
0.4 INJECTION, SOLUTION INTRAMUSCULAR; INTRAVENOUS; SUBCUTANEOUS EVERY 5 MIN PRN
Status: DISCONTINUED | OUTPATIENT
Start: 2022-10-14 | End: 2022-10-14 | Stop reason: HOSPADM

## 2022-10-14 RX ORDER — CEFAZOLIN SODIUM/WATER 2 G/20 ML
2 SYRINGE (ML) INTRAVENOUS EVERY 8 HOURS
Status: COMPLETED | OUTPATIENT
Start: 2022-10-14 | End: 2022-10-14

## 2022-10-14 RX ORDER — INSULIN ASPART 100 [IU]/ML
INJECTION, SOLUTION INTRAVENOUS; SUBCUTANEOUS
Status: COMPLETED
Start: 2022-10-14 | End: 2022-10-14

## 2022-10-14 RX ORDER — ALBUTEROL SULFATE 90 UG/1
2 AEROSOL, METERED RESPIRATORY (INHALATION) EVERY 4 HOURS PRN
Status: DISCONTINUED | OUTPATIENT
Start: 2022-10-14 | End: 2022-10-15

## 2022-10-14 RX ORDER — ACETAMINOPHEN 325 MG/1
650 TABLET ORAL 4 TIMES DAILY
Status: DISCONTINUED | OUTPATIENT
Start: 2022-10-14 | End: 2022-10-15

## 2022-10-14 RX ADMIN — TRANEXAMIC ACID 1000 MG: 10 INJECTION, SOLUTION INTRAVENOUS at 07:20:00

## 2022-10-14 RX ADMIN — ROPIVACAINE HYDROCHLORIDE 20 ML: 5 INJECTION, SOLUTION EPIDURAL; INFILTRATION; PERINEURAL at 07:17:00

## 2022-10-14 RX ADMIN — EPHEDRINE SULFATE 5 MG: 50 INJECTION INTRAVENOUS at 07:25:00

## 2022-10-14 RX ADMIN — SODIUM CHLORIDE, SODIUM LACTATE, POTASSIUM CHLORIDE, CALCIUM CHLORIDE: 600; 310; 30; 20 INJECTION, SOLUTION INTRAVENOUS at 07:10:00

## 2022-10-14 RX ADMIN — DEXAMETHASONE SODIUM PHOSPHATE 2 MG: 4 MG/ML VIAL (ML) INJECTION at 07:17:00

## 2022-10-14 RX ADMIN — BUPIVACAINE HYDROCHLORIDE 1.5 ML: 7.5 INJECTION, SOLUTION INTRASPINAL at 07:15:00

## 2022-10-14 RX ADMIN — MIDAZOLAM HYDROCHLORIDE 2 MG: 1 INJECTION INTRAMUSCULAR; INTRAVENOUS at 07:10:00

## 2022-10-14 RX ADMIN — SODIUM CHLORIDE, SODIUM LACTATE, POTASSIUM CHLORIDE, CALCIUM CHLORIDE: 600; 310; 30; 20 INJECTION, SOLUTION INTRAVENOUS at 09:51:00

## 2022-10-14 RX ADMIN — CEFAZOLIN SODIUM/WATER 2 G: 2 G/20 ML SYRINGE (ML) INTRAVENOUS at 07:20:00

## 2022-10-14 NOTE — INTERVAL H&P NOTE
Pre-op Diagnosis: Arthritis of left knee [M17.12]    The above referenced H&P was reviewed by Fahad Saldaña MD on 10/14/2022, the patient was examined and no significant changes have occurred in the patient's condition since the H&P was performed. I discussed with the patient and/or legal representative the potential benefits, risks and side effects of this procedure; the likelihood of the patient achieving goals; and potential problems that might occur during recuperation. I discussed reasonable alternatives to the procedure, including risks, benefits and side effects related to the alternatives and risks related to not receiving this procedure. We will proceed with procedure as planned.

## 2022-10-14 NOTE — OPERATIVE REPORT
Operative Note    Patient Name/: Anna Covert   Date: 10/14/2022  Location: [facility]  Preoperative Diagnosis: Left knee osteoarthritis  Postoperative Diagnosis: Same  Operation: Left total knee arthroplasty mechanical alignment  Primary Surgeon: Calin Sullivan  Assistant: YVONNE Billings first assist.  Jomar William surgical assistant also assisted in this case. Please note a skilled surgical assistant was necessary for this case in order to assist with patient positioning, prepping, draping, incision, exposure, implant placement, wound closure. Indications: 80-year-old male with end-stage left knee osteoarthritis failed nonsurgical treatment  Surgical Findings: End-stage varus osteoarthritis  Operative Report: After informed consent, the left lower extremity was marked. Patient was brought to the operating room where spinal anesthesia was induced. Preoperative exam demonstrated range of motion from 5 to 10 degrees short of full extension, flexion to approximately 120-1 30. The left lower extremity was prepped and draped in sterile fashion. Timeout was performed to verify correct surgical site and procedure. 2 g of Ancef was given within 1 hour of incision. Additionally, 1 g tranexamic acid was given intravenously. Next the limb was gravity exsanguinated and tourniquet inflated. Incision was made anteriorly from 2 fingerbreadths proximal to the patella down along the medial aspect of the tibial tubercle. This was carried down to the extensor mechanism. Medial and lateral flaps were developed. The VMO muscle belly was identified. Full-thickness medial parapatellar arthrotomy was made from 2 fingerbreadths proximal to the patella along the VMO muscle belly, around the medial patella and patellar tendon. The fat pad was dissected free from the patellar tendon and reflected medially.   Subperiosteal dissection was carried out posteriorly about the proximal medial aspect of the tibia. Osteophytes were removed from the proximal medial tibia. The lateral patellofemoral plica was incised, and the patella was everted and denervated. The synovium over the anterior distal femur was teed open. Next the knee was flexed up, and remnant of the ACL and lateral meniscus were excised. Whitesides line was marked on the trochlear groove. I used an intramedullary reamer to open up the femur for our intramedullary alignment guide. The distal femoral cutting block was placed on the distal femur to take off 10 mm of bone. It sat down on the distal medial femur, measuring to take off more medial bone than lateral.  This was pinned in place. A carlos's wing was used to check the cut, and the cut was performed. The distal medial femur fragment measured 8 mm. Next the knee was flexed and the tibial crest was marked. An extra medullary tibial cutting guide was placed above the ankle, measuring to take off a few millimeters off the medial side, pinned in line with the tibial crest in the coronal plane and with a few degrees of posterior slope in the sagittal plane. The proximal tibia was cut, using a Z retractor to protect MCL, patellar tendon and lateral soft tissues. After this portion of bone was removed, the knee was brought into extension and a lamina  was used to open up the extension space. What remained of the medial and lateral menisci were removed. Spacer block was placed with a T-handle drop-down benedict to verify appropriate cut angle. Next the knee was flexed up, and the femoral sizing block with stylus was used. This sized for a size 5+ femur. Pins were placed, and the sizing block removed. Pins were noted to be parallel to the epicondylar axis and perpendicular to [employer]. The 4-in-1 cutting block was placed and screwed down. Anterior distal femur was cut, demonstrating a positive grand piano sign.   Posterior condyles were cut, measuring 9 on the posterior medial cut.  Chamfers were then cut. The cutting block was removed, and the knee was flexed with a lamina  to open up the posterior knee joint. Notch contents and PCL were removed. Posterior condylar recesses were opened, and posterior osteophytes chiseled out. The knee was noted to be a touch tight on the medial side. Additionally it was tight in extension on the medial side with the spacer block in place. I therefore performed a medial release. Release the semimembranosus off the proximal medial and posterior tibia as well as osteophyte removal.  Following this, the knee was brought into extension, and sized for a tibial baseplate. Trials were then placed. With the 5+ femur, the 5 tibia, and a 12 polyethylene liner, the knee came to full extension with excellent varus valgus stability. In mid flexion, there was 3-4 mm varus laxity, less than 1 mm valgus laxity, Lachman's less than 5 mm rotating around medial femoral condyle. At 90 degrees, anterior drawer was 5 mm slightly rotating/slightly translating around the medial side. Gravity flexion was 130 with the patella tracking centrally. The tibial component rotation was marked, and the tibial trial removed. The femoral lugs were drilled, and the anterior chamfer finishing guide was placed to cut the finishing anterior chamfer cut. The knee was then flexed, and the tibia brought forward to place the tibial baseplate trial.  This was pinned in place, and the drill and keel punch were used to prepare the proximal tibia. The knee was then brought into extension, the patella everted, and it was freehand cut from a thickness of 26 mm and cut to 16 mm. Next final components were opened, bony surfaces were washed and dried, and periarticular pain cocktail was injected with 30 mL in the posterior capsule, 10 mL into lateral femoral and tibial periosteum, 10 mL into medial femoral and tibial periosteum, and 10 mL into extensor mechanism.   Cement was then mixed.  The tibial component was cemented along with the proximal tibial bone surface, the component was impacted into place followed by removal of excess cement. The femur was reduced onto the tibia, and cement was placed on the cut surface of the femur. The cemented femoral component was then impacted down, with removal of excess cement. The knee was brought into full extension and an axial load was held across the joint. The patella was cemented along with the button, and this was compressed and held in place. Once cement had hardened, the knee was trialed again. Optimal stability was achieved with a 12 mm polyethylene liner, with exam demonstrating same as with trials. The trial poly was removed, the knee was washed out, any loose bodies removed, and final poly was placed. Betadine lavage was performed. Closure was performed with 5 Ethibond for the arthrotomy, 2-0 Vicryl for skin, followed by staples. A sterile dressing was applied, and the patient was brought to PACU in good condition.   Anesthesia: Spinal plus abductor canal block  Complications: None  Specimens: None  Blood loss: 25 mL  Fluids: See anesthesia report  Implants: Medacta GM K sphere size 5+ femur, 5 tibia, 12 poly-, 3 patellar button  Drains: None  Condition: Good  Disposition: Floor    -Weightbearing as tolerated, PT OT  -DVT prophylaxis mechanical plus aspirin twice daily  -Pain control with oral meds  -Perioperative Ancef, 1 week Keflex postop due to diabetes and chronic kidney disease    Fernando Vallejo MD, [address] [facility]  Phone [phone]  Fax [phone]

## 2022-10-14 NOTE — PLAN OF CARE
Denies pain at this time. Reports some numbness to BLE. Aquacel dressing clean/dry/intact to left knee. Gel ice and spandagrip to LLE. Vitals stable on room air. Due to void. SCD's on bilaterally. Fall precautions in place. PT/OT to see. Plan of care discussed with patient and family at bedside.

## 2022-10-14 NOTE — ANESTHESIA PROCEDURE NOTES
Spinal Block  Performed by: Janet Morgan MD  Authorized by: Janet Morgan MD       General Information and Staff    Start Time:   Anesthesiologist: Janet Morgan MD  Performed by: Anesthesiologist  Site identification: surface landmarks    Preanesthetic Checklist: patient identified, IV checked, risks and benefits discussed, monitors and equipment checked, pre-op evaluation, timeout performed, anesthesia consent and sterile technique used      Procedure Details    Patient Position:  Sitting  Prep: ChloraPrep    Monitoring:  Cardiac monitor, heart rate and continuous pulse ox  Approach:  Midline  Location:  L3-4  Injection Technique:  Single-shot    Needle    Needle Type:  Sprotte  Needle Gauge:  24 G  Needle Length:  3.5 in    Assessment    Sensory Level:  T10  Events: clear CSF, CSF aspirated, well tolerated and blood negative      Additional Comments     First attempt successful. No resistance, pain or parasthesias on injection. Aspiration of clear CSF both at start of injection and at end.

## 2022-10-14 NOTE — ANESTHESIA PROCEDURE NOTES
Regional Block  Performed by: Therese Mckeon MD  Authorized by: Therese Mckeon MD       General Information and Staff    Start Time:   Anesthesiologist: Therese Mckeon MD  Performed by: Anesthesiologist  Patient Location:  OR    Block Placement: Pre Induction  Site Identification: real time ultrasound guided and image stored and retrievable    Block site/laterality marked before start: site marked  Reason for Block: at surgeon's request and post-op pain management    Preanesthetic Checklist: 2 patient identifers, IV checked, risks and benefits discussed, monitors and equipment checked, pre-op evaluation, timeout performed, anesthesia consent, sterile technique used, no prohibitive neurological deficits and no local skin infection at insertion site      Procedure Details    Patient Position:  Supine  Prep: ChloraPrep    Monitoring:  Cardiac monitor, continuous pulse ox and blood pressure cuff  Block Type: Adductor canal  Laterality:  Left  Injection Technique:  Single-shot    Needle    Needle Type:  Short-bevel and echogenic  Needle Gauge:  21 G  Needle Length:  100 mm  Needle Localization:  Ultrasound guidance  Reason for Ultrasound Use: appropriate spread of the medication was noted in real time and no ultrasound evidence of intravascular and/or intraneural injection            Assessment    Injection Assessment:  Good spread noted, negative resistance, negative aspiration for heme, incremental injection, low pressure, local visualized surrounding nerve on ultrasound and no pain on injection  Heart Rate Change: No    - Patient tolerated block procedure well without evidence of immediate block related complications.      Medications      Additional Comments    0.5% ropivacaine with 2mg decadron (20cc total)

## 2022-10-15 VITALS
HEIGHT: 71 IN | DIASTOLIC BLOOD PRESSURE: 61 MMHG | BODY MASS INDEX: 29.54 KG/M2 | SYSTOLIC BLOOD PRESSURE: 129 MMHG | OXYGEN SATURATION: 90 % | HEART RATE: 53 BPM | RESPIRATION RATE: 18 BRPM | TEMPERATURE: 98 F | WEIGHT: 211 LBS

## 2022-10-15 LAB — GLUCOSE BLD-MCNC: 235 MG/DL (ref 70–99)

## 2022-10-15 PROCEDURE — 99024 POSTOP FOLLOW-UP VISIT: CPT | Performed by: ORTHOPAEDIC SURGERY

## 2022-10-15 PROCEDURE — 99233 SBSQ HOSP IP/OBS HIGH 50: CPT | Performed by: STUDENT IN AN ORGANIZED HEALTH CARE EDUCATION/TRAINING PROGRAM

## 2022-10-15 RX ORDER — CEPHALEXIN 500 MG/1
500 CAPSULE ORAL EVERY 8 HOURS SCHEDULED
Qty: 18 CAPSULE | Refills: 0 | Status: SHIPPED | OUTPATIENT
Start: 2022-10-15 | End: 2022-10-21

## 2022-10-15 RX ORDER — CELECOXIB 200 MG/1
200 CAPSULE ORAL DAILY
Qty: 30 CAPSULE | Refills: 0 | Status: SHIPPED | OUTPATIENT
Start: 2022-10-15 | End: 2022-11-14

## 2022-10-15 RX ORDER — ASPIRIN 81 MG/1
81 TABLET ORAL 2 TIMES DAILY
Qty: 80 TABLET | Refills: 0 | Status: SHIPPED | OUTPATIENT
Start: 2022-10-15 | End: 2022-11-24

## 2022-10-15 RX ORDER — ACETAMINOPHEN 500 MG
1000 TABLET ORAL EVERY 8 HOURS
Qty: 90 TABLET | Refills: 0 | Status: SHIPPED | OUTPATIENT
Start: 2022-10-15 | End: 2022-10-30

## 2022-10-15 RX ORDER — OXYCODONE HYDROCHLORIDE 5 MG/1
TABLET ORAL EVERY 4 HOURS PRN
Qty: 40 TABLET | Refills: 0 | Status: SHIPPED | OUTPATIENT
Start: 2022-10-15

## 2022-10-15 RX ORDER — TRAMADOL HYDROCHLORIDE 50 MG/1
50 TABLET ORAL EVERY 12 HOURS SCHEDULED
Qty: 30 TABLET | Refills: 0 | Status: SHIPPED | OUTPATIENT
Start: 2022-10-15 | End: 2022-10-30

## 2022-10-15 RX ORDER — PSEUDOEPHEDRINE HCL 30 MG
100 TABLET ORAL 2 TIMES DAILY PRN
Qty: 30 CAPSULE | Refills: 0 | Status: SHIPPED | OUTPATIENT
Start: 2022-10-15

## 2022-10-15 NOTE — PLAN OF CARE
POD 1 Lt total knee arthroplasty, Pt is AAOX4, VSS, room air, IV SL, SCD's, ASA, PO pain meds, see MAR, Aquacel remains CDI, gel ice as needed, compression sleeve in place, up SBA w/ RW, voiding freely to restroom, plan for discharge home w/ HH today, Pt doing well, all needs met, all safety measures in place, call light within reach, will CTM.

## 2022-10-15 NOTE — PROGRESS NOTES
[facility] Progress Note    Subjective: pain well controlled. Tolerating oral intake and voiding. Has been up to the bathroom a few times. Objective: sitting up bedside chair eating breakfast  LLE dressing CDI  NVI LLE      Assessment/Plan: 68year old male postop day #1 status post L TKA.   Doing exceptionally well despite medical history including DM, CKD, hyperlipidemia, neuropathy, BPH, Parkinson's disease  -Weightbearing as tolerated, PT OT  -DVT prophylaxis mechanical plus aspirin twice daily  -Pain control with oral meds  -Perioperative Ancef, 1 week Keflex postop due to diabetes and chronic kidney disease  Disposition: floor, fu therapy progress for dc home    Aleah Blackman MD, [address] [facility]  Phone [phone]  Fax [phone]

## 2022-10-15 NOTE — PLAN OF CARE
NURSING DISCHARGE NOTE    Discharged Home via Wheelchair. Accompanied by Spouse  Belongings Taken by patient/family. AVS printed and discussed, IV removed, Rx's sent to pharmacy, reminded to  medicine at pharmacy. Pt ready to discharge home w/ HH.

## 2022-10-15 NOTE — PLAN OF CARE
Pt given pain meds as requesting, see mar. Voiding in urinal. Tolerating diet.  in place, on ra, refusing scds. Dressing clean dry and intact. Gel ice in place. Plan home w  tomorrow. Pt verbalized understanding of poc& call dont fall protocol. Vss at this time. Pt requesting to sleep in recliner tonight.

## 2022-10-17 ENCOUNTER — TELEPHONE (OUTPATIENT)
Dept: ORTHOPEDICS CLINIC | Facility: CLINIC | Age: 77
End: 2022-10-17

## 2022-10-17 ENCOUNTER — PATIENT OUTREACH (OUTPATIENT)
Dept: CASE MANAGEMENT | Age: 77
End: 2022-10-17

## 2022-10-17 NOTE — TELEPHONE ENCOUNTER
Called patient to see where he would like to have PT done . Patient stated [facility] [place]. Patient gave me a fax number of [phone]. Called and tried to figure out how to get patient scheduled and they stated to fax order and they will contact patient. Called to inform patient physical therapy order  was faxed and to await the call of [facility] [place] and if they don't hear by in timely manner to try giving them a call .  P[atient understood and had no further questions

## 2022-10-17 NOTE — TELEPHONE ENCOUNTER
----- Message from Gini Venegas MD sent at 10/14/2022  6:56 AM CDT -----  Patient had knee replacement this morning - requested external PT at [facility] [place] [facility] ([place]oser to his [place])  PT order placed in Epic. Please ensure the order gets to patient's requested PT location.   Thank you

## 2022-10-17 NOTE — PROGRESS NOTES
Attempted to contact pt for TCM however no answer. Call continued to ring, sounded as though it answered but no response, call then ended. NCM to try again at a later time.

## 2022-10-29 RX ORDER — FLUTICASONE PROPIONATE AND SALMETEROL 113; 14 UG/1; UG/1
1 POWDER, METERED RESPIRATORY (INHALATION) 2 TIMES DAILY
Qty: 3 EACH | Refills: 1 | Status: SHIPPED | OUTPATIENT
Start: 2022-10-29

## 2022-10-31 ENCOUNTER — OFFICE VISIT (OUTPATIENT)
Dept: ORTHOPEDICS CLINIC | Facility: CLINIC | Age: 77
End: 2022-10-31
Payer: MEDICARE

## 2022-10-31 VITALS — WEIGHT: 210 LBS | BODY MASS INDEX: 29.4 KG/M2 | OXYGEN SATURATION: 93 % | HEIGHT: 71 IN | HEART RATE: 69 BPM

## 2022-10-31 DIAGNOSIS — Z96.652 STATUS POST LEFT KNEE REPLACEMENT: Primary | ICD-10-CM

## 2022-10-31 PROCEDURE — 3008F BODY MASS INDEX DOCD: CPT | Performed by: PHYSICIAN ASSISTANT

## 2022-10-31 PROCEDURE — 1111F DSCHRG MED/CURRENT MED MERGE: CPT | Performed by: PHYSICIAN ASSISTANT

## 2022-10-31 PROCEDURE — 99024 POSTOP FOLLOW-UP VISIT: CPT | Performed by: PHYSICIAN ASSISTANT

## 2022-10-31 RX ORDER — TRAMADOL HYDROCHLORIDE 50 MG/1
50 TABLET ORAL EVERY 12 HOURS PRN
Qty: 30 TABLET | Refills: 0 | Status: SHIPPED | OUTPATIENT
Start: 2022-10-31

## 2022-10-31 NOTE — PROGRESS NOTES
[facility] Progress Note    Subjective: Patient returns to clinic status post left total knee arthroplasty performed on 10/14/2022. He is doing very well with some discomfort along the proximal aspect of the knee along the distal quadriceps muscles. He is continuing with home physical therapy and will be discharged later this week and will initiate outpatient physical therapy at the beginning of the following week with [facility]. He continues to take tramadol twice per day with oxycodone as a breakthrough pain medication and would like a refill of tramadol today. Continues to take aspirin twice per day for DVT prophylaxis. No fevers, chills, or night sweats. No redness or drainage from the incision concerning for infection. Objective: Incision is clean, dry, and intact with staples in place. No redness or drainage concerning for infection. Patient ambulating independently with a smooth nonantalgic gait. Grossly neurologically intact. Nonlabored breathing. Alert and oriented x3. Assessment/Plan: Patient doing well status post left total knee arthroplasty performed on 10/14/2022. Staples were removed in clinic today. The incision was swabbed with Betadine, Mastisol was applied to either side of the incision, and Steri-Strips were applied over the incision. I instructed the patient to avoid showering for the next 2 days to allow the staple sites to reepithelialize. I also recommended avoiding soaking the incision in a pool or tub until the patient is 6 weeks postop. Refill of his tramadol was sent to his pharmacy. We will schedule a follow-up visit with Dr. Carleen Reis in 4 weeks for routine 6-week postop appointment or earlier if any concern. The patient and his daughter's questions were sought and answered satisfactorily. They are happy with the plan and will follow as advised.       Ravinder Morse PA-C  [facility] Orthopedic Surgery    This note was dictated using Dragon software. While it was briefly proofread prior to completion, some grammatical, spelling, and word choice errors due to dictation may still occur.

## 2022-11-01 ENCOUNTER — MED REC SCAN ONLY (OUTPATIENT)
Dept: ORTHOPEDICS CLINIC | Facility: CLINIC | Age: 77
End: 2022-11-01

## 2022-11-07 ENCOUNTER — TELEPHONE (OUTPATIENT)
Dept: FAMILY MEDICINE CLINIC | Facility: CLINIC | Age: 77
End: 2022-11-07

## 2022-11-07 ENCOUNTER — NURSE ONLY (OUTPATIENT)
Dept: FAMILY MEDICINE CLINIC | Facility: CLINIC | Age: 77
End: 2022-11-07
Payer: MEDICARE

## 2022-11-07 DIAGNOSIS — R30.0 DYSURIA: ICD-10-CM

## 2022-11-07 DIAGNOSIS — R30.0 DYSURIA: Primary | ICD-10-CM

## 2022-11-07 PROCEDURE — 87186 SC STD MICRODIL/AGAR DIL: CPT | Performed by: FAMILY MEDICINE

## 2022-11-07 PROCEDURE — 87077 CULTURE AEROBIC IDENTIFY: CPT | Performed by: FAMILY MEDICINE

## 2022-11-07 PROCEDURE — 87086 URINE CULTURE/COLONY COUNT: CPT | Performed by: FAMILY MEDICINE

## 2022-11-07 PROCEDURE — 1111F DSCHRG MED/CURRENT MED MERGE: CPT | Performed by: FAMILY MEDICINE

## 2022-11-07 NOTE — TELEPHONE ENCOUNTER
Can bring in Urine for Urine dip and Urine Cx.  However, if any severe symptoms - pain / fever / chills / poor appetite - with patients over 73 yo there is concern for UTI turning into a severe infection such as sepsis - so if there is concern that he is not himself or his appetite is off or looks pale etc should be seen in the ER/IC

## 2022-11-07 NOTE — TELEPHONE ENCOUNTER
Urine culture order placed    Pt's dtr notified of doctor's note below - she v/u  No further questions at this time

## 2022-11-07 NOTE — TELEPHONE ENCOUNTER
Pain with urination x 1 week    BS elevated 225 yesterday and 186 today    Daughter requesting urine cx    Please advise.

## 2022-11-08 ENCOUNTER — TELEPHONE (OUTPATIENT)
Dept: FAMILY MEDICINE CLINIC | Facility: CLINIC | Age: 77
End: 2022-11-08

## 2022-11-08 NOTE — TELEPHONE ENCOUNTER
----- Message from Kelly Scott DO sent at 11/8/2022  1:33 PM CST -----  Please notify Malik Rapp or his daughter that his urine culture came back positive for pseudomonas again if he symptomatic will do Levaquin 500 mg PO be ID for seven days

## 2022-11-08 NOTE — TELEPHONE ENCOUNTER
RN Spoke to daughter- she would like to wait to see the sensetivity? Does not want to risk pt becomign resistant to levaquin    OKay to wait?

## 2022-11-09 ENCOUNTER — TELEPHONE (OUTPATIENT)
Dept: FAMILY MEDICINE CLINIC | Facility: CLINIC | Age: 77
End: 2022-11-09

## 2022-11-09 RX ORDER — CEFDINIR 300 MG/1
300 CAPSULE ORAL 2 TIMES DAILY
Qty: 14 CAPSULE | Refills: 0 | Status: SHIPPED | OUTPATIENT
Start: 2022-11-09

## 2022-11-11 RX ORDER — CELECOXIB 200 MG/1
CAPSULE ORAL
Qty: 30 CAPSULE | Refills: 0 | Status: SHIPPED | OUTPATIENT
Start: 2022-11-11

## 2022-11-11 NOTE — TELEPHONE ENCOUNTER
DOS: 10/14/22  Last OV: 10/31/22  Last refill date: 10/15/22     #/refills: 0  Upcoming appt: 11/28/22

## 2022-11-28 ENCOUNTER — HOSPITAL ENCOUNTER (OUTPATIENT)
Dept: GENERAL RADIOLOGY | Age: 77
Discharge: HOME | End: 2022-11-28
Attending: ORTHOPAEDIC SURGERY
Payer: MEDICARE

## 2022-11-28 ENCOUNTER — OFFICE VISIT (OUTPATIENT)
Dept: ORTHOPEDICS CLINIC | Facility: CLINIC | Age: 77
End: 2022-11-28
Payer: MEDICARE

## 2022-11-28 ENCOUNTER — TELEPHONE (OUTPATIENT)
Dept: FAMILY MEDICINE CLINIC | Facility: CLINIC | Age: 77
End: 2022-11-28

## 2022-11-28 ENCOUNTER — NURSE ONLY (OUTPATIENT)
Dept: FAMILY MEDICINE CLINIC | Facility: CLINIC | Age: 77
End: 2022-11-28
Payer: MEDICARE

## 2022-11-28 VITALS — OXYGEN SATURATION: 95 % | WEIGHT: 210 LBS | HEART RATE: 67 BPM | HEIGHT: 71 IN | BODY MASS INDEX: 29.4 KG/M2

## 2022-11-28 DIAGNOSIS — Z96.652 STATUS POST LEFT KNEE REPLACEMENT: ICD-10-CM

## 2022-11-28 DIAGNOSIS — R30.0 DYSURIA: Primary | ICD-10-CM

## 2022-11-28 DIAGNOSIS — Z96.652 STATUS POST LEFT KNEE REPLACEMENT: Primary | ICD-10-CM

## 2022-11-28 LAB
BILIRUBIN: NEGATIVE
GLUCOSE (URINE DIPSTICK): NEGATIVE MG/DL
KETONES (URINE DIPSTICK): NEGATIVE MG/DL
MULTISTIX LOT#: ABNORMAL NUMERIC
NITRITE, URINE: POSITIVE
PH, URINE: 6 (ref 4.5–8)
PROTEIN (URINE DIPSTICK): >=300 MG/DL
SPECIFIC GRAVITY: >1.03 (ref 1–1.03)
UROBILINOGEN,SEMI-QN: 0.2 MG/DL (ref 0–1.9)

## 2022-11-28 PROCEDURE — 3008F BODY MASS INDEX DOCD: CPT | Performed by: ORTHOPAEDIC SURGERY

## 2022-11-28 PROCEDURE — 73562 X-RAY EXAM OF KNEE 3: CPT | Performed by: ORTHOPAEDIC SURGERY

## 2022-11-28 PROCEDURE — 99024 POSTOP FOLLOW-UP VISIT: CPT | Performed by: ORTHOPAEDIC SURGERY

## 2022-11-28 PROCEDURE — 87186 SC STD MICRODIL/AGAR DIL: CPT | Performed by: FAMILY MEDICINE

## 2022-11-28 PROCEDURE — 87086 URINE CULTURE/COLONY COUNT: CPT | Performed by: FAMILY MEDICINE

## 2022-11-28 PROCEDURE — 77073 BONE LENGTH STUDIES: CPT | Performed by: ORTHOPAEDIC SURGERY

## 2022-11-28 PROCEDURE — 87077 CULTURE AEROBIC IDENTIFY: CPT | Performed by: FAMILY MEDICINE

## 2022-11-28 RX ORDER — ASPIRIN 81 MG/1
81 TABLET ORAL DAILY
COMMUNITY

## 2022-11-28 NOTE — TELEPHONE ENCOUNTER
Pt is providing specimen- for urine testing    Pt is feeling burning- daughter states that his sugars have been 190 soni? Did you want dip and culture?     Future Appointments   Date Time Provider Kris Beckman   11/28/2022  2:00 PM  [facility],2Nd Floor [facility] N[facility] Gonzalez

## 2022-11-28 NOTE — PROGRESS NOTES
[facility] Progress Note    Subjective: Patient returns to clinic 6 weeks postop from left knee replacement. He reports that he is doing very well. He notes no limitations, no functional issues, no pain with the knee. He is not taking pain medication for the knee. He has had good healing of the incision without issue. He is ambulating without assist device. Objective: Patient is awake alert no distress. Left knee incision is well-healed. He demonstrates full extension, flexion just short of 120. Knee is appropriately stable to varus and valgus throughout range of motion, anterior drawer less than 5 mm      Imaging: X-rays of the left knee and full-length films personally viewed, independently interpreted and radiology report read. Demonstrates cemented left total knee arthroplasty unchanged from postop films. Patella tracking centrally. Assessment/Plan: 59-year-old male 6 weeks postop status post left total knee replacement. Patient is doing very well. Continue activities as tolerated without restriction. No restrictions on incision. He is done with twice daily aspirin DVT prophylaxis. He is not taking pain medications. He is going to finish up with physical therapy likely over the next few weeks. Did discuss follow-up at 1 year from date of surgery with x-rays.     Rubens Briscoe MD, [address] Orthopedic Surgery  Phone [phone]  Fax [phone]

## 2022-11-28 NOTE — PROGRESS NOTES
Pt was in office to provide urine for testing- was able t provide sample and left in stable condition    Results sent to provider

## 2022-12-02 ENCOUNTER — MOBILE ENCOUNTER (OUTPATIENT)
Dept: FAMILY MEDICINE CLINIC | Facility: CLINIC | Age: 77
End: 2022-12-02

## 2022-12-02 RX ORDER — CEFDINIR 300 MG/1
300 CAPSULE ORAL 2 TIMES DAILY
Qty: 14 CAPSULE | Refills: 0 | Status: SHIPPED | OUTPATIENT
Start: 2022-12-02

## 2022-12-08 ENCOUNTER — EXTERNAL RECORD (OUTPATIENT)
Dept: HEALTH INFORMATION MANAGEMENT | Facility: OTHER | Age: 77
End: 2022-12-08

## 2022-12-12 ENCOUNTER — MED REC SCAN ONLY (OUTPATIENT)
Dept: FAMILY MEDICINE CLINIC | Facility: CLINIC | Age: 77
End: 2022-12-12

## 2022-12-14 RX ORDER — LANCETS 33 GAUGE
1 EACH MISCELLANEOUS 2 TIMES DAILY
Qty: 200 EACH | Refills: 1 | Status: SHIPPED | OUTPATIENT
Start: 2022-12-14

## 2023-01-10 DIAGNOSIS — E11.9 CONTROLLED TYPE 2 DIABETES MELLITUS WITHOUT COMPLICATION, WITHOUT LONG-TERM CURRENT USE OF INSULIN (HCC): ICD-10-CM

## 2023-01-10 RX ORDER — BLOOD SUGAR DIAGNOSTIC
STRIP MISCELLANEOUS
Qty: 200 STRIP | Refills: 0 | Status: SHIPPED | OUTPATIENT
Start: 2023-01-10

## 2023-01-10 NOTE — TELEPHONE ENCOUNTER
Diabetic Supplies Protocol Passed 01/10/2023 12:35 AM    Appointment in the past 12 or next 3 months      Refilled per protocol  Glucose Blood ([place]) In Vitro Strip  Last refilled on 7/18/22 #200 with 0 rf.   LOV- 10/3/22  Last labs- 10/3/22    Sent to pharmacy

## 2023-01-16 ENCOUNTER — TELEPHONE (OUTPATIENT)
Dept: FAMILY MEDICINE CLINIC | Facility: CLINIC | Age: 78
End: 2023-01-16

## 2023-01-16 ENCOUNTER — NURSE ONLY (OUTPATIENT)
Dept: FAMILY MEDICINE CLINIC | Facility: CLINIC | Age: 78
End: 2023-01-16
Payer: MEDICARE

## 2023-01-16 DIAGNOSIS — R30.0 DYSURIA: ICD-10-CM

## 2023-01-16 DIAGNOSIS — R30.0 DYSURIA: Primary | ICD-10-CM

## 2023-01-16 LAB
BILIRUB UR QL STRIP.AUTO: NEGATIVE
CLARITY UR REFRACT.AUTO: CLEAR
COLOR UR AUTO: YELLOW
GLUCOSE UR STRIP.AUTO-MCNC: NEGATIVE MG/DL
KETONES UR STRIP.AUTO-MCNC: NEGATIVE MG/DL
NITRITE UR QL STRIP.AUTO: POSITIVE
PH UR STRIP.AUTO: 6 [PH] (ref 5–8)
PROT UR STRIP.AUTO-MCNC: >=300 MG/DL
SP GR UR STRIP.AUTO: 1.02 (ref 1–1.03)
UROBILINOGEN UR STRIP.AUTO-MCNC: 0.2 MG/DL
WBC #/AREA URNS AUTO: >50 /HPF
WBC #/AREA URNS AUTO: >50 /HPF
WBC CLUMPS UR QL AUTO: PRESENT /HPF
WBC CLUMPS UR QL AUTO: PRESENT /HPF

## 2023-01-16 PROCEDURE — 87077 CULTURE AEROBIC IDENTIFY: CPT | Performed by: FAMILY MEDICINE

## 2023-01-16 PROCEDURE — 87086 URINE CULTURE/COLONY COUNT: CPT | Performed by: FAMILY MEDICINE

## 2023-01-16 PROCEDURE — 81001 URINALYSIS AUTO W/SCOPE: CPT | Performed by: FAMILY MEDICINE

## 2023-01-16 PROCEDURE — 87186 SC STD MICRODIL/AGAR DIL: CPT | Performed by: FAMILY MEDICINE

## 2023-01-16 PROCEDURE — 81015 MICROSCOPIC EXAM OF URINE: CPT | Performed by: FAMILY MEDICINE

## 2023-01-16 NOTE — TELEPHONE ENCOUNTER
Blood sugarss are increasing- 212 on Saturday    Pt is also reporting pain with urination. Sunday pt is still having urinary symptoms    Pt was able to provide urine specimen for testing    PT also looking for ne urologist- per DS Dr. Flor Medrano is close to home. Daughter was advised.     UA and Culture- per verbal by Dr. Magalis Clark

## 2023-01-17 RX ORDER — CEFDINIR 300 MG/1
300 CAPSULE ORAL 2 TIMES DAILY
Qty: 14 CAPSULE | Refills: 0 | Status: SHIPPED | OUTPATIENT
Start: 2023-01-17

## 2023-01-18 ENCOUNTER — TELEPHONE (OUTPATIENT)
Dept: FAMILY MEDICINE CLINIC | Facility: CLINIC | Age: 78
End: 2023-01-18

## 2023-01-18 NOTE — TELEPHONE ENCOUNTER
----- Message from Domitila De La Cruz DO sent at 1/18/2023 11:18 AM CST -----  Is on Cefdinir should cover it

## 2023-01-24 ENCOUNTER — TELEPHONE (OUTPATIENT)
Dept: ENDOCRINOLOGY | Age: 78
End: 2023-01-24

## 2023-02-01 ENCOUNTER — TELEPHONE (OUTPATIENT)
Dept: FAMILY MEDICINE CLINIC | Facility: CLINIC | Age: 78
End: 2023-02-01

## 2023-02-01 RX ORDER — CLOTRIMAZOLE AND BETAMETHASONE DIPROPIONATE 10; .64 MG/G; MG/G
CREAM TOPICAL
Qty: 60 G | Refills: 3 | Status: SHIPPED | OUTPATIENT
Start: 2023-02-01

## 2023-02-01 NOTE — TELEPHONE ENCOUNTER
DAUGHTER STOPPED OVER AND ADV THAT PT HAS PAINFUL PENIS - NO DISCHARGE, BUT RED AND SWOLLEN AND PAINFUL X 1 WEEK.    DO YOU NEED TO SEE HIM OR CAN YOU CALL SOMETHING IN.    WOULD YOU LIKE A VIDEO VISIT????

## 2023-02-09 ENCOUNTER — PATIENT MESSAGE (OUTPATIENT)
Dept: FAMILY MEDICINE CLINIC | Facility: CLINIC | Age: 78
End: 2023-02-09

## 2023-02-09 NOTE — TELEPHONE ENCOUNTER
From: Dangelo Morales  To: Gregg Trevizo,   Sent: 2/9/2023 7:23 AM CST  Subject: Blood sugar     This message is being sent by Tee Gee on behalf of Dangelo Morales. Dr. Ed Perales  Dad's blood sugar has been running in the low 200's fasting. His eating is about the same. Wondering if he should go back on the glipizide or do you want him to get some labs or just make him an appointment.   Thanks  [employer]

## 2023-02-22 ENCOUNTER — TELEPHONE (OUTPATIENT)
Dept: FAMILY MEDICINE CLINIC | Facility: CLINIC | Age: 78
End: 2023-02-22

## 2023-02-22 RX ORDER — CEFDINIR 300 MG/1
300 CAPSULE ORAL 2 TIMES DAILY
Qty: 14 CAPSULE | Refills: 0 | Status: SHIPPED | OUTPATIENT
Start: 2023-02-22

## 2023-02-22 NOTE — TELEPHONE ENCOUNTER
Pt is having painful urination X3 days. No fever at this point.     Sugars had been running 200+ but they are in the 150s currently    Routing to provider    [facility] [place]

## 2023-04-17 ENCOUNTER — OFFICE VISIT (OUTPATIENT)
Dept: FAMILY MEDICINE CLINIC | Facility: CLINIC | Age: 78
End: 2023-04-17
Payer: MEDICARE

## 2023-04-17 VITALS
HEART RATE: 75 BPM | DIASTOLIC BLOOD PRESSURE: 62 MMHG | RESPIRATION RATE: 20 BRPM | SYSTOLIC BLOOD PRESSURE: 110 MMHG | WEIGHT: 206.13 LBS | OXYGEN SATURATION: 95 % | TEMPERATURE: 98 F | BODY MASS INDEX: 29 KG/M2

## 2023-04-17 DIAGNOSIS — J98.01 BRONCHOSPASM: ICD-10-CM

## 2023-04-17 DIAGNOSIS — R06.09 DYSPNEA ON EXERTION: ICD-10-CM

## 2023-04-17 DIAGNOSIS — D50.0 IRON DEFICIENCY ANEMIA DUE TO CHRONIC BLOOD LOSS: Primary | ICD-10-CM

## 2023-04-17 DIAGNOSIS — J18.9 BILATERAL UPPER LOBE COMMUNITY ACQUIRED PNEUMONIA: ICD-10-CM

## 2023-04-17 DIAGNOSIS — J44.1 COPD EXACERBATION (HCC): ICD-10-CM

## 2023-04-17 LAB
BASOPHILS # BLD AUTO: 0.04 X10(3) UL (ref 0–0.2)
BASOPHILS NFR BLD AUTO: 0.6 %
EOSINOPHIL # BLD AUTO: 0.2 X10(3) UL (ref 0–0.7)
EOSINOPHIL NFR BLD AUTO: 3 %
ERYTHROCYTE [DISTWIDTH] IN BLOOD BY AUTOMATED COUNT: 15.4 %
HCT VFR BLD AUTO: 31.6 %
HGB BLD-MCNC: 10.4 G/DL
IMM GRANULOCYTES # BLD AUTO: 0.01 X10(3) UL (ref 0–1)
IMM GRANULOCYTES NFR BLD: 0.1 %
LYMPHOCYTES # BLD AUTO: 0.89 X10(3) UL (ref 1–4)
LYMPHOCYTES NFR BLD AUTO: 13.1 %
MCH RBC QN AUTO: 28.4 PG (ref 26–34)
MCHC RBC AUTO-ENTMCNC: 32.9 G/DL (ref 31–37)
MCV RBC AUTO: 86.3 FL
MONOCYTES # BLD AUTO: 0.52 X10(3) UL (ref 0.1–1)
MONOCYTES NFR BLD AUTO: 7.7 %
NEUTROPHILS # BLD AUTO: 5.11 X10 (3) UL (ref 1.5–7.7)
NEUTROPHILS # BLD AUTO: 5.11 X10(3) UL (ref 1.5–7.7)
NEUTROPHILS NFR BLD AUTO: 75.5 %
PLATELET # BLD AUTO: 275 10(3)UL (ref 150–450)
RBC # BLD AUTO: 3.66 X10(6)UL
WBC # BLD AUTO: 6.8 X10(3) UL (ref 4–11)

## 2023-04-17 PROCEDURE — 85025 COMPLETE CBC W/AUTO DIFF WBC: CPT | Performed by: FAMILY MEDICINE

## 2023-04-17 RX ORDER — ALBUTEROL SULFATE 90 UG/1
2 AEROSOL, METERED RESPIRATORY (INHALATION) EVERY 6 HOURS PRN
Qty: 1 EACH | Refills: 3 | Status: SHIPPED | OUTPATIENT
Start: 2023-04-17 | End: 2023-05-17

## 2023-04-17 RX ORDER — IPRATROPIUM/ALBUTEROL SULFATE 20-100 MCG
1 MIST INHALER (GRAM) INHALATION 2 TIMES DAILY
COMMUNITY

## 2023-04-17 RX ORDER — ALBUTEROL SULFATE 2.5 MG/3ML
2.5 SOLUTION RESPIRATORY (INHALATION) EVERY 4 HOURS PRN
Qty: 1 EACH | Refills: 3 | Status: SHIPPED | OUTPATIENT
Start: 2023-04-17 | End: 2023-05-17

## 2023-04-17 RX ORDER — OMEPRAZOLE 20 MG/1
20 CAPSULE, DELAYED RELEASE ORAL DAILY
COMMUNITY
Start: 2023-03-22

## 2023-04-19 ENCOUNTER — TELEPHONE (OUTPATIENT)
Dept: FAMILY MEDICINE CLINIC | Facility: CLINIC | Age: 78
End: 2023-04-19

## 2023-04-19 RX ORDER — FLUTICASONE FUROATE AND VILANTEROL 200; 25 UG/1; UG/1
1 POWDER RESPIRATORY (INHALATION) DAILY
Qty: 30 EACH | Refills: 5 | Status: SHIPPED | OUTPATIENT
Start: 2023-04-19 | End: 2023-05-19

## 2023-04-19 NOTE — TELEPHONE ENCOUNTER
Venus Alpers with [employer] states that  Pt's Medication (Advair) needs to be brand name or brio. Pharmacy was trying to fill medication. Please advise. Thank you!         [facility] [address] [place] [address] [place] [address], [phone]   Hope Valdes [address]   Phone: [phone] Fax: [phone]

## 2023-04-21 ENCOUNTER — MED REC SCAN ONLY (OUTPATIENT)
Dept: FAMILY MEDICINE CLINIC | Facility: CLINIC | Age: 78
End: 2023-04-21

## 2023-05-09 ENCOUNTER — TELEPHONE (OUTPATIENT)
Dept: FAMILY MEDICINE CLINIC | Facility: CLINIC | Age: 78
End: 2023-05-09

## 2023-05-09 ENCOUNTER — NURSE ONLY (OUTPATIENT)
Dept: FAMILY MEDICINE CLINIC | Facility: CLINIC | Age: 78
End: 2023-05-09
Payer: MEDICARE

## 2023-05-09 DIAGNOSIS — R30.0 DYSURIA: ICD-10-CM

## 2023-05-09 DIAGNOSIS — R30.0 DYSURIA: Primary | ICD-10-CM

## 2023-05-09 LAB
BILIRUB UR QL STRIP.AUTO: NEGATIVE
COLOR UR AUTO: YELLOW
GLUCOSE UR STRIP.AUTO-MCNC: NEGATIVE MG/DL
HYALINE CASTS #/AREA URNS AUTO: PRESENT /LPF
KETONES UR STRIP.AUTO-MCNC: NEGATIVE MG/DL
NITRITE UR QL STRIP.AUTO: NEGATIVE
PH UR STRIP.AUTO: 5 [PH] (ref 5–8)
PROT UR STRIP.AUTO-MCNC: 100 MG/DL
RBC #/AREA URNS AUTO: >10 /HPF
SP GR UR STRIP.AUTO: 1.02 (ref 1–1.03)
UROBILINOGEN UR STRIP.AUTO-MCNC: <2 MG/DL
WBC #/AREA URNS AUTO: >50 /HPF
WBC CLUMPS UR QL AUTO: PRESENT /HPF

## 2023-05-09 PROCEDURE — 81001 URINALYSIS AUTO W/SCOPE: CPT | Performed by: FAMILY MEDICINE

## 2023-05-09 PROCEDURE — 87086 URINE CULTURE/COLONY COUNT: CPT | Performed by: FAMILY MEDICINE

## 2023-05-09 NOTE — PROGRESS NOTES
Patient to clinic to provide urine sample   Sample provided, patient left office in stable condition  Patient left office in stable condition    Urine sent in yellow and gray top tube

## 2023-05-09 NOTE — TELEPHONE ENCOUNTER
PATIENT DAUGHTER CALLING REQUESTING A U/A AND CULTURE FOR PATIENT. PATIENT HAVING FREQUENT AND PAINFUL URINATION.

## 2023-05-09 NOTE — TELEPHONE ENCOUNTER
Orders have been placed for send out UA and Culture    Spoke to to daughter- she has a couple cefdinir tablets left from the infection in Feb- is it okay to start after provides urine specimen    Future Appointments   Date Time Provider Kris Beckman   5/9/2023  4:00 PM  [facility] [address],2[address] Floor [facility] Katya Quinn

## 2023-05-11 ENCOUNTER — MED REC SCAN ONLY (OUTPATIENT)
Dept: FAMILY MEDICINE CLINIC | Facility: CLINIC | Age: 78
End: 2023-05-11

## 2023-05-12 ENCOUNTER — HOME HEALTH CHARGES (OUTPATIENT)
Dept: FAMILY MEDICINE CLINIC | Facility: CLINIC | Age: 78
End: 2023-05-12

## 2023-05-12 DIAGNOSIS — J44.1 CHRONIC OBSTRUCTIVE PULMONARY DISEASE WITH ACUTE EXACERBATION (HCC): ICD-10-CM

## 2023-06-24 ENCOUNTER — PATIENT MESSAGE (OUTPATIENT)
Dept: FAMILY MEDICINE CLINIC | Facility: CLINIC | Age: 78
End: 2023-06-24

## 2023-06-24 NOTE — TELEPHONE ENCOUNTER
From: Neil Sue  To: Andressa Mayen DO  Sent: 6/24/2023 8:48 AM CDT  Subject: Diana Blake    This message is being sent by Moriah Park on behalf of Neil Sue. Good morning,   Dad needs a refill of his Januvia, but the last refill was for 25mg tablets and he takes 50mg daily. I doubled up the medication but he is almost out. Can you please send the 50mg tablets 90 days to [facility] in [place]?     Thank you  Nunu López

## 2023-06-29 ENCOUNTER — OFFICE VISIT (OUTPATIENT)
Dept: FAMILY MEDICINE CLINIC | Facility: CLINIC | Age: 78
End: 2023-06-29
Payer: MEDICARE

## 2023-06-29 VITALS
BODY MASS INDEX: 21 KG/M2 | HEART RATE: 86 BPM | SYSTOLIC BLOOD PRESSURE: 136 MMHG | DIASTOLIC BLOOD PRESSURE: 78 MMHG | TEMPERATURE: 97 F | WEIGHT: 150 LBS | OXYGEN SATURATION: 98 % | RESPIRATION RATE: 20 BRPM

## 2023-06-29 DIAGNOSIS — I10 PRIMARY HYPERTENSION: ICD-10-CM

## 2023-06-29 DIAGNOSIS — M54.41 CHRONIC MIDLINE LOW BACK PAIN WITH BILATERAL SCIATICA: ICD-10-CM

## 2023-06-29 DIAGNOSIS — F33.9 DEPRESSION, RECURRENT (HCC): ICD-10-CM

## 2023-06-29 DIAGNOSIS — G89.29 CHRONIC MIDLINE LOW BACK PAIN WITH BILATERAL SCIATICA: ICD-10-CM

## 2023-06-29 DIAGNOSIS — R26.81 GAIT INSTABILITY: ICD-10-CM

## 2023-06-29 DIAGNOSIS — Z13.1 SCREENING FOR DIABETES MELLITUS (DM): ICD-10-CM

## 2023-06-29 DIAGNOSIS — Z22.39 PSEUDOMONAS AERUGINOSA COLONIZATION: ICD-10-CM

## 2023-06-29 DIAGNOSIS — J41.0 SMOKERS' COUGH (HCC): Chronic | ICD-10-CM

## 2023-06-29 DIAGNOSIS — N40.1 BENIGN PROSTATIC HYPERPLASIA WITH NOCTURIA: ICD-10-CM

## 2023-06-29 DIAGNOSIS — G89.29 CHRONIC PAIN OF LEFT KNEE: ICD-10-CM

## 2023-06-29 DIAGNOSIS — E11.22 CKD STAGE 3 DUE TO TYPE 2 DIABETES MELLITUS (HCC): Chronic | ICD-10-CM

## 2023-06-29 DIAGNOSIS — Z00.00 MEDICARE ANNUAL WELLNESS VISIT, SUBSEQUENT: Primary | ICD-10-CM

## 2023-06-29 DIAGNOSIS — M54.42 CHRONIC MIDLINE LOW BACK PAIN WITH BILATERAL SCIATICA: ICD-10-CM

## 2023-06-29 DIAGNOSIS — G56.02 LEFT CARPAL TUNNEL SYNDROME: ICD-10-CM

## 2023-06-29 DIAGNOSIS — Z13.6 SCREENING FOR CARDIOVASCULAR CONDITION: ICD-10-CM

## 2023-06-29 DIAGNOSIS — M17.12 ARTHRITIS OF LEFT KNEE: ICD-10-CM

## 2023-06-29 DIAGNOSIS — G25.81 RESTLESS LEGS: ICD-10-CM

## 2023-06-29 DIAGNOSIS — R94.39 ABNORMAL NUCLEAR STRESS TEST: ICD-10-CM

## 2023-06-29 DIAGNOSIS — E78.2 MIXED HYPERLIPIDEMIA: ICD-10-CM

## 2023-06-29 DIAGNOSIS — E11.42 DIABETIC POLYNEUROPATHY ASSOCIATED WITH TYPE 2 DIABETES MELLITUS (HCC): ICD-10-CM

## 2023-06-29 DIAGNOSIS — G20 PARKINSON'S DISEASE (HCC): ICD-10-CM

## 2023-06-29 DIAGNOSIS — K76.0 HEPATIC STEATOSIS: ICD-10-CM

## 2023-06-29 DIAGNOSIS — J43.2 CENTRILOBULAR EMPHYSEMA (HCC): ICD-10-CM

## 2023-06-29 DIAGNOSIS — E11.9 TYPE 2 DIABETES MELLITUS WITHOUT COMPLICATION, WITHOUT LONG-TERM CURRENT USE OF INSULIN (HCC): ICD-10-CM

## 2023-06-29 DIAGNOSIS — R13.11 ORAL PHASE DYSPHAGIA: ICD-10-CM

## 2023-06-29 DIAGNOSIS — Q61.02 MULTIPLE RENAL CYSTS: ICD-10-CM

## 2023-06-29 DIAGNOSIS — R35.1 BENIGN PROSTATIC HYPERPLASIA WITH NOCTURIA: ICD-10-CM

## 2023-06-29 DIAGNOSIS — Z00.00 ENCOUNTER FOR ANNUAL HEALTH EXAMINATION: ICD-10-CM

## 2023-06-29 DIAGNOSIS — M25.562 CHRONIC PAIN OF LEFT KNEE: ICD-10-CM

## 2023-06-29 DIAGNOSIS — Z00.00 ENCOUNTER FOR MEDICARE ANNUAL WELLNESS EXAM: ICD-10-CM

## 2023-06-29 DIAGNOSIS — N18.30 CKD STAGE 3 DUE TO TYPE 2 DIABETES MELLITUS (HCC): Chronic | ICD-10-CM

## 2023-06-29 LAB
BASOPHILS # BLD AUTO: 0.02 X10(3) UL (ref 0–0.2)
BASOPHILS NFR BLD AUTO: 0.3 %
EOSINOPHIL # BLD AUTO: 0.14 X10(3) UL (ref 0–0.7)
EOSINOPHIL NFR BLD AUTO: 2.3 %
ERYTHROCYTE [DISTWIDTH] IN BLOOD BY AUTOMATED COUNT: 15.4 %
HCT VFR BLD AUTO: 37.3 %
HGB BLD-MCNC: 11.7 G/DL
IMM GRANULOCYTES # BLD AUTO: 0.03 X10(3) UL (ref 0–1)
IMM GRANULOCYTES NFR BLD: 0.5 %
LYMPHOCYTES # BLD AUTO: 0.77 X10(3) UL (ref 1–4)
LYMPHOCYTES NFR BLD AUTO: 12.5 %
MCH RBC QN AUTO: 24.1 PG (ref 26–34)
MCHC RBC AUTO-ENTMCNC: 31.4 G/DL (ref 31–37)
MCV RBC AUTO: 76.9 FL
MONOCYTES # BLD AUTO: 0.39 X10(3) UL (ref 0.1–1)
MONOCYTES NFR BLD AUTO: 6.4 %
NEUTROPHILS # BLD AUTO: 4.79 X10 (3) UL (ref 1.5–7.7)
NEUTROPHILS # BLD AUTO: 4.79 X10(3) UL (ref 1.5–7.7)
NEUTROPHILS NFR BLD AUTO: 78 %
PLATELET # BLD AUTO: 361 10(3)UL (ref 150–450)
RBC # BLD AUTO: 4.85 X10(6)UL
WBC # BLD AUTO: 6.1 X10(3) UL (ref 4–11)

## 2023-06-29 PROCEDURE — 83036 HEMOGLOBIN GLYCOSYLATED A1C: CPT | Performed by: FAMILY MEDICINE

## 2023-06-29 PROCEDURE — 80053 COMPREHEN METABOLIC PANEL: CPT | Performed by: FAMILY MEDICINE

## 2023-06-29 PROCEDURE — 85025 COMPLETE CBC W/AUTO DIFF WBC: CPT | Performed by: FAMILY MEDICINE

## 2023-06-29 PROCEDURE — 84443 ASSAY THYROID STIM HORMONE: CPT | Performed by: FAMILY MEDICINE

## 2023-06-29 PROCEDURE — 80061 LIPID PANEL: CPT | Performed by: FAMILY MEDICINE

## 2023-06-30 DIAGNOSIS — E11.9 TYPE 2 DIABETES MELLITUS WITHOUT COMPLICATION, WITHOUT LONG-TERM CURRENT USE OF INSULIN (HCC): Primary | ICD-10-CM

## 2023-06-30 LAB
ALBUMIN SERPL-MCNC: 3.4 G/DL (ref 3.4–5)
ALBUMIN/GLOB SERPL: 1 {RATIO} (ref 1–2)
ALP LIVER SERPL-CCNC: 143 U/L
ALT SERPL-CCNC: 24 U/L
ANION GAP SERPL CALC-SCNC: 8 MMOL/L (ref 0–18)
AST SERPL-CCNC: 26 U/L (ref 15–37)
BILIRUB SERPL-MCNC: 0.6 MG/DL (ref 0.1–2)
BUN BLD-MCNC: 16 MG/DL (ref 7–18)
CALCIUM BLD-MCNC: 8.9 MG/DL (ref 8.5–10.1)
CHLORIDE SERPL-SCNC: 104 MMOL/L (ref 98–112)
CHOLEST SERPL-MCNC: 123 MG/DL (ref ?–200)
CO2 SERPL-SCNC: 24 MMOL/L (ref 21–32)
CREAT BLD-MCNC: 1.31 MG/DL
EST. AVERAGE GLUCOSE BLD GHB EST-MCNC: 157 MG/DL (ref 68–126)
FASTING PATIENT LIPID ANSWER: NO
FASTING STATUS PATIENT QL REPORTED: NO
GFR SERPLBLD BASED ON 1.73 SQ M-ARVRAT: 56 ML/MIN/1.73M2 (ref 60–?)
GLOBULIN PLAS-MCNC: 3.5 G/DL (ref 2.8–4.4)
GLUCOSE BLD-MCNC: 238 MG/DL (ref 70–99)
HBA1C MFR BLD: 7.1 % (ref ?–5.7)
HDLC SERPL-MCNC: 30 MG/DL (ref 40–59)
LDLC SERPL CALC-MCNC: 20 MG/DL (ref ?–100)
NONHDLC SERPL-MCNC: 93 MG/DL (ref ?–130)
OSMOLALITY SERPL CALC.SUM OF ELEC: 291 MOSM/KG (ref 275–295)
POTASSIUM SERPL-SCNC: 4 MMOL/L (ref 3.5–5.1)
PROT SERPL-MCNC: 6.9 G/DL (ref 6.4–8.2)
SODIUM SERPL-SCNC: 136 MMOL/L (ref 136–145)
TRIGL SERPL-MCNC: 549 MG/DL (ref 30–149)
TSI SER-ACNC: 2.77 MIU/ML (ref 0.36–3.74)
VLDLC SERPL CALC-MCNC: 69 MG/DL (ref 0–30)

## 2023-07-03 RX ORDER — CITALOPRAM 20 MG/1
20 TABLET ORAL DAILY
Qty: 90 TABLET | Refills: 2 | Status: SHIPPED | OUTPATIENT
Start: 2023-07-03

## 2023-07-14 DIAGNOSIS — E04.1 THYROID NODULE: Primary | ICD-10-CM

## 2023-08-10 ENCOUNTER — TELEPHONE (OUTPATIENT)
Dept: FAMILY MEDICINE CLINIC | Facility: CLINIC | Age: 78
End: 2023-08-10

## 2023-08-10 NOTE — TELEPHONE ENCOUNTER
MOVED APT TO 3:20       Future Appointments   Date Time Provider Kris Beckman   8/17/2023  3:20 PM Herve Holden DO [facility] Emery Clutter   8/23/2023 11:00 AM PF [facility] PF [facility]

## 2023-08-17 ENCOUNTER — OFFICE VISIT (OUTPATIENT)
Dept: FAMILY MEDICINE CLINIC | Facility: CLINIC | Age: 78
End: 2023-08-17
Payer: MEDICARE

## 2023-08-17 VITALS
BODY MASS INDEX: 28 KG/M2 | RESPIRATION RATE: 16 BRPM | HEART RATE: 61 BPM | DIASTOLIC BLOOD PRESSURE: 58 MMHG | OXYGEN SATURATION: 95 % | SYSTOLIC BLOOD PRESSURE: 106 MMHG | TEMPERATURE: 97 F | WEIGHT: 197.5 LBS

## 2023-08-17 DIAGNOSIS — J44.1 COPD EXACERBATION (HCC): ICD-10-CM

## 2023-08-17 DIAGNOSIS — J43.2 CENTRILOBULAR EMPHYSEMA (HCC): ICD-10-CM

## 2023-08-17 DIAGNOSIS — J44.1 CHRONIC OBSTRUCTIVE PULMONARY DISEASE WITH ACUTE EXACERBATION (HCC): ICD-10-CM

## 2023-08-17 DIAGNOSIS — R06.09 DYSPNEA ON EXERTION: ICD-10-CM

## 2023-08-17 DIAGNOSIS — J18.9 BILATERAL UPPER LOBE COMMUNITY ACQUIRED PNEUMONIA: Primary | ICD-10-CM

## 2023-08-17 PROBLEM — J98.4 CALCIFIED GRANULOMA OF LUNG: Status: ACTIVE | Noted: 2023-07-13

## 2023-08-17 PROBLEM — J84.10 CALCIFIED GRANULOMA OF LUNG (HCC): Status: ACTIVE | Noted: 2023-07-13

## 2023-08-17 PROBLEM — J84.10 CALCIFIED GRANULOMA OF LUNG: Status: ACTIVE | Noted: 2023-07-13

## 2023-08-17 PROCEDURE — 1111F DSCHRG MED/CURRENT MED MERGE: CPT | Performed by: FAMILY MEDICINE

## 2023-08-17 PROCEDURE — 3078F DIAST BP <80 MM HG: CPT | Performed by: FAMILY MEDICINE

## 2023-08-17 PROCEDURE — 3074F SYST BP LT 130 MM HG: CPT | Performed by: FAMILY MEDICINE

## 2023-08-17 PROCEDURE — 1159F MED LIST DOCD IN RCRD: CPT | Performed by: FAMILY MEDICINE

## 2023-08-17 PROCEDURE — 99495 TRANSJ CARE MGMT MOD F2F 14D: CPT | Performed by: FAMILY MEDICINE

## 2023-08-17 RX ORDER — ROPINIROLE 5 MG/1
5 TABLET, FILM COATED ORAL NIGHTLY
COMMUNITY

## 2023-08-23 ENCOUNTER — HOSPITAL ENCOUNTER (OUTPATIENT)
Dept: ULTRASOUND IMAGING | Age: 78
Discharge: HOME | End: 2023-08-23
Attending: FAMILY MEDICINE
Payer: MEDICARE

## 2023-08-23 DIAGNOSIS — E04.1 THYROID NODULE: ICD-10-CM

## 2023-08-23 PROCEDURE — 76536 US EXAM OF HEAD AND NECK: CPT | Performed by: FAMILY MEDICINE

## 2023-08-24 ENCOUNTER — TELEPHONE (OUTPATIENT)
Dept: FAMILY MEDICINE CLINIC | Facility: CLINIC | Age: 78
End: 2023-08-24

## 2023-08-24 DIAGNOSIS — E04.1 THYROID NODULE: Primary | ICD-10-CM

## 2023-08-24 NOTE — TELEPHONE ENCOUNTER
Pt daughter returining Dr. Seng Naranjo phone call for pt ultrasound results. Pt daughter asking PCP to call her at [phone]    Thank you in advance!

## 2023-08-26 ENCOUNTER — TELEPHONE (OUTPATIENT)
Dept: FAMILY MEDICINE CLINIC | Facility: CLINIC | Age: 78
End: 2023-08-26

## 2023-08-26 ENCOUNTER — MED REC SCAN ONLY (OUTPATIENT)
Dept: FAMILY MEDICINE CLINIC | Facility: CLINIC | Age: 78
End: 2023-08-26

## 2023-09-11 ENCOUNTER — OFFICE VISIT (OUTPATIENT)
Dept: FAMILY MEDICINE CLINIC | Facility: CLINIC | Age: 78
End: 2023-09-11
Payer: MEDICARE

## 2023-09-11 VITALS
BODY MASS INDEX: 29 KG/M2 | DIASTOLIC BLOOD PRESSURE: 64 MMHG | RESPIRATION RATE: 16 BRPM | WEIGHT: 207.38 LBS | TEMPERATURE: 97 F | HEART RATE: 62 BPM | OXYGEN SATURATION: 93 % | SYSTOLIC BLOOD PRESSURE: 124 MMHG

## 2023-09-11 DIAGNOSIS — J44.1 COPD EXACERBATION (HCC): ICD-10-CM

## 2023-09-11 DIAGNOSIS — J44.1 CHRONIC OBSTRUCTIVE PULMONARY DISEASE WITH ACUTE EXACERBATION (HCC): ICD-10-CM

## 2023-09-11 DIAGNOSIS — R06.09 DYSPNEA ON EXERTION: ICD-10-CM

## 2023-09-11 DIAGNOSIS — E11.9 TYPE 2 DIABETES MELLITUS WITHOUT COMPLICATION, WITHOUT LONG-TERM CURRENT USE OF INSULIN (HCC): Primary | ICD-10-CM

## 2023-09-11 DIAGNOSIS — J18.9 BILATERAL UPPER LOBE COMMUNITY ACQUIRED PNEUMONIA: ICD-10-CM

## 2023-09-11 DIAGNOSIS — J43.2 CENTRILOBULAR EMPHYSEMA (HCC): ICD-10-CM

## 2023-09-11 PROBLEM — J84.10 PULMONARY FIBROSIS, POSTINFLAMMATORY (HCC): Status: ACTIVE | Noted: 2023-07-13

## 2023-09-11 PROCEDURE — 3078F DIAST BP <80 MM HG: CPT | Performed by: FAMILY MEDICINE

## 2023-09-11 PROCEDURE — 99214 OFFICE O/P EST MOD 30 MIN: CPT | Performed by: FAMILY MEDICINE

## 2023-09-11 PROCEDURE — 3074F SYST BP LT 130 MM HG: CPT | Performed by: FAMILY MEDICINE

## 2023-09-11 RX ORDER — PANTOPRAZOLE SODIUM 40 MG/1
40 TABLET, DELAYED RELEASE ORAL
COMMUNITY

## 2023-09-12 ENCOUNTER — MED REC SCAN ONLY (OUTPATIENT)
Dept: FAMILY MEDICINE CLINIC | Facility: CLINIC | Age: 78
End: 2023-09-12

## 2023-09-13 ENCOUNTER — HOME HEALTH CHARGES (OUTPATIENT)
Dept: FAMILY MEDICINE CLINIC | Facility: CLINIC | Age: 78
End: 2023-09-13

## 2023-09-13 DIAGNOSIS — J18.9 PNEUMONIA, ORGANISM UNSPECIFIED: Primary | ICD-10-CM

## 2023-09-28 ENCOUNTER — TELEPHONE (OUTPATIENT)
Dept: FAMILY MEDICINE CLINIC | Facility: CLINIC | Age: 78
End: 2023-09-28

## 2023-09-28 NOTE — TELEPHONE ENCOUNTER
PATIENT DAUGHTER CALLING. SHE ASKS IF HER FATHER CAN TAKE ANYTHING ELSE.  THE BRENNANUVIA IS $450.00

## 2023-09-28 NOTE — TELEPHONE ENCOUNTER
Called [facility]#[phone] - advised of note below  Pharmacist stated \"Whole amount going to coverage gap - donut hole, Everything looks expensive in $400s+\"    Advised patient's dtr of note above. She verbalized understanding. No further questions at this time.     Please advise, thank you

## 2023-10-05 ENCOUNTER — TELEPHONE (OUTPATIENT)
Dept: FAMILY MEDICINE CLINIC | Facility: CLINIC | Age: 78
End: 2023-10-05

## 2023-10-05 ENCOUNTER — NURSE ONLY (OUTPATIENT)
Dept: FAMILY MEDICINE CLINIC | Facility: CLINIC | Age: 78
End: 2023-10-05
Payer: MEDICARE

## 2023-10-05 DIAGNOSIS — R39.9 UTI SYMPTOMS: ICD-10-CM

## 2023-10-05 DIAGNOSIS — N18.30 CKD STAGE 3 DUE TO TYPE 2 DIABETES MELLITUS (HCC): Chronic | ICD-10-CM

## 2023-10-05 DIAGNOSIS — E11.9 TYPE 2 DIABETES MELLITUS WITHOUT COMPLICATION, WITHOUT LONG-TERM CURRENT USE OF INSULIN (HCC): ICD-10-CM

## 2023-10-05 DIAGNOSIS — E11.22 CKD STAGE 3 DUE TO TYPE 2 DIABETES MELLITUS (HCC): Chronic | ICD-10-CM

## 2023-10-05 DIAGNOSIS — R39.9 UTI SYMPTOMS: Primary | ICD-10-CM

## 2023-10-05 LAB
BILIRUB UR QL STRIP.AUTO: NEGATIVE
CREAT UR-SCNC: 86.2 MG/DL
GLUCOSE UR STRIP.AUTO-MCNC: >1000 MG/DL
KETONES UR STRIP.AUTO-MCNC: NEGATIVE MG/DL
LEUKOCYTE ESTERASE UR QL STRIP.AUTO: 500
MICROALBUMIN UR-MCNC: 164 MG/DL
MICROALBUMIN/CREAT 24H UR-RTO: 1902.6 UG/MG (ref ?–30)
NITRITE UR QL STRIP.AUTO: NEGATIVE
PH UR STRIP.AUTO: 6 [PH] (ref 5–8)
PROT UR STRIP.AUTO-MCNC: 100 MG/DL
SP GR UR STRIP.AUTO: 1.01 (ref 1–1.03)
UROBILINOGEN UR STRIP.AUTO-MCNC: NORMAL MG/DL
WBC #/AREA URNS AUTO: >50 /HPF
WBC CLUMPS UR QL AUTO: PRESENT /HPF

## 2023-10-05 PROCEDURE — 87086 URINE CULTURE/COLONY COUNT: CPT | Performed by: FAMILY MEDICINE

## 2023-10-05 PROCEDURE — 81001 URINALYSIS AUTO W/SCOPE: CPT | Performed by: FAMILY MEDICINE

## 2023-10-05 PROCEDURE — 87077 CULTURE AEROBIC IDENTIFY: CPT | Performed by: FAMILY MEDICINE

## 2023-10-05 PROCEDURE — 82570 ASSAY OF URINE CREATININE: CPT | Performed by: FAMILY MEDICINE

## 2023-10-05 PROCEDURE — 82043 UR ALBUMIN QUANTITATIVE: CPT | Performed by: FAMILY MEDICINE

## 2023-10-05 PROCEDURE — 87186 SC STD MICRODIL/AGAR DIL: CPT | Performed by: FAMILY MEDICINE

## 2023-10-05 NOTE — TELEPHONE ENCOUNTER
Pt is having Frequency and burning with urination. Wants to know if you want to see him or just do a urine culture. Please advise. Thank you!

## 2023-10-05 NOTE — TELEPHONE ENCOUNTER
Discussed with Dr. Laura Acuña regarding note below - please order urinalysis and urine culture, can be done at [facility] office    Left detailed message to voicemail (per verbal release form consent with confirmed identifying message) of doctor's note above. Patient's dtr was advised to call office back with any questions/concerns.

## 2023-10-05 NOTE — PROGRESS NOTES
Hussain Bishop present in office for nurse visit. Urine sample obtained     All questions/concerns addressed. Patient left in stable condition.

## 2023-10-13 RX ORDER — ALBUTEROL SULFATE 2.5 MG/3ML
2.5 SOLUTION RESPIRATORY (INHALATION) EVERY 4 HOURS PRN
Qty: 75 ML | Refills: 3 | Status: SHIPPED | OUTPATIENT
Start: 2023-10-13

## 2023-10-13 NOTE — TELEPHONE ENCOUNTER
LOV 08/17/23  Last refill on 04/17/23, for #1each, with 3 refills  albuterol 108 (90 Base) MCG/ACT Inhalation Aero Soln     Asthma & COPD Medication Protocol [id number]/13/2023 08:32 AM    Asthma Action Score greater than or equal to 20    AAP/ACT given in last 12 months    Appointment in past 6 or next 3 months        No future appointments. Order(s) pending, please review. Thank you.

## 2023-11-02 ENCOUNTER — HOSPITAL ENCOUNTER (OUTPATIENT)
Dept: ULTRASOUND IMAGING | Facility: HOSPITAL | Age: 78
Discharge: HOME | End: 2023-11-02
Attending: FAMILY MEDICINE
Payer: MEDICARE

## 2023-11-02 DIAGNOSIS — E04.1 THYROID NODULE: ICD-10-CM

## 2023-11-02 PROCEDURE — 88173 CYTOPATH EVAL FNA REPORT: CPT | Performed by: FAMILY MEDICINE

## 2023-11-02 PROCEDURE — 10006 FNA BX W/US GDN EA ADDL: CPT | Performed by: FAMILY MEDICINE

## 2023-11-02 PROCEDURE — 10005 FNA BX W/US GDN 1ST LES: CPT | Performed by: FAMILY MEDICINE

## 2023-11-06 ENCOUNTER — HOSPITAL ENCOUNTER (OUTPATIENT)
Dept: GENERAL RADIOLOGY | Age: 78
Discharge: HOME | End: 2023-11-06
Attending: NURSE PRACTITIONER
Payer: MEDICARE

## 2023-11-06 ENCOUNTER — TELEPHONE (OUTPATIENT)
Dept: FAMILY MEDICINE CLINIC | Facility: CLINIC | Age: 78
End: 2023-11-06

## 2023-11-06 ENCOUNTER — OFFICE VISIT (OUTPATIENT)
Dept: FAMILY MEDICINE CLINIC | Facility: CLINIC | Age: 78
End: 2023-11-06
Payer: MEDICARE

## 2023-11-06 VITALS
DIASTOLIC BLOOD PRESSURE: 60 MMHG | TEMPERATURE: 97 F | HEIGHT: 71 IN | WEIGHT: 209 LBS | RESPIRATION RATE: 18 BRPM | BODY MASS INDEX: 29.26 KG/M2 | SYSTOLIC BLOOD PRESSURE: 122 MMHG | HEART RATE: 74 BPM | OXYGEN SATURATION: 89 %

## 2023-11-06 DIAGNOSIS — R79.81 LOW OXYGEN SATURATION: ICD-10-CM

## 2023-11-06 DIAGNOSIS — R05.1 ACUTE COUGH: ICD-10-CM

## 2023-11-06 DIAGNOSIS — R05.1 ACUTE COUGH: Primary | ICD-10-CM

## 2023-11-06 DIAGNOSIS — R06.2 WHEEZING ON EXPIRATION: ICD-10-CM

## 2023-11-06 PROCEDURE — 71046 X-RAY EXAM CHEST 2 VIEWS: CPT | Performed by: NURSE PRACTITIONER

## 2023-11-06 RX ORDER — PREDNISONE 20 MG/1
TABLET ORAL
Qty: 15 TABLET | Refills: 0 | Status: SHIPPED | OUTPATIENT
Start: 2023-11-06 | End: 2023-11-14

## 2023-11-06 RX ORDER — BENZONATATE 100 MG/1
100 CAPSULE ORAL 2 TIMES DAILY PRN
Qty: 8 CAPSULE | Refills: 0 | Status: SHIPPED | OUTPATIENT
Start: 2023-11-06

## 2023-11-06 NOTE — TELEPHONE ENCOUNTER
----- Message from ALVARO Blanco sent at 11/6/2023  3:43 PM CST -----  CXR shows mild atelectasis of left base, so really need to use those breathing devices. Should stay away from the benzonatate if we can. The inhalers that he is currently taking include all types of what is available in the world of inhalers. Lets see what the prednisone will do for us. If symptoms do not improve with use of prednisone, I'd like them to reach out to pulmonology to see if they want to try any other medications to help manage COPD exacerbations.

## 2023-11-07 ENCOUNTER — TELEPHONE (OUTPATIENT)
Dept: FAMILY MEDICINE CLINIC | Facility: CLINIC | Age: 78
End: 2023-11-07

## 2023-11-07 NOTE — TELEPHONE ENCOUNTER
----- Message from Montserrat Brito MD sent at 11/6/2023  5:25 PM CST -----  Please let patient or caregiver know or leave message that:   Her thyroid biopsies revealed benign nodules.    Thanks

## 2023-11-27 ENCOUNTER — HOSPITAL ENCOUNTER (OUTPATIENT)
Dept: GENERAL RADIOLOGY | Age: 78
Discharge: HOME | End: 2023-11-27
Attending: FAMILY MEDICINE
Payer: MEDICARE

## 2023-11-27 ENCOUNTER — TELEPHONE (OUTPATIENT)
Dept: FAMILY MEDICINE CLINIC | Facility: CLINIC | Age: 78
End: 2023-11-27

## 2023-11-27 ENCOUNTER — OFFICE VISIT (OUTPATIENT)
Dept: FAMILY MEDICINE CLINIC | Facility: CLINIC | Age: 78
End: 2023-11-27
Payer: MEDICARE

## 2023-11-27 VITALS
DIASTOLIC BLOOD PRESSURE: 68 MMHG | OXYGEN SATURATION: 99 % | TEMPERATURE: 98 F | RESPIRATION RATE: 20 BRPM | WEIGHT: 201.25 LBS | SYSTOLIC BLOOD PRESSURE: 114 MMHG | HEART RATE: 77 BPM | BODY MASS INDEX: 28 KG/M2

## 2023-11-27 DIAGNOSIS — M25.561 ACUTE PAIN OF RIGHT KNEE: ICD-10-CM

## 2023-11-27 DIAGNOSIS — E11.9 CONTROLLED TYPE 2 DIABETES MELLITUS WITHOUT COMPLICATION, WITHOUT LONG-TERM CURRENT USE OF INSULIN (HCC): ICD-10-CM

## 2023-11-27 DIAGNOSIS — G89.29 CHRONIC PAIN OF RIGHT KNEE: ICD-10-CM

## 2023-11-27 DIAGNOSIS — G89.29 CHRONIC HIP PAIN, RIGHT: ICD-10-CM

## 2023-11-27 DIAGNOSIS — M25.561 CHRONIC PAIN OF RIGHT KNEE: ICD-10-CM

## 2023-11-27 DIAGNOSIS — G89.29 CHRONIC HIP PAIN, RIGHT: Primary | ICD-10-CM

## 2023-11-27 DIAGNOSIS — M25.551 CHRONIC HIP PAIN, RIGHT: ICD-10-CM

## 2023-11-27 DIAGNOSIS — M25.551 CHRONIC HIP PAIN, RIGHT: Primary | ICD-10-CM

## 2023-11-27 PROBLEM — G47.00 INSOMNIA: Status: ACTIVE | Noted: 2023-11-27

## 2023-11-27 PROBLEM — Q28.3 CONGENITAL ANOMALY OF CEREBROVASCULAR SYSTEM (HCC): Status: ACTIVE | Noted: 2023-11-27

## 2023-11-27 PROBLEM — R26.89 OTHER ABNORMALITIES OF GAIT AND MOBILITY: Status: ACTIVE | Noted: 2023-11-27

## 2023-11-27 PROBLEM — S82.409A CLOSED FRACTURE OF FIBULA: Status: ACTIVE | Noted: 2023-11-27

## 2023-11-27 PROBLEM — Q28.3 CONGENITAL ANOMALY OF CEREBROVASCULAR SYSTEM: Status: ACTIVE | Noted: 2023-11-27

## 2023-11-27 PROCEDURE — 73502 X-RAY EXAM HIP UNI 2-3 VIEWS: CPT | Performed by: FAMILY MEDICINE

## 2023-11-27 PROCEDURE — 99214 OFFICE O/P EST MOD 30 MIN: CPT | Performed by: FAMILY MEDICINE

## 2023-11-27 PROCEDURE — 3074F SYST BP LT 130 MM HG: CPT | Performed by: FAMILY MEDICINE

## 2023-11-27 PROCEDURE — 73562 X-RAY EXAM OF KNEE 3: CPT | Performed by: FAMILY MEDICINE

## 2023-11-27 PROCEDURE — 3078F DIAST BP <80 MM HG: CPT | Performed by: FAMILY MEDICINE

## 2023-11-27 RX ORDER — ALOGLIPTIN 25 MG/1
25 TABLET, FILM COATED ORAL
COMMUNITY
Start: 2023-10-27

## 2023-11-27 RX ORDER — ERGOCALCIFEROL 1.25 MG/1
CAPSULE ORAL
COMMUNITY
Start: 2023-10-02

## 2023-11-27 RX ORDER — BLOOD-GLUCOSE METER
1 EACH MISCELLANEOUS 2 TIMES DAILY
Qty: 200 STRIP | Refills: 0 | Status: SHIPPED | OUTPATIENT
Start: 2023-11-27

## 2023-11-27 RX ORDER — TRAMADOL HYDROCHLORIDE 50 MG/1
50 TABLET ORAL EVERY 6 HOURS PRN
COMMUNITY
Start: 2023-09-12

## 2023-11-27 NOTE — TELEPHONE ENCOUNTER
Rt side hip & knee pain getting worse for 1 week, nothing helping.  Pt dtr & pt asking for apptt w/ pcp to address pain

## 2023-11-27 NOTE — TELEPHONE ENCOUNTER
Per DS Pervis Avers with daughter, v/u     Future Appointments   Date Time Provider Kris Beckman   11/27/2023  1:00 PM Lisseth Null [facility] Primo Johnson

## 2023-11-28 ENCOUNTER — TELEPHONE (OUTPATIENT)
Dept: ORTHOPEDICS CLINIC | Facility: CLINIC | Age: 78
End: 2023-11-28

## 2023-11-28 NOTE — TELEPHONE ENCOUNTER
Patient scheduled via mychart for left hip and left knee pain. Xrays in epic.  Please advise if additional is needed  Future Appointments   Date Time Provider Kris Beckman   1/8/2024 11:40 AM Papa Westbrook MD [facility]

## 2023-12-04 RX ORDER — TRAMADOL HYDROCHLORIDE 50 MG/1
50 TABLET ORAL EVERY 6 HOURS PRN
Qty: 30 TABLET | Refills: 0 | Status: SHIPPED | OUTPATIENT
Start: 2023-12-04 | End: 2024-01-03

## 2023-12-04 NOTE — TELEPHONE ENCOUNTER
Routing to provider per protocol. traMADol 50 MG Oral Tab   Last refilled on 9/12/23 for #? with ? rf. Last labs 9/22/23. Last seen on 11/27/23. PER DAUGHTER, PATIENT TAKING 2 50MG TABLETS A DAY. WOULD LIKE 100MG TAB SENT. Future Appointments   Date Time Provider Kris Beckman   1/8/2024 11:40 AM Chito Lyons MD [facility]          Thank you.

## 2023-12-12 ENCOUNTER — OFFICE VISIT (OUTPATIENT)
Facility: CLINIC | Age: 78
End: 2023-12-12
Payer: MEDICARE

## 2023-12-12 VITALS — HEIGHT: 71 IN | WEIGHT: 208 LBS | BODY MASS INDEX: 29.12 KG/M2

## 2023-12-12 DIAGNOSIS — Z96.652 STATUS POST LEFT KNEE REPLACEMENT: ICD-10-CM

## 2023-12-12 DIAGNOSIS — M16.12 PRIMARY OSTEOARTHRITIS OF LEFT HIP: Primary | ICD-10-CM

## 2023-12-12 PROCEDURE — 99213 OFFICE O/P EST LOW 20 MIN: CPT | Performed by: PHYSICIAN ASSISTANT

## 2023-12-12 PROCEDURE — 3008F BODY MASS INDEX DOCD: CPT | Performed by: PHYSICIAN ASSISTANT

## 2023-12-12 PROCEDURE — 1159F MED LIST DOCD IN RCRD: CPT | Performed by: PHYSICIAN ASSISTANT

## 2023-12-12 PROCEDURE — 1160F RVW MEDS BY RX/DR IN RCRD: CPT | Performed by: PHYSICIAN ASSISTANT

## 2023-12-12 RX ORDER — FERROUS SULFATE 325(65) MG
325 TABLET ORAL
COMMUNITY

## 2023-12-12 NOTE — PROGRESS NOTES
[facility] Progress Note    Subjective: Patient returns to clinic after last being seen by Dr. Alisa Martin on 11/28/2022 to follow-up from left total knee arthroplasty. The patient reports that about 1 month ago, he insidiously began developing pain in the left lateral hip with some radiation to the left lateral thigh. Pain initially onset during physical therapy. Pain worsens with laying directly on the left side as well as increased activity. He has tried ibuprofen, ice, heat, tramadol, and physical therapy stretches without significant relief at this point. No previous injections into the left hip or surgery. Objective: Patient seated comfortably in the exam chair. Internal rotation of the left hip is significantly limited secondary to lateral hip pain. There is no significant tenderness to palpation along the greater trochanteric bursa or IT band. External rotation of the left hip to approximately 30 degrees without any significant pain. Left knee flexion is 5/5 in strength and is nonpainful. Imaging: Radiographs of the left hip recently obtained as well as the left knee personally viewed, independently interpreted and radiology report read. Radiographs of the left knee demonstrate well-maintained alignment of surgical hardware without any acute complications noted. Radiographs left hip demonstrate severe joint space narrowing at the lateral edge of the acetabulum and superior aspect of the femoral head. Assessment/Plan: I discussed with the patient that with regards to his left hip pain radiating down the lateral thigh, I suspect that is secondary to symptomatic radiographically moderate to severe left hip osteoarthritis. I discussed the etiology, natural history, and management options for symptomatic hip osteoarthritis.   I discussed nonsurgical and surgical treatments, with nonsurgical treatments to include anti-inflammatory medications, injections, activity modification, weight loss, low impact exercise and possible therapy. Surgery would be with hip replacement and is an elective operation reserved for when nonsurgical treatments no longer alleviate symptoms sufficiently. I reassured the patient that his knee replacement appears intact at this point in time. I would recommend both diagnostic therapeutic intra-articular hip injection with Dr. Carleen Reis in the near future to both confirm the hip arthritis is contributing to the patient's pain as well as offer some degree of relief. Patient is in agreement to this plan. Will follow-up for his hip injection. His questions were sought and answered satisfactorily. He is happy with the plan will follow advised. X-rays needed at next visit: None      BECKI KaplanC  [address] Orthopedic Surgery    This note was dictated using Dragon software. While it was briefly proofread prior to completion, some grammatical, spelling, and word choice errors due to dictation may still occur.

## 2023-12-18 ENCOUNTER — OFFICE VISIT (OUTPATIENT)
Dept: ORTHOPEDICS CLINIC | Facility: CLINIC | Age: 78
End: 2023-12-18
Payer: MEDICARE

## 2023-12-18 DIAGNOSIS — M16.12 PRIMARY OSTEOARTHRITIS OF LEFT HIP: Primary | ICD-10-CM

## 2023-12-18 PROCEDURE — 20611 DRAIN/INJ JOINT/BURSA W/US: CPT | Performed by: ORTHOPAEDIC SURGERY

## 2023-12-18 PROCEDURE — 1159F MED LIST DOCD IN RCRD: CPT | Performed by: ORTHOPAEDIC SURGERY

## 2023-12-18 PROCEDURE — 1160F RVW MEDS BY RX/DR IN RCRD: CPT | Performed by: ORTHOPAEDIC SURGERY

## 2023-12-18 RX ORDER — TRIAMCINOLONE ACETONIDE 40 MG/ML
40 INJECTION, SUSPENSION INTRA-ARTICULAR; INTRAMUSCULAR ONCE
Status: COMPLETED | OUTPATIENT
Start: 2023-12-18 | End: 2023-12-18

## 2023-12-18 RX ADMIN — TRIAMCINOLONE ACETONIDE 40 MG: 40 INJECTION, SUSPENSION INTRA-ARTICULAR; INTRAMUSCULAR at 14:40:00

## 2023-12-18 NOTE — PROCEDURES
Risks and benefits of hip injection discussed with the patient, with risks including but not limited to pain and swelling at the injection site and/or within the hip joint, infection, elevation in blood pressure and/or glucose levels, facial flushing. After informed consent, the patient's left hip was marked, prepped with topical antiseptic, and locally anesthetized with skin refrigerant followed by injection of 1% lidocaine to create a skin wheal anteriorly at an insertion site a few fingerbreadths lateral to the femoral pulse, along the trajectory of the femoral neck. Next, the area was re-prepped with topical antiseptic, and ultrasound guidance was performed to direct a 20 gauge spinal needle into the hip joint at the junction of the femoral head and neck, after which a mixture of 1mL 40mg/mL Kenalog, 2mL 1% lidocaine and 2mL 0.5% marcaine was injected while visualizing the fluid under ultrasound. Confirmatory imaging was saved to the patient's chart. A band-aid was applied. The patient tolerated the procedure well.     Doug Dubois MD, [address] [place] [address] Orthopedic Surgery  Phone [phone]  Fax [phone]

## 2023-12-29 RX ORDER — FLUTICASONE FUROATE AND VILANTEROL TRIFENATATE 200; 25 UG/1; UG/1
1 POWDER RESPIRATORY (INHALATION) DAILY
Qty: 60 EACH | Refills: 5 | Status: SHIPPED | OUTPATIENT
Start: 2023-12-29

## 2023-12-29 NOTE — TELEPHONE ENCOUNTER
Pt failed refill protocol for the following reasons:    Asthma & COPD Medication Protocol [id number]/29/2023 12:38 AM    Asthma Action Score greater than or equal to 20    AAP/ACT given in last 12 months    Appointment in past 6 or next 3 months            Last refill: 4/19/23  Last appt: 11/27/23  Next appt: Future Appointments   Date Time Provider Kris Karuna   1/8/2024 11:25 AM NAP XR RM2 NAP XRAY [facility] [place]   1/8/2024 11:40 AM Merissa Christine MD [facility]         Forward to Dr. Libia Irizarry, please advise on refills. Thank you.

## 2024-01-08 ENCOUNTER — HOSPITAL ENCOUNTER (OUTPATIENT)
Dept: GENERAL RADIOLOGY | Age: 79
Discharge: HOME | End: 2024-01-08
Attending: ORTHOPAEDIC SURGERY
Payer: MEDICARE

## 2024-01-08 ENCOUNTER — TELEPHONE (OUTPATIENT)
Dept: ORTHOPEDICS CLINIC | Facility: CLINIC | Age: 79
End: 2024-01-08

## 2024-01-08 ENCOUNTER — OFFICE VISIT (OUTPATIENT)
Dept: ORTHOPEDICS CLINIC | Facility: CLINIC | Age: 79
End: 2024-01-08
Payer: MEDICARE

## 2024-01-08 DIAGNOSIS — M16.12 PRIMARY OSTEOARTHRITIS OF LEFT HIP: Primary | ICD-10-CM

## 2024-01-08 DIAGNOSIS — M25.562 LEFT KNEE PAIN, UNSPECIFIED CHRONICITY: ICD-10-CM

## 2024-01-08 PROCEDURE — 73562 X-RAY EXAM OF KNEE 3: CPT | Performed by: ORTHOPAEDIC SURGERY

## 2024-01-08 PROCEDURE — 1159F MED LIST DOCD IN RCRD: CPT | Performed by: ORTHOPAEDIC SURGERY

## 2024-01-08 PROCEDURE — 1160F RVW MEDS BY RX/DR IN RCRD: CPT | Performed by: ORTHOPAEDIC SURGERY

## 2024-01-08 PROCEDURE — 99214 OFFICE O/P EST MOD 30 MIN: CPT | Performed by: ORTHOPAEDIC SURGERY

## 2024-01-08 NOTE — PROGRESS NOTES
SURGERY SCHEDULING SHEET    Jordan Valencia  5/16/1945  [id number]    Procedure: Left posterior cemented total hip arthroplasty    CPT: 93198    Diagnosis: Left hip osteoarthritis    Anesthesia: Choice    Length of Surgery: 2 hours    Disposition: Inpatient    Instruments: Jose EMILY    Assist: If case scheduled on Thurs/Fri, then Shun Dominguez first assist. If case scheduled on Tues, then Rosas Nikunjrito ([phone]) first assist. If Rosas unavailable, then please communicate to Shun Dominguez/Jayson/Aicha to cancel Shun's clinic for that day and have Shun first assist. If Shun unavailable, contact Hemal Carr for first assist. If Rosas and Hemal unavailable, then contact [facility] for first assist.    Pre-op Testing: CBC, CMP, PT/INR, PTT, TYPE AND SCREEN, MRSA/MSSA THROUGH EDW PAT, and HEMOGLOBIN A1C THROUGH EDW PAT    Clearance: MEDICAL/PCP and PULMONARY    Post op: 2 weeks postop appointment with Shun Dominguez    Surgery date specifics: After March 18, 2024    Greg Manuel MD, FAAOS  [facility] Orthopedic Surgery  Phone: [phone]  Fax: [phone]

## 2024-01-08 NOTE — PROGRESS NOTES
[facility] Progress Note    Subjective: Patient returns for his left hip.  He states that the injection performed a few weeks ago gave 2 to 3 days of 50% relief, symptoms then returned.  Pain is activity and lifestyle limiting, worsening.  He is ambulating with a cane in the right hand.  Of note, he saw his pulmonologist a few months ago after being admitted in August for pneumonia and UTI, was discharged home on oxygen to be used at night and with activity.  They report that things have been stable since then, he does occasionally use oxygen at night.  He does report he continues to smoke, he was able to quit around the time of his knee surgery but has restarted since then.  He also has intermittent urinary symptoms, this is unchanged from when he had his knee replacement surgery.    Objective: Patient occasionally breathes with pursed lips.  He is ambulating with an antalgic gait favoring the left lower extremity, using a cane in the right hand.      Labs: Hemoglobin A1c 7.8 in August 2023      Assessment/Plan: 78-year-old male with symptomatic radiographic moderate to severe left hip osteoarthritis.  The patient has reasonably attempted nonsurgical treatments as mentioned above and remains limited in activities of daily living secondary to pain from hip arthritis.  I discussed risks and benefits of surgery, including but not limited to infection, blood clots, fracture, bleeding and need for blood transfusion, injury to blood vessels and nerves, loosening or failure of components, instability/dislocation, leg length discrepancy, continued pain, need for further intervention or revision surgery.  We discussed the number of increased medical and surgical complications given some of his comorbidities, including increased risk of venous thromboembolic disease, wound drainage and infection, need for further surgery including washout, potential infection of ipsilateral knee replacement if he develops an infection  in his hip replacement and what this could entail, as well as pulmonary complications and instability given his spine disease.  The patient understands this discussion, understands that he is at an increased risk of complications, but states that he cannot continue with the hip pain the way it is, potential benefits exceed risks and elects to proceed with hip replacement surgery.  We would plan for posterior approach with a cemented stem.  He will need pulmonology evaluation and clearance prior to surgery in addition to his primary care doctor.  We did discuss the high importance of smoking cessation or minimization around the time of surgery which he states he will be able to do.  We did discuss additionally the importance of optimizing diabetic control, as his last A1c over 4 months ago was elevated.  We would recheck a hemoglobin A1c at the time of surgery.  It will need to be at least 3 months following injection due to the risk of infection.    Greg Manuel MD, FAAOS  [facility] Orthopedic Surgery  Phone [phone]  Fax [phone]

## 2024-01-08 NOTE — TELEPHONE ENCOUNTER
Date of Surgery: 5/28/24         Post Op Appt: 6/12/24 @ 930AM     Case ID: [id number]     Notes: Note surgery needs to be at least March 18 or later due to recent injection.  He does need pulmonary and medical clearance.  He does need hemoglobin A1c.         SURGERY SCHEDULING SHEET     Jordan Valencia  5/16/1945  [id number]     Procedure: Left posterior cemented total hip arthroplasty     CPT: 48173     Diagnosis: Left hip osteoarthritis     Anesthesia: Choice     Length of Surgery: 2 hours     Disposition: Inpatient     Instruments: Jose EMILY     Assist: If case scheduled on Thurs/Fri, then Shun Dominguez first assist. If case scheduled on Tues, then Rosas Pizano ([phone]) first assist. If Rosas unavailable, then please communicate to Shun Dominguez/Jayson/iAcha to cancel Shun's clinic for that day and have Shun first assist. If Shun unavailable, contact Hemal Carr for first assist. If Rosas and Hemal unavailable, then contact [facility] for first assist.     Pre-op Testing: CBC, CMP, PT/INR, PTT, TYPE AND SCREEN, MRSA/MSSA THROUGH EDW PAT, and HEMOGLOBIN A1C THROUGH EDW PAT     Clearance: MEDICAL/PCP and PULMONARY     Post op: 2 weeks postop appointment with Shun Dominguez     Surgery date specifics: After March 18, 2024     Greg Manuel MD, FAAOS  [facility] Orthopedic Surgery  Phone: [phone]  Fax: [phone]

## 2024-01-09 ENCOUNTER — TELEPHONE (OUTPATIENT)
Dept: FAMILY MEDICINE CLINIC | Facility: CLINIC | Age: 79
End: 2024-01-09

## 2024-01-09 NOTE — TELEPHONE ENCOUNTER
Letter mailed to patient reminding them they have outstanding orders.  Lab Frequency Next Occurrence   Hemoglobin A1C [E] Once 11/30/2023

## 2024-01-10 RX ORDER — ALBUTEROL SULFATE 90 UG/1
2 AEROSOL, METERED RESPIRATORY (INHALATION) EVERY 4 HOURS PRN
Qty: 2 EACH | Refills: 2 | Status: SHIPPED | OUTPATIENT
Start: 2024-01-10

## 2024-01-10 RX ORDER — LANCETS 33 GAUGE
1 EACH MISCELLANEOUS 2 TIMES DAILY
Qty: 200 EACH | Refills: 1 | Status: SHIPPED | OUTPATIENT
Start: 2024-01-10

## 2024-01-11 ENCOUNTER — TELEPHONE (OUTPATIENT)
Dept: FAMILY MEDICINE CLINIC | Facility: CLINIC | Age: 79
End: 2024-01-11

## 2024-01-11 DIAGNOSIS — R39.9 UTI SYMPTOMS: Primary | ICD-10-CM

## 2024-01-11 PROCEDURE — 81003 URINALYSIS AUTO W/O SCOPE: CPT | Performed by: FAMILY MEDICINE

## 2024-01-11 NOTE — TELEPHONE ENCOUNTER
PER DAUGHTER , HAVING URINARY SXS X 1 WEEK.    PT ADV BURNING W/URINATION AND FREQUENCY.    OK TO BRING IN URINE FOR DIP AND CULTURE.    PLEASE ADV    THANK YOU

## 2024-01-15 ENCOUNTER — NURSE ONLY (OUTPATIENT)
Dept: FAMILY MEDICINE CLINIC | Facility: CLINIC | Age: 79
End: 2024-01-15
Payer: MEDICARE

## 2024-01-15 DIAGNOSIS — R30.0 DYSURIA: Primary | ICD-10-CM

## 2024-01-15 DIAGNOSIS — R39.9 UTI SYMPTOMS: ICD-10-CM

## 2024-01-15 DIAGNOSIS — Z22.39 PSEUDOMONAS AERUGINOSA COLONIZATION: ICD-10-CM

## 2024-01-15 LAB
BILIRUBIN: NEGATIVE
GLUCOSE (URINE DIPSTICK): >=1000 MG/DL
KETONES (URINE DIPSTICK): NEGATIVE MG/DL
MULTISTIX LOT#: ABNORMAL NUMERIC
NITRITE, URINE: POSITIVE
PH, URINE: 5.5 (ref 4.5–8)
PROTEIN (URINE DIPSTICK): >=300 MG/DL
SPECIFIC GRAVITY: 1.01 (ref 1–1.03)
UROBILINOGEN,SEMI-QN: 0.2 MG/DL (ref 0–1.9)

## 2024-01-15 PROCEDURE — 87186 SC STD MICRODIL/AGAR DIL: CPT | Performed by: FAMILY MEDICINE

## 2024-01-15 PROCEDURE — 87086 URINE CULTURE/COLONY COUNT: CPT | Performed by: FAMILY MEDICINE

## 2024-01-15 PROCEDURE — 87077 CULTURE AEROBIC IDENTIFY: CPT | Performed by: FAMILY MEDICINE

## 2024-01-15 NOTE — PROGRESS NOTES
Pt is in office with dysuria, frequency to provide specimen for testing      Urine dip preformed in office and sent for culture    Pt tolerated and was sent home in stable condition. Wait to start ABX therapy until culture is back- daughter verbalized understanding

## 2024-01-16 ENCOUNTER — TELEPHONE (OUTPATIENT)
Dept: FAMILY MEDICINE CLINIC | Facility: CLINIC | Age: 79
End: 2024-01-16

## 2024-01-16 RX ORDER — CEFDINIR 300 MG/1
300 CAPSULE ORAL 2 TIMES DAILY
Qty: 14 CAPSULE | Refills: 0 | Status: SHIPPED | OUTPATIENT
Start: 2024-01-16 | End: 2024-01-23

## 2024-01-16 NOTE — TELEPHONE ENCOUNTER
Daughter is calling to More confusion- feeling weaker- Daughter is wanting to know if we can get something started?    Sugars this morning were 308    RN spoke with Dr. Julien- can start on Cefdinir, patient has been on that in the past.     Rn Spoke to daughter and advised ofmeds being called in- will contact if culture comes back and medication nedds to be changed    [place] in [place]

## 2024-01-27 ENCOUNTER — PATIENT MESSAGE (OUTPATIENT)
Dept: FAMILY MEDICINE CLINIC | Facility: CLINIC | Age: 79
End: 2024-01-27

## 2024-01-27 DIAGNOSIS — G89.29 CHRONIC HIP PAIN, RIGHT: Primary | ICD-10-CM

## 2024-01-27 DIAGNOSIS — M25.551 CHRONIC HIP PAIN, RIGHT: Primary | ICD-10-CM

## 2024-01-27 RX ORDER — TRAMADOL HYDROCHLORIDE 50 MG/1
TABLET ORAL
Qty: 60 TABLET | Refills: 0 | Status: SHIPPED | OUTPATIENT
Start: 2024-01-27

## 2024-01-27 NOTE — TELEPHONE ENCOUNTER
From: Jordan Valencia  To: Maco Julien  Sent: 1/27/2024 7:26 AM CST  Subject: Pain medication     Dr. Julien can you please refill dad's tramadol to [facility]. His hip surgery is not until May.  Thanks  Osiris

## 2024-02-15 ENCOUNTER — TELEPHONE (OUTPATIENT)
Dept: FAMILY MEDICINE CLINIC | Facility: CLINIC | Age: 79
End: 2024-02-15

## 2024-02-15 NOTE — TELEPHONE ENCOUNTER
Noted- pt has appt next week  Future Appointments   Date Time Provider Department Center   2/20/2024 11:00 AM Maco Julien DO [facility]   6/12/2024  9:30 AM Shun Dominguez PA-C [facility]

## 2024-02-15 NOTE — TELEPHONE ENCOUNTER
Adeline with [facility] called with a courtesy call stating Pt discharged 1 week ago from hospital. - FYMORGAN they thought PCP with [facility] - did not correct as she was not calling on a secure line - just took info as she said she couldn't fax.

## 2024-02-20 ENCOUNTER — OFFICE VISIT (OUTPATIENT)
Dept: FAMILY MEDICINE CLINIC | Facility: CLINIC | Age: 79
End: 2024-02-20
Payer: MEDICARE

## 2024-02-20 VITALS
WEIGHT: 202.19 LBS | RESPIRATION RATE: 16 BRPM | BODY MASS INDEX: 28 KG/M2 | TEMPERATURE: 98 F | OXYGEN SATURATION: 92 % | DIASTOLIC BLOOD PRESSURE: 72 MMHG | SYSTOLIC BLOOD PRESSURE: 144 MMHG | HEART RATE: 69 BPM

## 2024-02-20 DIAGNOSIS — J41.0 SMOKERS' COUGH (HCC): Chronic | ICD-10-CM

## 2024-02-20 DIAGNOSIS — J18.9 PNEUMONIA OF LEFT LOWER LOBE DUE TO INFECTIOUS ORGANISM: ICD-10-CM

## 2024-02-20 DIAGNOSIS — R79.81 LOW OXYGEN SATURATION: ICD-10-CM

## 2024-02-20 DIAGNOSIS — J98.01 BRONCHOSPASM: Primary | ICD-10-CM

## 2024-02-20 DIAGNOSIS — J44.1 CHRONIC OBSTRUCTIVE PULMONARY DISEASE WITH ACUTE EXACERBATION (HCC): ICD-10-CM

## 2024-02-20 PROCEDURE — 99495 TRANSJ CARE MGMT MOD F2F 14D: CPT | Performed by: FAMILY MEDICINE

## 2024-02-20 PROCEDURE — 94640 AIRWAY INHALATION TREATMENT: CPT | Performed by: FAMILY MEDICINE

## 2024-02-20 RX ORDER — IPRATROPIUM BROMIDE AND ALBUTEROL SULFATE 2.5; .5 MG/3ML; MG/3ML
3 SOLUTION RESPIRATORY (INHALATION) ONCE
Status: COMPLETED | OUTPATIENT
Start: 2024-02-20 | End: 2024-02-20

## 2024-02-20 RX ORDER — IPRATROPIUM BROMIDE AND ALBUTEROL SULFATE 2.5; .5 MG/3ML; MG/3ML
3 SOLUTION RESPIRATORY (INHALATION) EVERY 6 HOURS PRN
Qty: 9 ML | Refills: 1 | Status: SHIPPED | OUTPATIENT
Start: 2024-02-20

## 2024-02-20 RX ORDER — CEFEPIME HYDROCHLORIDE 2 G/1
INJECTION, POWDER, FOR SOLUTION INTRAVENOUS
COMMUNITY
Start: 2024-02-13

## 2024-02-20 RX ADMIN — IPRATROPIUM BROMIDE AND ALBUTEROL SULFATE 3 ML: 2.5; .5 SOLUTION RESPIRATORY (INHALATION) at 11:13:00

## 2024-02-20 NOTE — PROGRESS NOTES
Subjective:   Jordan Valencia is a 78 year old male who presents for hospital follow up.   He was discharged from Inpatient hospital,  [facility]  to Home   Admit Date: 2/4/24  Discharge Date: 2/12/24  Hospital Discharge Diagnosis: LLL pneumonia, exacerbation COPD, Dyspnea    Interactive contact within 2 business days post discharge first initiated on Date: 2/7/24    During the visit, the following was completed:  Obtained and reviewed discharge summary, continuity of care documents, and Hospitalist notes  Reviewed Labs (CBC, CMP), Labs (Cardiac markers), Labs (Microbiology), and US radiology results    HPI: Handy was having issues with a cough that got progressively worse over the course of a few days.  He was SOB, coughing and wheezing, febrile and had production as well. Was admitted to [facility] for a week has been home for a bout a week, was feeling better, but still coughing , here today cough is continuous, denies fever or chills, daughter notes his cough is  worse at night. Has not used the nebulizer for awhile. Just had the PICC removed after getting 10 days of IV abx for Pseudomonas    History/Other:   Current Medications:  Medication Reconciliation:  I am aware of an inpatient discharge within the last 30 days.  The discharge medication list has been reconciled with the patient's current medication list and reviewed by me.  See medication list for additions of new medication, and changes to current doses of medications and discontinued medications.  Outpatient Medications Marked as Taking for the 2/20/24 encounter (Office Visit) with Maco Julien DO   Medication Sig    ceFEPIme 2 g Intravenous Recon Soln     ipratropium-albuterol 0.5-2.5 (3) MG/3ML Inhalation Solution Take 3 mL by nebulization every 6 (six) hours as needed.    traMADol 50 MG Oral Tab Take 1-2 tabs every 8 hours as needed for hip pain    albuterol 108 (90 Base) MCG/ACT Inhalation Aero Soln Inhale 2 puffs into the lungs every 4 (four)  hours as needed.    Lancets (ONETOUCH DELICA PLUS [id number]) Does not apply Misc 1 lancet  by Finger stick route 2 (two) times daily. Dx E11.9 non insulin dependent    BREO ELLIPTA 200-25 MCG/ACT Inhalation Aerosol Powder, Breath Activated INHALE 1 PUFF INTO THE LUNGS DAILY    Ferrous Sulfate 325 (65 Fe) MG Oral Tab Take 1 tablet (325 mg total) by mouth daily with breakfast. Every other day    Alogliptin Benzoate 25 MG Oral Tab 25 mg.    Empagliflozin 25 MG Oral Tab Take 1 tablet by mouth daily.    ergocalciferol 1.25 MG (50085 UT) Oral Cap     Glucose Blood (ONETOUCH VERIO) In Vitro Strip 1 Lancet by Finger stick route 2 (two) times daily.    ALBUTEROL (2.5 MG/3ML) 0.083% Inhalation Nebu Soln INHALE 3 ML BY NEBULIZATION EVERY 4 HOURS AS NEEDED FOR WHEEZING    rOPINIRole 5 MG Oral Tab Take 1 tablet (5 mg total) by mouth nightly.    ipratropium-albuterol (COMBIVENT RESPIMAT)  MCG/ACT Inhalation Aero Soln Inhale 1 spray into the lungs 2 (two) times daily.    omeprazole 20 MG Oral Capsule Delayed Release Take 1 capsule (20 mg total) by mouth daily.    cyanocobalamin 100 MCG Oral Tab Take 1 tablet orally 3 times weekly    aspirin 81 MG Oral Tab EC Take 1 tablet (81 mg total) by mouth daily.    finasteride 5 MG Oral Tab Take 1 tablet (5 mg total) by mouth daily.    metoprolol succinate 25 MG Oral Tablet 24 Hr Take 0.5 tablets (12.5 mg total) by mouth daily.    cholecalciferol 25 MCG (1000 UT) Oral Tab Take 1 tablet (1,000 Units total) by mouth daily.    Blood Glucose Monitoring Suppl (ONETOUCH VERIO REFLECT) w/Device Does not apply Kit 1 lancet by Finger stick route 2 (two) times daily. Please send what is covered under patient formulary. DX  E11.9 DM2 controlled w/o insulin    clonazePAM 0.5 MG Oral Tab Take 1 tablet (0.5 mg total) by mouth nightly.    atorvastatin 40 MG Oral Tab Take 1.5 tablets (60 mg total) by mouth daily.    Omega-3 1000 MG Oral Cap Take 1 capsule by mouth daily.    tamsulosin (FLOMAX) cap  Take 1 capsule (0.4 mg total) by mouth every evening.    rOPINIRole HCl 5 MG Oral Tab Take 0.5 tablets (2.5 mg total) by mouth in the morning and 0.5 tablets (2.5 mg total) before bedtime. 2.5mg 8am and 2pm .    Melatonin 10 MG Oral Tab Take 2 tablets by mouth nightly.       Review of Systems:  GENERAL: weight stable, energy decreased, no sweating  SKIN: denies any unusual skin lesions  EYES: denies blurred vision or double vision  HEENT: denies nasal congestion, sinus pain or ST  LUNGS:  shortness of breath with exertion, coughing actively in the office  CARDIOVASCULAR: denies chest pain on exertion or palpitations  GI: denies abdominal pain, denies heartburn, denies diarrhea  MUSCULOSKELETAL: denies pain, normal range of motion of extremities  NEURO: denies headaches, denies dizziness, denies weakness  PSYCHE: denies depression or anxiety  HEMATOLOGIC: denies hx of anemia, or bruising, denies bleeding  ENDOCRINE: denies thyroid history  ALL/ASTHMA: denies hx of allergy or asthma    Objective:   No LMP for male patient.  Estimated body mass index is 28.2 kg/m² as calculated from the following:    Height as of 12/12/23: 5' 11\" (1.803 m).    Weight as of this encounter: 202 lb 3.2 oz (91.7 kg).   /72   Pulse 69   Temp 98.2 °F (36.8 °C)   Resp 16   Wt 202 lb 3.2 oz (91.7 kg)   SpO2 92%   BMI 28.20 kg/m²    GENERAL: well developed, well nourished, in no apparent distress  SKIN: no rashes, no suspicious lesions  HEENT: atraumatic, normocephalic, ears and throat are clear  EYES: PERRLA, EOMI, conjunctiva are clear  NECK: supple, no adenopathy, no bruits  CHEST: no chest tenderness  LUNGS: inspiratory effort diminished and tight, ? Crackles left base  CARDIO: RRR without murmur  GI: good BS's, no masses, HSM or tenderness  MUSCULOSKELETAL: back is not tender, FROM of the extremities  EXTREMITIES: no cyanosis, clubbing or edema  NEURO: Oriented times three, cranial nerves are intact, motor and sensory are  grossly intact    Assessment & Plan:   1. Bronchospasm (Primary)  -     Ipratropium-Albuterol  2. Pneumonia of left lower lobe due to infectious organism  -     Ipratropium-Albuterol  3. Smokers' cough (HCC)  4. Chronic obstructive pulmonary disease with acute exacerbation (HCC)  5. Low oxygen saturation  Other orders  -     Ipratropium-Albuterol; Take 3 mL by nebulization every 6 (six) hours as needed.  Dispense: 9 mL; Refill: 1  Was given nebulizer treatment with good improvement of air movement, and resolution of wheezing        Return in 6 weeks (on 4/2/2024).

## 2024-02-27 ENCOUNTER — TELEPHONE (OUTPATIENT)
Dept: FAMILY MEDICINE CLINIC | Facility: CLINIC | Age: 79
End: 2024-02-27

## 2024-02-27 NOTE — TELEPHONE ENCOUNTER
Woke up sick this morning    101 fever  Cough, dry and tight, non-productive  Sounds like there is stuff but nothing coming up      Fairly weak this morning    Covid test negative    Please adv  Thank you

## 2024-03-08 ENCOUNTER — MED REC SCAN ONLY (OUTPATIENT)
Dept: FAMILY MEDICINE CLINIC | Facility: CLINIC | Age: 79
End: 2024-03-08

## 2024-03-08 DIAGNOSIS — E11.9 CONTROLLED TYPE 2 DIABETES MELLITUS WITHOUT COMPLICATION, WITHOUT LONG-TERM CURRENT USE OF INSULIN (HCC): ICD-10-CM

## 2024-03-08 RX ORDER — BLOOD SUGAR DIAGNOSTIC
1 STRIP MISCELLANEOUS 2 TIMES DAILY
Qty: 200 STRIP | Refills: 1 | Status: SHIPPED | OUTPATIENT
Start: 2024-03-08

## 2024-03-21 ENCOUNTER — OFFICE VISIT (OUTPATIENT)
Dept: FAMILY MEDICINE CLINIC | Facility: CLINIC | Age: 79
End: 2024-03-21
Payer: MEDICARE

## 2024-03-21 VITALS
DIASTOLIC BLOOD PRESSURE: 63 MMHG | OXYGEN SATURATION: 96 % | WEIGHT: 188 LBS | BODY MASS INDEX: 26.32 KG/M2 | HEART RATE: 63 BPM | RESPIRATION RATE: 16 BRPM | HEIGHT: 71 IN | SYSTOLIC BLOOD PRESSURE: 122 MMHG

## 2024-03-21 DIAGNOSIS — J69.0 ASPIRATION PNEUMONIA OF RIGHT LOWER LOBE, UNSPECIFIED ASPIRATION PNEUMONIA TYPE (HCC): ICD-10-CM

## 2024-03-21 DIAGNOSIS — R26.81 GAIT INSTABILITY: ICD-10-CM

## 2024-03-21 DIAGNOSIS — J18.9 RECURRENT PNEUMONIA: ICD-10-CM

## 2024-03-21 DIAGNOSIS — M25.562 CHRONIC PAIN OF LEFT KNEE: ICD-10-CM

## 2024-03-21 DIAGNOSIS — G89.29 CHRONIC PAIN OF LEFT KNEE: ICD-10-CM

## 2024-03-21 DIAGNOSIS — S01.311A: Primary | ICD-10-CM

## 2024-03-21 PROCEDURE — 99214 OFFICE O/P EST MOD 30 MIN: CPT | Performed by: FAMILY MEDICINE

## 2024-03-21 RX ORDER — PREDNISONE 5 MG/1
5 TABLET ORAL
COMMUNITY
Start: 2024-03-13

## 2024-03-21 RX ORDER — AMOXICILLIN AND CLAVULANATE POTASSIUM 875; 125 MG/1; MG/1
1 TABLET, FILM COATED ORAL 2 TIMES DAILY
COMMUNITY
Start: 2024-03-13

## 2024-03-21 NOTE — PROGRESS NOTES
Jordan Valencia is a 78 year old male.  HPI:   Handy was admitted to [facility] for an extended period of time and transferred to [facility],  HE WAS FOUND TO HAVE RSV  had a bronchoscopy that showed a piece of tomato in his lung, that they were able  to suction out, AND HIS CONDITION SEEMINGLY IMPROVED REALTIVELY QUICKLY. He was released home and up the other night and rolled over and fell out of bed  and lacerated his ear, took 20 sutures to close. No other changes in meds, he had a negative swallow study while he was there. No other changes in meds, he is using O2 at night only has some breakdown in the nasal cavity from the nasal cannula. He is feeling better , breathing is better, ambulating slowly with a rolling walker , still has hip issues,  Current Outpatient Medications   Medication Sig Dispense Refill    predniSONE 5 MG Oral Tab Take 1 tablet (5 mg total) by mouth. USE AS DIRECTED      amoxicillin clavulanate 875-125 MG Oral Tab Take 1 tablet by mouth 2 (two) times daily.      Glucose Blood (ONETOUCH VERIO) In Vitro Strip USE TO TEST TWICE DAILY 200 strip 1    ipratropium-albuterol 0.5-2.5 (3) MG/3ML Inhalation Solution Take 3 mL by nebulization every 6 (six) hours as needed. 9 mL 1    traMADol 50 MG Oral Tab Take 1-2 tabs every 8 hours as needed for hip pain 60 tablet 0    albuterol 108 (90 Base) MCG/ACT Inhalation Aero Soln Inhale 2 puffs into the lungs every 4 (four) hours as needed. 2 each 2    Lancets (ONETOUCH DELICA PLUS [id number]) Does not apply Misc 1 lancet  by Finger stick route 2 (two) times daily. Dx E11.9 non insulin dependent 200 each 1    BREO ELLIPTA 200-25 MCG/ACT Inhalation Aerosol Powder, Breath Activated INHALE 1 PUFF INTO THE LUNGS DAILY 60 each 5    Ferrous Sulfate 325 (65 Fe) MG Oral Tab Take 1 tablet (325 mg total) by mouth daily with breakfast. Every other day      Alogliptin Benzoate 25 MG Oral Tab 25 mg.      Empagliflozin 25 MG Oral Tab Take 1 tablet by mouth daily.       ergocalciferol 1.25 MG (69111 UT) Oral Cap       ALBUTEROL (2.5 MG/3ML) 0.083% Inhalation Nebu Soln INHALE 3 ML BY NEBULIZATION EVERY 4 HOURS AS NEEDED FOR WHEEZING 75 mL 3    ipratropium-albuterol (COMBIVENT RESPIMAT)  MCG/ACT Inhalation Aero Soln Inhale 1 spray into the lungs 2 (two) times daily.      omeprazole 20 MG Oral Capsule Delayed Release Take 1 capsule (20 mg total) by mouth daily.      aspirin 81 MG Oral Tab EC Take 1 tablet (81 mg total) by mouth daily.      finasteride 5 MG Oral Tab Take 1 tablet (5 mg total) by mouth daily.      metoprolol succinate 25 MG Oral Tablet 24 Hr Take 0.5 tablets (12.5 mg total) by mouth daily.      cholecalciferol 25 MCG (1000 UT) Oral Tab Take 1 tablet (1,000 Units total) by mouth daily.      Blood Glucose Monitoring Suppl (ONETOUCH VERIO REFLECT) w/Device Does not apply Kit 1 lancet by Finger stick route 2 (two) times daily. Please send what is covered under patient formulary. DX  E11.9 DM2 controlled w/o insulin 1 kit 0    clonazePAM 0.5 MG Oral Tab Take 1 tablet (0.5 mg total) by mouth nightly.      atorvastatin 40 MG Oral Tab Take 1.5 tablets (60 mg total) by mouth daily.      Omega-3 1000 MG Oral Cap Take 1 capsule by mouth daily.      tamsulosin (FLOMAX) cap Take 1 capsule (0.4 mg total) by mouth every evening.      rOPINIRole HCl 5 MG Oral Tab Take 0.5 tablets (2.5 mg total) by mouth in the morning and 0.5 tablets (2.5 mg total) before bedtime. 2.5mg 8am and 2pm .      Melatonin 10 MG Oral Tab Take 2 tablets by mouth nightly.        Past Medical History:   Diagnosis Date    COPD (chronic obstructive pulmonary disease) ([facility])     no O2    Diabetes ([facility])     High cholesterol     Parkinson disease     PONV (postoperative nausea and vomiting)     Visual impairment     reading glasses      Social History:  Social History     Socioeconomic History    Marital status:    Tobacco Use    Smoking status: Every Day     Packs/day: 0.50     Years: 60.00      Additional pack years: 0.00     Total pack years: 30.00     Types: Cigarettes     Start date: 2/5/2021     Last attempt to quit: 2/6/2021     Years since quitting: 3.1    Smokeless tobacco: Never    Tobacco comments:     Updated 12/12/23   Vaping Use    Vaping Use: Never used   Substance and Sexual Activity    Alcohol use: Not Currently     Comment: occasional    Drug use: Not Currently        REVIEW OF SYSTEMS:   GENERAL HEALTH: feels better, still weak  HEENT: laceration of the right ear, after falling out of bed  SKIN: denies any unusual skin lesions or rashes  RESPIRATORY: denies shortness of breath with exertion  CARDIOVASCULAR: denies chest pain on exertion  GI: denies abdominal pain and denies heartburn  NEURO: denies headaches  MUSC: hip pain , HX of OA  EXAM:   /63 (BP Location: Right arm, Patient Position: Sitting)   Pulse 63   Resp 16   Ht 5' 11\" (1.803 m)   Wt 188 lb (85.3 kg)   SpO2 96%   BMI 26.22 kg/m²   GENERAL: well developed, well nourished,in no apparent distress  SKIN: no rashes,no suspicious lesions  HEENT: atraumatic, normocephalic,ears  show a horizontal laceration of the right auricle, appears well approximated sutures in place there is edema present, and some bleeding along the laceration. and throat are clear, has some breakdown over the medial nostrils  NECK: supple,no adenopathy,no bruits  LUNGS: clear to auscultation, diminished BS bilaterally no RRW,   CARDIO: RRR without murmur  GI: good BS's,no masses, HSM or tenderness  EXTREMITIES: no cyanosis, clubbing or edema, ambulating with a rolling walker.     ASSESSMENT AND PLAN:     Encounter Diagnoses   Name Primary?    Laceration of auricle of ear, right, initial encounter Yes    Gait instability     Chronic pain of left knee     Aspiration pneumonia of right lower lobe, unspecified aspiration pneumonia type (HCC)     Recurrent pneumonia        LETS KEEP ALL MEDS THE SAME, WE TALKED ABOUT CHEWING OUR FOOD THOROUGHLY, AND  TAKING OUR TIME EATING. CAN USE SOME TRIPLE ABX OINTMENT ON THE NOSTRIL LESION, TID FOR 5-7 DAYS, CONTINUE ROLLING WALKER, FOLLOW UP WITH ENT FOR EAR LACERATION, PULMONOLOGY NEXT WEEK POST BROCH      Imaging & Consults:  None     The patient indicates understanding of these issues and agrees to the plan.  The patient is asked to return in in 4 weeks for preop.

## 2024-03-22 ENCOUNTER — HOME HEALTH CHARGES (OUTPATIENT)
Dept: FAMILY MEDICINE CLINIC | Facility: CLINIC | Age: 79
End: 2024-03-22

## 2024-03-22 DIAGNOSIS — J44.9 CHRONIC OBSTRUCTIVE PULMONARY DISEASE, UNSPECIFIED COPD TYPE (HCC): Primary | ICD-10-CM

## 2024-04-12 ENCOUNTER — TELEPHONE (OUTPATIENT)
Dept: FAMILY MEDICINE CLINIC | Facility: CLINIC | Age: 79
End: 2024-04-12

## 2024-04-12 ENCOUNTER — MED REC SCAN ONLY (OUTPATIENT)
Dept: FAMILY MEDICINE CLINIC | Facility: CLINIC | Age: 79
End: 2024-04-12

## 2024-04-12 ENCOUNTER — NURSE ONLY (OUTPATIENT)
Dept: FAMILY MEDICINE CLINIC | Facility: CLINIC | Age: 79
End: 2024-04-12
Payer: MEDICARE

## 2024-04-12 DIAGNOSIS — R39.9 UTI SYMPTOMS: Primary | ICD-10-CM

## 2024-04-12 DIAGNOSIS — R39.9 UTI SYMPTOMS: ICD-10-CM

## 2024-04-12 LAB
BILIRUBIN: NEGATIVE
GLUCOSE (URINE DIPSTICK): >=1000 MG/DL
KETONES (URINE DIPSTICK): NEGATIVE MG/DL
MULTISTIX LOT#: ABNORMAL NUMERIC
NITRITE, URINE: NEGATIVE
PH, URINE: 6 (ref 4.5–8)
PROTEIN (URINE DIPSTICK): 100 MG/DL
SPECIFIC GRAVITY: 1.02 (ref 1–1.03)
UROBILINOGEN,SEMI-QN: 0.2 MG/DL (ref 0–1.9)

## 2024-04-12 PROCEDURE — 87086 URINE CULTURE/COLONY COUNT: CPT | Performed by: FAMILY MEDICINE

## 2024-04-12 NOTE — TELEPHONE ENCOUNTER
Discussed with Dr. Bernal regarding note below - please place urine dip and urine culture orders    Orders placed    Please schedule NV for pt. Thank you

## 2024-04-12 NOTE — PROGRESS NOTES
Patient presented for lab work ordered by . One lama tubes drawn with a urine straw. Pt tolerated procedure well and left in stable condition.      Urine dip was done in office. Urine also collected for urine culture.

## 2024-04-12 NOTE — TELEPHONE ENCOUNTER
DAUGHTER CALLED AND ADV THAT PT HAS HAD INCREASED BURNING W/URINATION AND HAVING SOME INCONTINENCE.  GAIT IS A LITTLE OFF. GOING ON FOR ABOUT 2 DAYS NOW    WOULD LIKE TO KNOW IF  CAN PLACE URINE CULTURE?    PLEASE ADV    THANK YOU

## 2024-04-18 LAB
AMB EXT CHOLESTEROL, TOTAL: 147 MG/DL
AMB EXT CREATININE, URINE: 65 MG/DL
AMB EXT EGFR NON-AA: 88
AMB EXT HDL CHOLESTEROL: 34 MG/DL
AMB EXT HEMOGLOBIN: 8.2
AMB EXT LDL CHOLESTEROL, DIRECT: 71 MG/DL
AMB EXT TRIGLYCERIDES: 211 MG/DL
AMB EXT TSH: 2.78 MIU/ML

## 2024-04-19 PROBLEM — B33.8 RESPIRATORY SYNCYTIAL VIRUS: Status: ACTIVE | Noted: 2024-02-29

## 2024-04-19 PROBLEM — J96.01 ACUTE HYPOXEMIC RESPIRATORY FAILURE (HCC): Status: ACTIVE | Noted: 2024-02-29

## 2024-04-19 PROBLEM — J96.01 ACUTE HYPOXEMIC RESPIRATORY FAILURE (HCC): Status: ACTIVE | Noted: 2024-01-01

## 2024-04-19 PROBLEM — B33.8 RESPIRATORY SYNCYTIAL VIRUS: Status: ACTIVE | Noted: 2024-01-01

## 2024-04-19 RX ORDER — GLIPIZIDE 10 MG/1
10 TABLET ORAL
COMMUNITY

## 2024-04-25 ENCOUNTER — OFFICE VISIT (OUTPATIENT)
Dept: FAMILY MEDICINE CLINIC | Facility: CLINIC | Age: 79
End: 2024-04-25
Payer: MEDICARE

## 2024-04-25 VITALS
WEIGHT: 186.13 LBS | OXYGEN SATURATION: 91 % | TEMPERATURE: 97 F | SYSTOLIC BLOOD PRESSURE: 108 MMHG | BODY MASS INDEX: 26.95 KG/M2 | HEART RATE: 78 BPM | DIASTOLIC BLOOD PRESSURE: 56 MMHG | HEIGHT: 69.5 IN | RESPIRATION RATE: 20 BRPM

## 2024-04-25 DIAGNOSIS — M54.42 CHRONIC MIDLINE LOW BACK PAIN WITH BILATERAL SCIATICA: ICD-10-CM

## 2024-04-25 DIAGNOSIS — R26.89 OTHER ABNORMALITIES OF GAIT AND MOBILITY: ICD-10-CM

## 2024-04-25 DIAGNOSIS — Q61.02 MULTIPLE RENAL CYSTS: ICD-10-CM

## 2024-04-25 DIAGNOSIS — E11.9 DIABETES MELLITUS TYPE 2, NONINSULIN DEPENDENT (HCC): ICD-10-CM

## 2024-04-25 DIAGNOSIS — J43.2 CENTRILOBULAR EMPHYSEMA (HCC): ICD-10-CM

## 2024-04-25 DIAGNOSIS — R13.10 DYSPHAGIA, UNSPECIFIED TYPE: ICD-10-CM

## 2024-04-25 DIAGNOSIS — Z99.81 CHRONIC RESPIRATORY FAILURE WITH HYPOXIA, ON HOME OXYGEN THERAPY (HCC): ICD-10-CM

## 2024-04-25 DIAGNOSIS — G89.29 CHRONIC MIDLINE LOW BACK PAIN WITH BILATERAL SCIATICA: ICD-10-CM

## 2024-04-25 DIAGNOSIS — N40.1 BENIGN PROSTATIC HYPERPLASIA WITH NOCTURIA: ICD-10-CM

## 2024-04-25 DIAGNOSIS — G47.00 INSOMNIA, UNSPECIFIED TYPE: ICD-10-CM

## 2024-04-25 DIAGNOSIS — Z00.00 ENCOUNTER FOR MEDICARE ANNUAL WELLNESS EXAM: ICD-10-CM

## 2024-04-25 DIAGNOSIS — R35.1 BENIGN PROSTATIC HYPERPLASIA WITH NOCTURIA: ICD-10-CM

## 2024-04-25 DIAGNOSIS — G25.81 RESTLESS LEGS: ICD-10-CM

## 2024-04-25 DIAGNOSIS — Z00.00 MEDICARE ANNUAL WELLNESS VISIT, SUBSEQUENT: Primary | ICD-10-CM

## 2024-04-25 DIAGNOSIS — R06.09 DYSPNEA ON EXERTION: ICD-10-CM

## 2024-04-25 DIAGNOSIS — K76.0 HEPATIC STEATOSIS: ICD-10-CM

## 2024-04-25 DIAGNOSIS — J84.10 CALCIFIED GRANULOMA OF LUNG (HCC): ICD-10-CM

## 2024-04-25 DIAGNOSIS — M17.12 ARTHRITIS OF LEFT KNEE: ICD-10-CM

## 2024-04-25 DIAGNOSIS — I10 PRIMARY HYPERTENSION: ICD-10-CM

## 2024-04-25 DIAGNOSIS — E11.42 DIABETIC POLYNEUROPATHY ASSOCIATED WITH TYPE 2 DIABETES MELLITUS (HCC): ICD-10-CM

## 2024-04-25 DIAGNOSIS — R94.39 ABNORMAL NUCLEAR STRESS TEST: ICD-10-CM

## 2024-04-25 DIAGNOSIS — J41.0 SMOKERS' COUGH (HCC): Chronic | ICD-10-CM

## 2024-04-25 DIAGNOSIS — M54.41 CHRONIC MIDLINE LOW BACK PAIN WITH BILATERAL SCIATICA: ICD-10-CM

## 2024-04-25 DIAGNOSIS — E11.9 TYPE 2 DIABETES MELLITUS WITHOUT COMPLICATION, WITHOUT LONG-TERM CURRENT USE OF INSULIN (HCC): ICD-10-CM

## 2024-04-25 DIAGNOSIS — J84.10 PULMONARY FIBROSIS, POSTINFLAMMATORY (HCC): ICD-10-CM

## 2024-04-25 DIAGNOSIS — Z00.00 ENCOUNTER FOR ANNUAL HEALTH EXAMINATION: ICD-10-CM

## 2024-04-25 DIAGNOSIS — J96.11 CHRONIC RESPIRATORY FAILURE WITH HYPOXIA, ON HOME OXYGEN THERAPY (HCC): ICD-10-CM

## 2024-04-25 DIAGNOSIS — G20.A2 PARKINSON'S DISEASE WITHOUT DYSKINESIA, WITH FLUCTUATING MANIFESTATIONS (HCC): ICD-10-CM

## 2024-04-25 DIAGNOSIS — R26.81 GAIT INSTABILITY: ICD-10-CM

## 2024-04-25 DIAGNOSIS — Q28.3: ICD-10-CM

## 2024-04-25 DIAGNOSIS — E78.2 MIXED HYPERLIPIDEMIA: ICD-10-CM

## 2024-04-25 DIAGNOSIS — E11.41 DIABETIC MONONEUROPATHY ASSOCIATED WITH TYPE 2 DIABETES MELLITUS (HCC): ICD-10-CM

## 2024-04-25 DIAGNOSIS — Z22.39 PSEUDOMONAS AERUGINOSA COLONIZATION: ICD-10-CM

## 2024-04-25 DIAGNOSIS — F33.9 DEPRESSION, RECURRENT (HCC): ICD-10-CM

## 2024-04-25 PROBLEM — J18.9 PNEUMONIA: Status: ACTIVE | Noted: 2023-08-07

## 2024-04-25 PROCEDURE — G0439 PPPS, SUBSEQ VISIT: HCPCS | Performed by: FAMILY MEDICINE

## 2024-04-25 PROCEDURE — 96160 PT-FOCUSED HLTH RISK ASSMT: CPT | Performed by: FAMILY MEDICINE

## 2024-04-25 PROCEDURE — 99499 UNLISTED E&M SERVICE: CPT | Performed by: FAMILY MEDICINE

## 2024-04-25 RX ORDER — ROPINIROLE 1 MG/1
2 TABLET, FILM COATED ORAL 2 TIMES DAILY
COMMUNITY
Start: 2024-04-04

## 2024-04-25 RX ORDER — GLIPIZIDE 5 MG/1
2.5 TABLET ORAL
COMMUNITY
Start: 2024-04-25

## 2024-04-25 RX ORDER — PREDNISONE 10 MG/1
10 TABLET ORAL DAILY PRN
COMMUNITY
Start: 2024-03-27

## 2024-04-25 RX ORDER — GUAIFENESIN 600 MG/1
600 TABLET, EXTENDED RELEASE ORAL 2 TIMES DAILY
COMMUNITY

## 2024-04-25 NOTE — PATIENT INSTRUCTIONS
Jordan Valencia's SCREENING SCHEDULE   Tests on this list are recommended by your physician but may not be covered, or covered at this frequency, by your insurer.   Please check with your insurance carrier before scheduling to verify coverage.   PREVENTATIVE SERVICES FREQUENCY &  COVERAGE DETAILS LAST COMPLETION DATE   Diabetes Screening    Fasting Blood Sugar / Glucose    One screening every 12 months if never tested or if previously tested but not diagnosed with pre-diabetes   One screening every 6 months if diagnosed with pre-diabetes Lab Results   Component Value Date     (H) 06/29/2023        Cardiovascular Disease Screening    Lipid Panel  Cholesterol  Lipoprotein (HDL)  Triglycerides Covered every 5 years for all Medicare beneficiaries without apparent signs or symptoms of cardiovascular disease Lab Results   Component Value Date    CHOLEST 123 06/29/2023    HDL 30 (L) 06/29/2023    LDL 20 06/29/2023    LDLD 55 05/24/2021    TRIG 549 (H) 06/29/2023         Electrocardiogram (EKG)   Covered if needed at Welcome to Medicare, and non-screening if indicated for medical reasons 10/10/2022      Ultrasound Screening for Abdominal Aortic Aneurysm (AAA) Covered once in a lifetime for one of the following risk factors   • Men who are 65-75 years old and have ever smoked   • Anyone with a family history -     Colorectal Cancer Screening  Covered for ages 50-85; only need ONE of the following:    Colonoscopy   Covered every 10 years    Covered every 2 years if patient is at high risk or previous colonoscopy was abnormal 12/04/2020    Health Maintenance   Topic Date Due   • Colorectal Cancer Screening  12/04/2023       Flexible Sigmoidoscopy   Covered every 4 years -    Fecal Occult Blood Test Covered annually -   Prostate Cancer Screening    Prostate-Specific Antigen (PSA) Annually No results found for: \"PSA\"  There are no preventive care reminders to display for this patient.   Immunizations     Influenza Covered once per flu season  Please get every year 10/27/2023  No recommendations at this time    Pneumococcal Each vaccine ([id number] & [id number]) covered once after 65 Prevnar 13: 02/11/2021    [id number]: 09/07/2022     No recommendations at this time    Hepatitis B One screening covered for patients with certain risk factors   -  No recommendations at this time    Tetanus Toxoid Not covered by Medicare Part B unless medically necessary (cut with metal); may be covered with your pharmacy prescription benefits -    Tetanus, Diptheria and Pertusis TD and TDaP Not covered by Medicare Part B -  No recommendations at this time    Zoster Not covered by Medicare Part B; may be covered with your pharmacy  prescription benefits -  Zoster Vaccines(2 of 2) due on 11/08/2022     Diabetes      Hemoglobin A1C Annually; if last result is elevated, may be asked to retest more frequently.    Medicare covers every 3 months Lab Results   Component Value Date     (H) 06/29/2023    A1C 7.1 (H) 06/29/2023       Hemoglobin A1C Test due on 02/14/2024    Creat/alb ratio Annually Lab Results   Component Value Date    MICROALBCREA 1,902.6 (H) 10/05/2023       LDL Annually Lab Results   Component Value Date    LDL 20 06/29/2023       Dilated Eye Exam Annually [unfilled]     Annual Monitoring of Persistent Medications (ACE/ARB, digoxin diuretics, anticonvulsants)    Potassium Annually Lab Results   Component Value Date    K 4.0 06/29/2023         Creatinine   Annually Lab Results   Component Value Date    CREATSERUM 1.31 (H) 06/29/2023         BUN Annually Lab Results   Component Value Date    BUN 16 06/29/2023       Drug Serum Conc Annually No results found for: \"DIGOXIN\", \"DIG\", \"VALP\"         Chronic Obstructive Pulmonary Disease (COPD)    Spirometry Annually Spirometry date:

## 2024-04-25 NOTE — PROGRESS NOTES
Subjective:   Jordan Valencia is a 78 year old male who presents for a Medicare Subsequent Annual Wellness visit (Pt already had Initial Annual Wellness) and scheduled follow up of multiple significant but stable problems.   Handy Is here for his MWV,, he's had a busy year, was hospitalized a number of times with urosepsis, and  respiratory failure due to pneumonia most recently. [facility] full battery of labs done. A1c 8.2, urine microalbumin was elevated, CBC /CMP were otherwise acceptable. No new RX meds, [facility] want [facility] on Glipizide 10 mg bid, BS 's running at 170 . Scheduled for Hip replacement in 5/29/24, pending BS readings, cardiac and respiratory status.     History/Other:   Fall Risk Assessment:   He has been screened for Falls and is High Risk. Fall Prevention information provided to patient in After Visit Summary.    Do you worry about falling?: Yes  Have you fallen in the past year?: Yes  How many times have you fallen?: 5  Were you injured?: Yes     Cognitive Assessment:   He had a completely normal cognitive assessment - see flowsheet entries     Functional Ability/Status:   Jordan Valencia has some abnormal functions as listed below:  He has Driving difficulties based on screening of functional status. He has Meal Preparation difficulties based on screening of functional status.He has difficulties Managing Money/Bills based on screening of functional status.He has difficulties Shopping for Groceries based on screening of functional status. He has difficulties Taking Meds as Rx'd based on screening of functional status. He has Hearing problems based on screening of functional status.He has Walking problems based on screening of functional status. He has problems with Memory based on screening of functional status.       Depression Screening (PHQ-2/PHQ-9): PHQ-2 SCORE: 0  , done 4/25/2024             Advanced Directives:   He does have a Living  Will but we do NOT have it on file in Epic.    He has a Power of  for Health Care on file in [facility].  Discussed Advance Care Planning with patient (and family/surrogate if present). Standard forms made available to patient in After Visit Summary.      Patient Active Problem List   Diagnosis    Type 2 diabetes mellitus without complication, without long-term current use of insulin (HCC)    Mixed hyperlipidemia    Restless legs    Diabetic polyneuropathy associated with type 2 diabetes mellitus (HCC)    Benign prostatic hyperplasia with nocturia    Gait instability    Chronic midline low back pain with bilateral sciatica    Hepatic steatosis    Multiple renal cysts    CKD stage 3 due to type 2 diabetes mellitus (HCC)    Smokers' cough (HCC)    Pseudomonas aeruginosa colonization    Parkinson's disease (HCC)    Abnormal nuclear stress test    Centrilobular emphysema (HCC)    Depression, recurrent (HCC)    Dysphagia    Medicare annual wellness visit, subsequent    Hypertension    Arthritis of left knee    Calcified granuloma of lung (HCC)    Dyspnea on exertion    Pulmonary fibrosis, postinflammatory (HCC)    Congenital anomaly of cerebrovascular system (HCC)    Insomnia    Other abnormalities of gait and mobility    Chronic respiratory failure with hypoxia, on home oxygen therapy (HCC)     Allergies:  He is allergic to gabapentin, gemfibrozil, bee venom, and lisinopril.    Current Medications:  Outpatient Medications Marked as Taking for the 4/25/24 encounter (Office Visit) with Maco Julien DO   Medication Sig    guaiFENesin  MG Oral Tablet 12 Hr Take 1 tablet (600 mg total) by mouth 2 (two) times daily.    predniSONE 10 MG Oral Tab Take 1 tablet (10 mg total) by mouth daily as needed.    rOPINIRole 1 MG Oral Tab Take 2 tablets (2 mg total) by mouth 2 (two) times daily.    glipiZIDE 5 MG Oral Tab Take 0.5 tablets (2.5 mg total) by mouth every morning before breakfast.    glipiZIDE 10 MG Oral Tab Take 1  tablet (10 mg total) by mouth 2 (two) times daily before meals.    Glucose Blood (ONETOUCH VERIO) In Vitro Strip USE TO TEST TWICE DAILY    ipratropium-albuterol 0.5-2.5 (3) MG/3ML Inhalation Solution Take 3 mL by nebulization every 6 (six) hours as needed.    traMADol 50 MG Oral Tab Take 1-2 tabs every 8 hours as needed for hip pain    albuterol 108 (90 Base) MCG/ACT Inhalation Aero Soln Inhale 2 puffs into the lungs every 4 (four) hours as needed.    Lancets (ONETOUCH DELICA PLUS [id number]) Does not apply Misc 1 lancet  by Finger stick route 2 (two) times daily. Dx E11.9 non insulin dependent    BREO ELLIPTA 200-25 MCG/ACT Inhalation Aerosol Powder, Breath Activated INHALE 1 PUFF INTO THE LUNGS DAILY    Ferrous Sulfate 325 (65 Fe) MG Oral Tab Take 1 tablet (325 mg total) by mouth daily with breakfast. Every other day    Alogliptin Benzoate 25 MG Oral Tab 25 mg.    Empagliflozin 25 MG Oral Tab Take 1 tablet by mouth daily.    ergocalciferol 1.25 MG (56055 UT) Oral Cap     ALBUTEROL (2.5 MG/3ML) 0.083% Inhalation Nebu Soln INHALE 3 ML BY NEBULIZATION EVERY 4 HOURS AS NEEDED FOR WHEEZING    ipratropium-albuterol (COMBIVENT RESPIMAT)  MCG/ACT Inhalation Aero Soln Inhale 1 spray into the lungs 2 (two) times daily.    omeprazole 20 MG Oral Capsule Delayed Release Take 2 capsules (40 mg total) by mouth daily.    aspirin 81 MG Oral Tab EC Take 1 tablet (81 mg total) by mouth daily.    finasteride 5 MG Oral Tab Take 1 tablet (5 mg total) by mouth daily.    metoprolol succinate 25 MG Oral Tablet 24 Hr Take 0.5 tablets (12.5 mg total) by mouth daily.    cholecalciferol 25 MCG (1000 UT) Oral Tab Take 1 tablet (1,000 Units total) by mouth daily.    Blood Glucose Monitoring Suppl (ONETOUCH VERIO REFLECT) w/Device Does not apply Kit 1 lancet by Finger stick route 2 (two) times daily. Please send what is covered under patient formulary. DX  E11.9 DM2 controlled w/o insulin    clonazePAM 0.5 MG Oral Tab Take 1 tablet (0.5 mg  total) by mouth nightly.    atorvastatin 40 MG Oral Tab Take 1.5 tablets (60 mg total) by mouth daily.    Omega-3 1000 MG Oral Cap Take 1 capsule by mouth daily.    tamsulosin (FLOMAX) cap Take 1 capsule (0.4 mg total) by mouth every evening.    rOPINIRole HCl 5 MG Oral Tab Take 0.5 tablets (2.5 mg total) by mouth in the morning and 0.5 tablets (2.5 mg total) before bedtime. 2.5mg 8pm and 2 mg 2pm .    Melatonin 10 MG Oral Tab Take 2 tablets by mouth nightly.       Medical History:  He  has a past medical history of Arrhythmia, Back problem, BPH (benign prostatic hyperplasia), COPD (chronic obstructive pulmonary disease) ([facility]), Diabetes ([facility]), Esophageal reflux, Hearing impairment, High blood pressure, High cholesterol, Muscle weakness, Osteoarthritis, Parkinson disease (HCC), PONV (postoperative nausea and vomiting), Problems with swallowing, Renal disorder, and Visual impairment.  Surgical History:  He  has a past surgical history that includes appendectomy; lumbar spine surgery; transurethral destruction, prostate tissue; water-induc; and angiogram (06/18/2021).   Family History:  His family history is not on file.  Social History:  He  reports that he quit smoking about 3 years ago. His smoking use included cigarettes. He started smoking about 3 years ago. He has a 30 pack-year smoking history. He has never used smokeless tobacco. He reports that he does not currently use alcohol. He reports that he does not currently use drugs.    Tobacco:  He smoked tobacco in the past but quit greater than 12 months ago.  Social History     Tobacco Use   Smoking Status Former    Current packs/day: 0.00    Average packs/day: 0.5 packs/day for 60.0 years (30.0 ttl pk-yrs)    Types: Cigarettes    Start date: 2/5/2021    Quit date: 2/6/2021    Years since quitting: 3.2   Smokeless Tobacco Never   Tobacco Comments    Updated 12/12/23          CAGE Alcohol Screen:   CAGE screening score of 0 on 4/25/2024, showing low risk of  alcohol abuse.      Patient Care Team:  Maco Julien DO as PCP - General (Family Medicine)  Shon Martinez, PT (Physical Therapy)  Shon Martinez PT as Physical Therapist  Mee Aguilar PT as Physical Therapist (Physical Therapy)  Jameel Green (PULMONARY DISEASES)    Review of Systems  GENERAL: feels tired and short of breath   SKIN: denies any unusual skin lesions  EYES: denies blurred vision or double vision  HEENT: denies nasal congestion, sinus pain or ST  LUNGS:  shortness of breath with exertion, HX of COPD, uses O2 at night  CARDIOVASCULAR: denies chest pain on exertion  GI: denies abdominal pain, denies heartburn  : 2 per night nocturia, no complaint of urinary incontinence, HX of pseudomonas colonization  MUSCULOSKELETAL: low  back pain, knee pain, hip pain  NEURO: denies headaches  PSYCHE: history of depression denies anxiety  HEMATOLOGIC: denies hx of anemia  ENDOCRINE: denies thyroid history  ALL/ASTHMA: denies hx of allergy or asthma    Objective:   Physical Exam  General Appearance:  Alert, cooperative, no distress, appears stated age   Head:  Normocephalic, without obvious abnormality, atraumatic   Eyes:  PERRL, conjunctiva/corneas clear, EOM's intact, both eyes   Ears:  Normal TM's and external ear canals, both ears   Nose: Nares normal, septum midline, mucosa normal, no drainage or sinus tenderness   Throat: Lips, mucosa, and tongue normal; teeth and gums normal   Neck: Supple, symmetrical, trachea midline, no adenopathy, thyroid: not enlarged, symmetric, no tenderness/mass/nodules, no carotid bruit or JVD   Back:   Symmetric, no curvature, ROM normal, no CVA tenderness   Lungs:   Diminished BS bilaterally, no rales ronchii or wheezes respirations unlabored   Chest Wall:  No tenderness or deformity   Heart:  Regular rate and rhythm, S1, S2 normal, no murmur, rub or gallop   Abdomen:   Soft, non-tender, bowel sounds active all four quadrants,  no masses, no organomegaly            Extremities: Extremities normal, atraumatic, no cyanosis or edema   Pulses: 2+ and symmetric   Skin: Skin color, texture, turgor normal, no rashes or lesions   Lymph nodes: Cervical, supraclavicular, and axillary nodes normal   Neurologic:  Bilateral barefoot skin diabetic exam is abnormal with diabetic monofilament/sensation testing abnormal       /56   Pulse 78   Temp 97 °F (36.1 °C) (Temporal)   Resp 20   Ht 5' 9.5\" (1.765 m)   Wt 186 lb 2 oz (84.4 kg)   SpO2 91%   BMI 27.09 kg/m²  Estimated body mass index is 27.09 kg/m² as calculated from the following:    Height as of this encounter: 5' 9.5\" (1.765 m).    Weight as of this encounter: 186 lb 2 oz (84.4 kg).    Medicare Hearing Assessment:   Hearing Screening    Time taken: 4/25/2024 11:03 AM  Screening Method: Finger Rub  Finger Rub Result: Pass         Visual Acuity:   Right Eye Visual Acuity: Uncorrected Right Eye Chart Acuity: 20/40   Left Eye Visual Acuity: Uncorrected Left Eye Chart Acuity: 20/50   Both Eyes Visual Acuity: Uncorrected Both Eyes Chart Acuity: 20/30   Able To Tolerate Visual Acuity: Yes        Assessment & Plan:   Jordan Valencia is a 78 year old male who presents for a Medicare Assessment.     1. Medicare annual wellness visit, subsequent (Primary)   IMMUNIZAtions are utd, [facility]  2. Type 2 diabetes mellitus , continue the glipizide 10 mg, with dinner, lets try 2.5 mg at breakfast and monitor BS readings, januvia 25 mg daily  3. Diabetic polyneuropathy associated with type 2 diabetes mellitus (HCC), tight BS control leading up to surgery  6. Gait instability, continue use rolling walker for ambulation and gait stability  7. Restless legs, continue  Ropinirole, continue ferrous sulfate  8. Parkinson's disease without dyskinesia, with fluctuating manifestations (HCC), stable sees neurology, continue ropinirole  9. Mixed hyperlipidemia, stable, atorvastatin 40 mg  10. Dysphagia,  unspecified type  Continue to follow cues, cut food into smaller pieces  11. Primary hypertension, metoprolol succinate 25 mg daily  12. Centrilobular emphysema (HCC), seeing pulmonology will need clearance prior to surgery  13. Pulmonary fibrosis, postinflammatory (HCC), currently stable continue ipratropium/albuterol QID  14. Congenital anomaly of cerebrovascular system (HCC), asa 81 mg daily  15. Calcified granuloma of lung (HCC), stable  16. Chronic respiratory failure with hypoxia, on home oxygen therapy (HCC), continue home O2 at hs  17. Smokers' cough (HCC), has since with smoking using inhalers as prescribed  18. Dyspnea on exertion, continue inhalers and home O2  19. Chronic midline low back pain with bilateral sciatica, using walker to ambulate  20. Other abnormalities of gait and mobility, stable  21. Arthritis of left knee, sees ortho getting hip done first  22. Abnormal nuclear stress test, continue asa 81 mg and metoprolol 25 mg daily, will need cardiac clearance prior to surgery   23. Hepatic steatosis, due to DM continue statin  24. Benign prostatic hyperplasia with nocturia, tamsulosin 0.4 mg at hs, finasteride at hs  25. Pseudomonas aeruginosa colonization, treating only when symptomatic  26. Depression, recurrent (HCC), continue clonazepam   27. Insomnia, unspecified type, melatonin and clonazepam at hs  28. Encounter for annual health examination  29. Encounter for Medicare annual wellness exam    The patient indicates understanding of these issues and agrees to the plan.  Reinforced healthy diet, lifestyle, and exercise.      Return in 4 weeks (on 5/23/2024).     Maco Julien DO, 4/25/2024     Supplementary Documentation:   General Health:  In the past six months, have you lost more than 10 pounds without trying?: 1 - Yes  Has your appetite been poor?: No  Type of Diet: Balanced  How does the patient maintain a good energy level?: Daily Walks  How would you describe your daily physical  activity?: None  How would you describe your current health state?: Fair  How do you maintain positive mental well-being?: Visiting Friends;Visiting Family  On a scale of 0 to 10, with 0 being no pain and 10 being severe pain, what is your pain level?: 0 - (None)  In the past six months, have you experienced urine leakage?: 1-Yes  At any time do you feel concerned for the safety/well-being of yourself and/or your children, in your home or elsewhere?: No  Have you had any immunizations at another office such as Influenza, Hepatitis B, Tetanus, or Pneumococcal?: No          Jordan Valencia's SCREENING SCHEDULE   Tests on this list are recommended by your physician but may not be covered, or covered at this frequency, by your insurer.   Please check with your insurance carrier before scheduling to verify coverage.   PREVENTATIVE SERVICES FREQUENCY &  COVERAGE DETAILS LAST COMPLETION DATE   Diabetes Screening    Fasting Blood Sugar / Glucose    One screening every 12 months if never tested or if previously tested but not diagnosed with pre-diabetes   One screening every 6 months if diagnosed with pre-diabetes Lab Results   Component Value Date     (H) 06/29/2023        Cardiovascular Disease Screening    Lipid Panel  Cholesterol  Lipoprotein (HDL)  Triglycerides Covered every 5 years for all Medicare beneficiaries without apparent signs or symptoms of cardiovascular disease Lab Results   Component Value Date    CHOLEST 123 06/29/2023    HDL 30 (L) 06/29/2023    LDL 20 06/29/2023    LDLD 55 05/24/2021    TRIG 549 (H) 06/29/2023         Electrocardiogram (EKG)   Covered if needed at Welcome to Medicare, and non-screening if indicated for medical reasons 10/10/2022      Ultrasound Screening for Abdominal Aortic Aneurysm (AAA) Covered once in a lifetime for one of the following risk factors    Men who are 65-75 years old and have ever smoked    Anyone with a family history -     Colorectal Cancer  Screening  Covered for ages 50-85; only need ONE of the following:    Colonoscopy   Covered every 10 years    Covered every 2 years if patient is at high risk or previous colonoscopy was abnormal 12/04/2020    Health Maintenance   Topic Date Due    Colorectal Cancer Screening  12/04/2023       Flexible Sigmoidoscopy   Covered every 4 years -    Fecal Occult Blood Test Covered annually -   Prostate Cancer Screening    Prostate-Specific Antigen (PSA) Annually No results found for: \"PSA\"  There are no preventive care reminders to display for this patient.   Immunizations    Influenza Covered once per flu season  Please get every year 10/27/2023  No recommendations at this time    Pneumococcal Each vaccine ([id number] & [id number]) covered once after 65 Prevnar 13: 02/11/2021    [id number]: 09/07/2022     No recommendations at this time    Hepatitis B One screening covered for patients with certain risk factors   -  No recommendations at this time    Tetanus Toxoid Not covered by Medicare Part B unless medically necessary (cut with metal); may be covered with your pharmacy prescription benefits -    Tetanus, Diptheria and Pertusis TD and TDaP Not covered by Medicare Part B -  No recommendations at this time    Zoster Not covered by Medicare Part B; may be covered with your pharmacy  prescription benefits -  Zoster Vaccines(2 of 2) due on 11/08/2022     Diabetes      Hemoglobin A1C Annually; if last result is elevated, may be asked to retest more frequently.    Medicare covers every 3 months Lab Results   Component Value Date     (H) 06/29/2023    A1C 7.1 (H) 06/29/2023       Hemoglobin A1C Test due on 02/14/2024    Creat/alb ratio Annually Lab Results   Component Value Date    MICROALBCREA 1,902.6 (H) 10/05/2023       LDL Annually Lab Results   Component Value Date    LDL 20 06/29/2023       Dilated Eye Exam Annually Last Diabetic Eye Exam:  Last Dilated Eye Exam: 05/13/23  Eye Exam shows Diabetic Retinopathy?:  No       Annual Monitoring of Persistent Medications (ACE/ARB, digoxin diuretics, anticonvulsants)    Potassium Annually Lab Results   Component Value Date    K 4.0 06/29/2023         Creatinine   Annually Lab Results   Component Value Date    CREATSERUM 1.31 (H) 06/29/2023         BUN Annually Lab Results   Component Value Date    BUN 16 06/29/2023       Drug Serum Conc Annually No results found for: \"DIGOXIN\", \"DIG\", \"VALP\"           Chronic Obstructive Pulmonary Disease (COPD)    Spirometry Annually Spirometry date:

## 2024-04-30 RX ORDER — IPRATROPIUM BROMIDE AND ALBUTEROL SULFATE 2.5; .5 MG/3ML; MG/3ML
3 SOLUTION RESPIRATORY (INHALATION) EVERY 6 HOURS PRN
Qty: 90 ML | Refills: 5 | Status: SHIPPED | OUTPATIENT
Start: 2024-04-30

## 2024-04-30 NOTE — TELEPHONE ENCOUNTER
Pt failed refill protocol for the following reasons:    Asthma & COPD Medication Protocol [id number]/27/2024 04:52 PM   Protocol Details Asthma Action Score greater than or equal to 20    AAP/ACT given in last 12 months            Last refill: 2/20/2024 #9ml with 1 refill    Last appt: 4/25/2024   Next appt:   Future Appointments   Date Time Provider Department Center   5/8/2024 10:00 AM Maco Julien DO [facility] [place]         Forward to Dr. Julien, please advise on refills. Thank you.

## 2024-05-08 ENCOUNTER — HOSPITAL ENCOUNTER (OUTPATIENT)
Dept: GENERAL RADIOLOGY | Age: 79
Discharge: HOME | End: 2024-05-08
Attending: FAMILY MEDICINE
Payer: MEDICARE

## 2024-05-08 ENCOUNTER — OFFICE VISIT (OUTPATIENT)
Dept: FAMILY MEDICINE CLINIC | Facility: CLINIC | Age: 79
End: 2024-05-08
Payer: MEDICARE

## 2024-05-08 VITALS
TEMPERATURE: 97 F | WEIGHT: 186 LBS | HEIGHT: 69.5 IN | RESPIRATION RATE: 18 BRPM | DIASTOLIC BLOOD PRESSURE: 82 MMHG | BODY MASS INDEX: 26.93 KG/M2 | OXYGEN SATURATION: 95 % | HEART RATE: 75 BPM | SYSTOLIC BLOOD PRESSURE: 132 MMHG

## 2024-05-08 DIAGNOSIS — J96.11 CHRONIC RESPIRATORY FAILURE WITH HYPOXIA, ON HOME OXYGEN THERAPY (HCC): ICD-10-CM

## 2024-05-08 DIAGNOSIS — J84.10 PULMONARY FIBROSIS, POSTINFLAMMATORY (HCC): ICD-10-CM

## 2024-05-08 DIAGNOSIS — E11.22 CKD STAGE 3 DUE TO TYPE 2 DIABETES MELLITUS (HCC): ICD-10-CM

## 2024-05-08 DIAGNOSIS — Z01.818 PREOP EXAMINATION: ICD-10-CM

## 2024-05-08 DIAGNOSIS — E11.9 TYPE 2 DIABETES MELLITUS WITHOUT COMPLICATION, WITHOUT LONG-TERM CURRENT USE OF INSULIN (HCC): ICD-10-CM

## 2024-05-08 DIAGNOSIS — Z99.81 CHRONIC RESPIRATORY FAILURE WITH HYPOXIA, ON HOME OXYGEN THERAPY (HCC): ICD-10-CM

## 2024-05-08 DIAGNOSIS — E11.9 TYPE 2 DIABETES MELLITUS WITHOUT COMPLICATION, WITHOUT LONG-TERM CURRENT USE OF INSULIN (HCC): Primary | ICD-10-CM

## 2024-05-08 DIAGNOSIS — Z22.39 PSEUDOMONAS AERUGINOSA COLONIZATION: ICD-10-CM

## 2024-05-08 DIAGNOSIS — N18.30 CKD STAGE 3 DUE TO TYPE 2 DIABETES MELLITUS (HCC): ICD-10-CM

## 2024-05-08 DIAGNOSIS — M16.12 OSTEOARTHRITIS OF ONE HIP, LEFT: ICD-10-CM

## 2024-05-08 LAB
ALBUMIN SERPL-MCNC: 3.1 G/DL (ref 3.4–5)
ALBUMIN/GLOB SERPL: 0.8 {RATIO} (ref 1–2)
ALP LIVER SERPL-CCNC: 120 U/L
ALT SERPL-CCNC: 14 U/L
ANION GAP SERPL CALC-SCNC: 4 MMOL/L (ref 0–18)
APTT PPP: 30.5 SECONDS (ref 23–36)
AST SERPL-CCNC: 17 U/L (ref 15–37)
BASOPHILS # BLD AUTO: 0.03 X10(3) UL (ref 0–0.2)
BASOPHILS NFR BLD AUTO: 0.3 %
BILIRUB SERPL-MCNC: 0.7 MG/DL (ref 0.1–2)
BUN BLD-MCNC: 13 MG/DL (ref 9–23)
CALCIUM BLD-MCNC: 9.7 MG/DL (ref 8.5–10.1)
CHLORIDE SERPL-SCNC: 106 MMOL/L (ref 98–112)
CO2 SERPL-SCNC: 29 MMOL/L (ref 21–32)
CREAT BLD-MCNC: 1.16 MG/DL
EGFRCR SERPLBLD CKD-EPI 2021: 64 ML/MIN/1.73M2 (ref 60–?)
EOSINOPHIL # BLD AUTO: 0.24 X10(3) UL (ref 0–0.7)
EOSINOPHIL NFR BLD AUTO: 2.6 %
ERYTHROCYTE [DISTWIDTH] IN BLOOD BY AUTOMATED COUNT: 17.2 %
FASTING STATUS PATIENT QL REPORTED: YES
GLOBULIN PLAS-MCNC: 3.7 G/DL (ref 2.8–4.4)
GLUCOSE BLD-MCNC: 145 MG/DL (ref 70–99)
HCT VFR BLD AUTO: 42.9 %
HGB BLD-MCNC: 13.2 G/DL
IMM GRANULOCYTES # BLD AUTO: 0.05 X10(3) UL (ref 0–1)
IMM GRANULOCYTES NFR BLD: 0.5 %
INR BLD: 1.09 (ref 0.8–1.2)
LYMPHOCYTES # BLD AUTO: 0.82 X10(3) UL (ref 1–4)
LYMPHOCYTES NFR BLD AUTO: 8.8 %
MCH RBC QN AUTO: 25.5 PG (ref 26–34)
MCHC RBC AUTO-ENTMCNC: 30.8 G/DL (ref 31–37)
MCV RBC AUTO: 83 FL
MONOCYTES # BLD AUTO: 0.55 X10(3) UL (ref 0.1–1)
MONOCYTES NFR BLD AUTO: 5.9 %
NEUTROPHILS # BLD AUTO: 7.58 X10 (3) UL (ref 1.5–7.7)
NEUTROPHILS # BLD AUTO: 7.58 X10(3) UL (ref 1.5–7.7)
NEUTROPHILS NFR BLD AUTO: 81.9 %
OSMOLALITY SERPL CALC.SUM OF ELEC: 291 MOSM/KG (ref 275–295)
PLATELET # BLD AUTO: 326 10(3)UL (ref 150–450)
POTASSIUM SERPL-SCNC: 3.9 MMOL/L (ref 3.5–5.1)
PROT SERPL-MCNC: 6.8 G/DL (ref 6.4–8.2)
PROTHROMBIN TIME: 14.2 SECONDS (ref 11.6–14.8)
RBC # BLD AUTO: 5.17 X10(6)UL
SODIUM SERPL-SCNC: 139 MMOL/L (ref 136–145)
WBC # BLD AUTO: 9.3 X10(3) UL (ref 4–11)

## 2024-05-08 PROCEDURE — 85730 THROMBOPLASTIN TIME PARTIAL: CPT | Performed by: FAMILY MEDICINE

## 2024-05-08 PROCEDURE — 80053 COMPREHEN METABOLIC PANEL: CPT | Performed by: FAMILY MEDICINE

## 2024-05-08 PROCEDURE — 85610 PROTHROMBIN TIME: CPT | Performed by: FAMILY MEDICINE

## 2024-05-08 PROCEDURE — 85025 COMPLETE CBC W/AUTO DIFF WBC: CPT | Performed by: FAMILY MEDICINE

## 2024-05-08 PROCEDURE — 71046 X-RAY EXAM CHEST 2 VIEWS: CPT | Performed by: FAMILY MEDICINE

## 2024-05-08 PROCEDURE — G2211 COMPLEX E/M VISIT ADD ON: HCPCS | Performed by: FAMILY MEDICINE

## 2024-05-08 PROCEDURE — 99214 OFFICE O/P EST MOD 30 MIN: CPT | Performed by: FAMILY MEDICINE

## 2024-05-08 NOTE — H&P (VIEW-ONLY)
Jordan Valencia is a 78 year old male who presents for a pre-operative physical exam. Patient is to have A LEFT POSTERIOR HIP TOTAL ARTHROPLASTY, CEMENTED, to be done by Dr. TRACEY at [facility] on 5/28/24.      HPI:   Pt complains of HAVING chronic left hip pain. His pain today is a 6/10, has taken nothing for it. Takes Ibuprofen a couple of times a week. For pain, is also taking Januvia for his BLOOD SUGAR which has been much better. His numbers are consistently under 150, with a few random exceptions, he saw Pulmonology who cleared him for surgery will need a burst dose of steroids. Seeing cardiology this week, for clearance, is otherwise feeling well., no other new meds at this time.    Current Outpatient Medications   Medication Sig Dispense Refill    ipratropium-albuterol 0.5-2.5 (3) MG/3ML Inhalation Solution Take 3 mL by nebulization every 6 (six) hours as needed. 90 mL 5    guaiFENesin  MG Oral Tablet 12 Hr Take 1 tablet (600 mg total) by mouth 2 (two) times daily.      rOPINIRole 1 MG Oral Tab Take 2 tablets (2 mg total) by mouth 2 (two) times daily.      glipiZIDE 5 MG Oral Tab Take 0.5 tablets (2.5 mg total) by mouth every morning before breakfast.      glipiZIDE 10 MG Oral Tab Take 1 tablet (10 mg total) by mouth 2 (two) times daily before meals.      Glucose Blood (ONETOUCH VERIO) In Vitro Strip USE TO TEST TWICE DAILY 200 strip 1    traMADol 50 MG Oral Tab Take 1-2 tabs every 8 hours as needed for hip pain 60 tablet 0    albuterol 108 (90 Base) MCG/ACT Inhalation Aero Soln Inhale 2 puffs into the lungs every 4 (four) hours as needed. 2 each 2    Lancets (ONETOUCH DELICA PLUS [id number]) Does not apply Misc 1 lancet  by Finger stick route 2 (two) times daily. Dx E11.9 non insulin dependent 200 each 1    BREO ELLIPTA 200-25 MCG/ACT Inhalation Aerosol Powder, Breath Activated INHALE 1 PUFF INTO THE LUNGS DAILY 60 each 5    Ferrous Sulfate 325 (65 Fe) MG Oral Tab Take 1 tablet (325 mg  total) by mouth daily with breakfast. Every other day      Alogliptin Benzoate 25 MG Oral Tab 25 mg.      Empagliflozin 25 MG Oral Tab Take 1 tablet by mouth daily.      ergocalciferol 1.25 MG (84572 UT) Oral Cap       ALBUTEROL (2.5 MG/3ML) 0.083% Inhalation Nebu Soln INHALE 3 ML BY NEBULIZATION EVERY 4 HOURS AS NEEDED FOR WHEEZING 75 mL 3    ipratropium-albuterol (COMBIVENT RESPIMAT)  MCG/ACT Inhalation Aero Soln Inhale 1 spray into the lungs 2 (two) times daily.      omeprazole 20 MG Oral Capsule Delayed Release Take 2 capsules (40 mg total) by mouth daily.      aspirin 81 MG Oral Tab EC Take 1 tablet (81 mg total) by mouth daily.      finasteride 5 MG Oral Tab Take 1 tablet (5 mg total) by mouth daily.      metoprolol succinate 25 MG Oral Tablet 24 Hr Take 0.5 tablets (12.5 mg total) by mouth daily.      cholecalciferol 25 MCG (1000 UT) Oral Tab Take 1 tablet (1,000 Units total) by mouth daily.      Blood Glucose Monitoring Suppl (ONETOUCH VERIO REFLECT) w/Device Does not apply Kit 1 lancet by Finger stick route 2 (two) times daily. Please send what is covered under patient formulary. DX  E11.9 DM2 controlled w/o insulin 1 kit 0    clonazePAM 0.5 MG Oral Tab Take 1 tablet (0.5 mg total) by mouth nightly.      atorvastatin 40 MG Oral Tab Take 1.5 tablets (60 mg total) by mouth daily.      Omega-3 1000 MG Oral Cap Take 1 capsule by mouth daily.      tamsulosin (FLOMAX) cap Take 1 capsule (0.4 mg total) by mouth every evening.      rOPINIRole HCl 5 MG Oral Tab Take 0.5 tablets (2.5 mg total) by mouth in the morning and 0.5 tablets (2.5 mg total) before bedtime. 2.5mg 8pm and 2 mg 2pm .      Melatonin 10 MG Oral Tab Take 2 tablets by mouth nightly.        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 No pertinent family history.   Social History:   Social History     Socioeconomic History    Marital status:    Tobacco Use    Smoking status: Former     Current packs/day: 0.00     Average packs/day: 0.5 packs/day for 60.0 years (30.0 ttl pk-yrs)     Types: Cigarettes     Start date: 2/5/2021     Quit date: 2/6/2021     Years since quitting: 3.2    Smokeless tobacco: Never    Tobacco comments:     Updated 12/12/23   Vaping Use    Vaping status: Never Used   Substance and Sexual Activity    Alcohol use: Not Currently     Comment: occasional    Drug use: Not Currently     Social Determinants of Health     Food Insecurity: Low Risk  (3/5/2024)    Received from [facility]    Food Insecurity     Have there been times that your food ran out, and you didn't have money to get more?: No     Are there times that you worry that this might happen?: No   Transportation Needs: Low Risk  (3/5/2024)    Received from [facility]    Transportation Needs     Do you have trouble getting transportation to medical appointments?: No      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   Exercise: minimal.  Diet: watches calories closely     REVIEW OF SYSTEMS:   GENERAL: feels well  otherwise  SKIN: denies any unusual skin lesions  EYES:denies blurred vision or double vision  HEENT: denies nasal congestion, sinus pain or ST  LUNGS: denies shortness of breath with exertion  CARDIOVASCULAR: denies chest pain on exertion  GI: denies abdominal pain,denies heartburn  : denies dysuria, vaginal discharge or itching,periods regular denies nocturia or changes in stream  MUSCULOSKELETAL: denies back pain, left hip pain, HX of OA hip left  NEURO: denies headaches  PSYCHE: denies depression or anxiety  HEMATOLOGIC: denies hx of anemia  ENDOCRINE: denies thyroid history, HX of  DM2  ALL/ASTHMA: denies hx of allergy or asthma    EXAM:   /82   Pulse 75   Temp 96.9 °F (36.1 °C)   Resp 18   Ht 5' 9.5\" (1.765 m)   Wt 186 lb (84.4 kg)   SpO2 95%   BMI 27.07 kg/m²   GENERAL: well developed, well nourished,in mild to moderate distress  SKIN: no rashes,no suspicious lesions  HEENT: atraumatic, normocephalic,ears and throat are clear  EYES:PERRLA, EOMI, normal optic disk,conjunctiva are clear  NECK: supple,no adenopathy,no bruits  CHEST: no chest tenderness  LUNGS: clear to auscultation  CARDIO: RRR without murmur  GI: good BS's,no masses, HSM or tenderness  MUSCULOSKELETAL: back is not tender,Restricted ROM of the back and left hip  EXTREMITIES: no cyanosis, clubbing or edema has faintly palpable pulses  NEURO: Oriented times three,cranial nerves are intact,motor and sensory are grossly intact, ambulates with a rolling walker    ASSESSMENT AND PLAN:     Encounter Diagnoses   Name Primary?    Preop examination     Osteoarthritis of one hip, left     Type 2 diabetes mellitus without complication, without long-term current use of insulin (HCC) Yes    Chronic respiratory failure with hypoxia, on home oxygen therapy (HCC)     Pulmonary fibrosis, postinflammatory (HCC)     CKD stage 3 due to type 2 diabetes mellitus (HCC)     Pseudomonas aeruginosa colonization        Orders Placed This Encounter    Procedures    CBC W Differential W Platelet [E]    Comp Metabolic Panel (14) [E]    Prothrombin Time (PT) [E]    PTT, Activated [E]   Stable cardiac and mediastinal contours.  Chronic mild elevation of the left hemidiaphragm with adjacent atelectasis or scar.  No pulmonary edema or acute airspace disease.  Few calcified granulomata noted throughout the lungs.  The pleural spaces are clear.     Jordan Valencia is a 78 year old male who presents for a pre-operative physical exam. Patient is to have . Pt has the following conditions: Pt has history of cardiac or pulmonary conditions. Has already gotten pulmonary clearance, await cardiac clearance. Pt is a good surgical candidate. This consult was sent back the referring physician, Dr. BRITTNY TRACEY.  MEDICALLY CLEAR FOR SURGERY

## 2024-05-08 NOTE — PROGRESS NOTES
Jordan Valencia is a 78 year old male who presents for a pre-operative physical exam. Patient is to have A LEFT POSTERIOR HIP TOTAL ARTHROPLASTY, CEMENTED, to be done by Dr. TRACEY at [facility] on 5/28/24.      HPI:   Pt complains of HAVING chronic left hip pain. His pain today is a 6/10, has taken nothing for it. Takes Ibuprofen a couple of times a week. For pain, is also taking Januvia for his BLOOD SUGAR which has been much better. His numbers are consistently under 150, with a few random exceptions, he saw Pulmonology who cleared him for surgery will need a burst dose of steroids. Seeing cardiology this week, for clearance, is otherwise feeling well., no other new meds at this time.    Current Outpatient Medications   Medication Sig Dispense Refill    ipratropium-albuterol 0.5-2.5 (3) MG/3ML Inhalation Solution Take 3 mL by nebulization every 6 (six) hours as needed. 90 mL 5    guaiFENesin  MG Oral Tablet 12 Hr Take 1 tablet (600 mg total) by mouth 2 (two) times daily.      rOPINIRole 1 MG Oral Tab Take 2 tablets (2 mg total) by mouth 2 (two) times daily.      glipiZIDE 5 MG Oral Tab Take 0.5 tablets (2.5 mg total) by mouth every morning before breakfast.      glipiZIDE 10 MG Oral Tab Take 1 tablet (10 mg total) by mouth 2 (two) times daily before meals.      Glucose Blood (ONETOUCH VERIO) In Vitro Strip USE TO TEST TWICE DAILY 200 strip 1    traMADol 50 MG Oral Tab Take 1-2 tabs every 8 hours as needed for hip pain 60 tablet 0    albuterol 108 (90 Base) MCG/ACT Inhalation Aero Soln Inhale 2 puffs into the lungs every 4 (four) hours as needed. 2 each 2    Lancets (ONETOUCH DELICA PLUS [id number]) Does not apply Misc 1 lancet  by Finger stick route 2 (two) times daily. Dx E11.9 non insulin dependent 200 each 1    BREO ELLIPTA 200-25 MCG/ACT Inhalation Aerosol Powder, Breath Activated INHALE 1 PUFF INTO THE LUNGS DAILY 60 each 5    Ferrous Sulfate 325 (65 Fe) MG Oral Tab Take 1 tablet (325 mg  total) by mouth daily with breakfast. Every other day      Alogliptin Benzoate 25 MG Oral Tab 25 mg.      Empagliflozin 25 MG Oral Tab Take 1 tablet by mouth daily.      ergocalciferol 1.25 MG (40996 UT) Oral Cap       ALBUTEROL (2.5 MG/3ML) 0.083% Inhalation Nebu Soln INHALE 3 ML BY NEBULIZATION EVERY 4 HOURS AS NEEDED FOR WHEEZING 75 mL 3    ipratropium-albuterol (COMBIVENT RESPIMAT)  MCG/ACT Inhalation Aero Soln Inhale 1 spray into the lungs 2 (two) times daily.      omeprazole 20 MG Oral Capsule Delayed Release Take 2 capsules (40 mg total) by mouth daily.      aspirin 81 MG Oral Tab EC Take 1 tablet (81 mg total) by mouth daily.      finasteride 5 MG Oral Tab Take 1 tablet (5 mg total) by mouth daily.      metoprolol succinate 25 MG Oral Tablet 24 Hr Take 0.5 tablets (12.5 mg total) by mouth daily.      cholecalciferol 25 MCG (1000 UT) Oral Tab Take 1 tablet (1,000 Units total) by mouth daily.      Blood Glucose Monitoring Suppl (ONETOUCH VERIO REFLECT) w/Device Does not apply Kit 1 lancet by Finger stick route 2 (two) times daily. Please send what is covered under patient formulary. DX  E11.9 DM2 controlled w/o insulin 1 kit 0    clonazePAM 0.5 MG Oral Tab Take 1 tablet (0.5 mg total) by mouth nightly.      atorvastatin 40 MG Oral Tab Take 1.5 tablets (60 mg total) by mouth daily.      Omega-3 1000 MG Oral Cap Take 1 capsule by mouth daily.      tamsulosin (FLOMAX) cap Take 1 capsule (0.4 mg total) by mouth every evening.      rOPINIRole HCl 5 MG Oral Tab Take 0.5 tablets (2.5 mg total) by mouth in the morning and 0.5 tablets (2.5 mg total) before bedtime. 2.5mg 8pm and 2 mg 2pm .      Melatonin 10 MG Oral Tab Take 2 tablets by mouth nightly.        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 No pertinent family history.   Social History:   Social History     Socioeconomic History    Marital status:    Tobacco Use    Smoking status: Former     Current packs/day: 0.00     Average packs/day: 0.5 packs/day for 60.0 years (30.0 ttl pk-yrs)     Types: Cigarettes     Start date: 2/5/2021     Quit date: 2/6/2021     Years since quitting: 3.2    Smokeless tobacco: Never    Tobacco comments:     Updated 12/12/23   Vaping Use    Vaping status: Never Used   Substance and Sexual Activity    Alcohol use: Not Currently     Comment: occasional    Drug use: Not Currently     Social Determinants of Health     Food Insecurity: Low Risk  (3/5/2024)    Received from [facility]    Food Insecurity     Have there been times that your food ran out, and you didn't have money to get more?: No     Are there times that you worry that this might happen?: No   Transportation Needs: Low Risk  (3/5/2024)    Received from [facility]    Transportation Needs     Do you have trouble getting transportation to medical appointments?: No      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   Exercise: minimal.  Diet: watches calories closely     REVIEW OF SYSTEMS:   GENERAL: feels well  otherwise  SKIN: denies any unusual skin lesions  EYES:denies blurred vision or double vision  HEENT: denies nasal congestion, sinus pain or ST  LUNGS: denies shortness of breath with exertion  CARDIOVASCULAR: denies chest pain on exertion  GI: denies abdominal pain,denies heartburn  : denies dysuria, vaginal discharge or itching,periods regular denies nocturia or changes in stream  MUSCULOSKELETAL: denies back pain, left hip pain, HX of OA hip left  NEURO: denies headaches  PSYCHE: denies depression or anxiety  HEMATOLOGIC: denies hx of anemia  ENDOCRINE: denies thyroid history, HX of  DM2  ALL/ASTHMA: denies hx of allergy or asthma    EXAM:   /82   Pulse 75   Temp 96.9 °F (36.1 °C)   Resp 18   Ht 5' 9.5\" (1.765 m)   Wt 186 lb (84.4 kg)   SpO2 95%   BMI 27.07 kg/m²   GENERAL: well developed, well nourished,in mild to moderate distress  SKIN: no rashes,no suspicious lesions  HEENT: atraumatic, normocephalic,ears and throat are clear  EYES:PERRLA, EOMI, normal optic disk,conjunctiva are clear  NECK: supple,no adenopathy,no bruits  CHEST: no chest tenderness  LUNGS: clear to auscultation  CARDIO: RRR without murmur  GI: good BS's,no masses, HSM or tenderness  MUSCULOSKELETAL: back is not tender,Restricted ROM of the back and left hip  EXTREMITIES: no cyanosis, clubbing or edema has faintly palpable pulses  NEURO: Oriented times three,cranial nerves are intact,motor and sensory are grossly intact, ambulates with a rolling walker    ASSESSMENT AND PLAN:     Encounter Diagnoses   Name Primary?    Preop examination     Osteoarthritis of one hip, left     Type 2 diabetes mellitus without complication, without long-term current use of insulin (HCC) Yes    Chronic respiratory failure with hypoxia, on home oxygen therapy (HCC)     Pulmonary fibrosis, postinflammatory (HCC)     CKD stage 3 due to type 2 diabetes mellitus (HCC)     Pseudomonas aeruginosa colonization        Orders Placed This Encounter    Procedures    CBC W Differential W Platelet [E]    Comp Metabolic Panel (14) [E]    Prothrombin Time (PT) [E]    PTT, Activated [E]   Stable cardiac and mediastinal contours.  Chronic mild elevation of the left hemidiaphragm with adjacent atelectasis or scar.  No pulmonary edema or acute airspace disease.  Few calcified granulomata noted throughout the lungs.  The pleural spaces are clear.     Jordan Valencia is a 78 year old male who presents for a pre-operative physical exam. Patient is to have . Pt has the following conditions: Pt has history of cardiac or pulmonary conditions. Has already gotten pulmonary clearance, await cardiac clearance. Pt is a good surgical candidate. This consult was sent back the referring physician, Dr. BRITTNY TRACEY.  MEDICALLY CLEAR FOR SURGERY

## 2024-05-10 ENCOUNTER — ORDER TRANSCRIPTION (OUTPATIENT)
Dept: PHYSICAL THERAPY | Facility: HOSPITAL | Age: 79
End: 2024-05-10

## 2024-05-10 DIAGNOSIS — M16.12 PRIMARY OSTEOARTHRITIS OF LEFT HIP: Primary | ICD-10-CM

## 2024-05-13 ENCOUNTER — TELEPHONE (OUTPATIENT)
Dept: FAMILY MEDICINE CLINIC | Facility: CLINIC | Age: 79
End: 2024-05-13

## 2024-05-13 DIAGNOSIS — E11.9 TYPE 2 DIABETES MELLITUS WITHOUT COMPLICATION, WITHOUT LONG-TERM CURRENT USE OF INSULIN (HCC): Primary | ICD-10-CM

## 2024-05-13 DIAGNOSIS — M16.12 OSTEOARTHRITIS OF ONE HIP, LEFT: ICD-10-CM

## 2024-05-13 DIAGNOSIS — Z01.818 PREOP EXAMINATION: ICD-10-CM

## 2024-05-13 NOTE — TELEPHONE ENCOUNTER
JESSIE FROM [facility] CALLED AND [facility] IS LOOKING FOR SOME TESTS FOR PTS UPCOMING SURGERY ON 5/28  :    A1C  MRSA SWAB   EKG    PLEASE CALL AND [facility]    THANK YOU

## 2024-05-14 ENCOUNTER — TELEPHONE (OUTPATIENT)
Dept: ORTHOPEDICS CLINIC | Facility: CLINIC | Age: 79
End: 2024-05-14

## 2024-05-14 DIAGNOSIS — M16.12 PRIMARY OSTEOARTHRITIS OF LEFT HIP: Primary | ICD-10-CM

## 2024-05-15 ENCOUNTER — TELEPHONE (OUTPATIENT)
Dept: FAMILY MEDICINE CLINIC | Facility: CLINIC | Age: 79
End: 2024-05-15

## 2024-05-15 NOTE — TELEPHONE ENCOUNTER
Spoke with Marleni, advised will send all documents when results are back. Marleni verbally understood    Spoke with Osiris, will be having A1c and MRSA swab done today or tomorrow.

## 2024-05-15 NOTE — TELEPHONE ENCOUNTER
Pl fax pt's EKG, labs, there was no A1C taken and no FRANCO\\/MRSA which pt needs. Pl fax [phone]

## 2024-05-16 ENCOUNTER — MED REC SCAN ONLY (OUTPATIENT)
Dept: ORTHOPEDICS CLINIC | Facility: CLINIC | Age: 79
End: 2024-05-16

## 2024-05-16 ENCOUNTER — LAB ENCOUNTER (OUTPATIENT)
Dept: LAB | Age: 79
End: 2024-05-16
Attending: ORTHOPAEDIC SURGERY

## 2024-05-16 DIAGNOSIS — E11.9 TYPE 2 DIABETES MELLITUS WITHOUT COMPLICATION, WITHOUT LONG-TERM CURRENT USE OF INSULIN (HCC): ICD-10-CM

## 2024-05-16 DIAGNOSIS — M16.12 OSTEOARTHRITIS OF ONE HIP, LEFT: ICD-10-CM

## 2024-05-16 DIAGNOSIS — Z01.818 PRE-OP TESTING: ICD-10-CM

## 2024-05-16 DIAGNOSIS — Z01.818 PREOP EXAMINATION: ICD-10-CM

## 2024-05-16 LAB
ANTIBODY SCREEN: NEGATIVE
EST. AVERAGE GLUCOSE BLD GHB EST-MCNC: 143 MG/DL (ref 68–126)
HBA1C MFR BLD: 6.6 % (ref ?–5.7)
RH BLOOD TYPE: POSITIVE

## 2024-05-16 PROCEDURE — 86900 BLOOD TYPING SEROLOGIC ABO: CPT

## 2024-05-16 PROCEDURE — 36415 COLL VENOUS BLD VENIPUNCTURE: CPT

## 2024-05-16 PROCEDURE — 87081 CULTURE SCREEN ONLY: CPT

## 2024-05-16 PROCEDURE — 83036 HEMOGLOBIN GLYCOSYLATED A1C: CPT

## 2024-05-16 PROCEDURE — 86901 BLOOD TYPING SEROLOGIC RH(D): CPT

## 2024-05-16 PROCEDURE — 86850 RBC ANTIBODY SCREEN: CPT

## 2024-05-20 NOTE — PROGRESS NOTES
Irene, his MRSA was negative 
REALLY nice job on blood sugar control, keep it up, MRSA pending
ZAKI Macedo

## 2024-05-23 ENCOUNTER — MED REC SCAN ONLY (OUTPATIENT)
Dept: FAMILY MEDICINE CLINIC | Facility: CLINIC | Age: 79
End: 2024-05-23

## 2024-05-24 ENCOUNTER — HOME HEALTH CHARGES (OUTPATIENT)
Dept: FAMILY MEDICINE CLINIC | Facility: CLINIC | Age: 79
End: 2024-05-24

## 2024-05-24 DIAGNOSIS — J44.1 CHRONIC OBSTRUCTIVE PULMONARY DISEASE WITH (ACUTE) EXACERBATION (HCC): Primary | ICD-10-CM

## 2024-05-28 ENCOUNTER — ANESTHESIA (OUTPATIENT)
Dept: SURGERY | Facility: HOSPITAL | Age: 79
DRG: 470 | End: 2024-05-28
Payer: MEDICARE

## 2024-05-28 ENCOUNTER — APPOINTMENT (OUTPATIENT)
Dept: GENERAL RADIOLOGY | Facility: HOSPITAL | Age: 79
DRG: 470 | End: 2024-05-28
Attending: PHYSICIAN ASSISTANT
Payer: MEDICARE

## 2024-05-28 ENCOUNTER — HOSPITAL ENCOUNTER (INPATIENT)
Facility: HOSPITAL | Age: 79
LOS: 2 days | Discharge: HOME HEALTH | DRG: 470 | End: 2024-05-30
Attending: ORTHOPAEDIC SURGERY | Admitting: ORTHOPAEDIC SURGERY
Payer: MEDICARE

## 2024-05-28 ENCOUNTER — ANESTHESIA EVENT (OUTPATIENT)
Dept: SURGERY | Facility: HOSPITAL | Age: 79
DRG: 470 | End: 2024-05-28
Payer: MEDICARE

## 2024-05-28 DIAGNOSIS — M16.12 PRIMARY OSTEOARTHRITIS OF LEFT HIP: ICD-10-CM

## 2024-05-28 DIAGNOSIS — Z01.818 PRE-OP TESTING: Primary | ICD-10-CM

## 2024-05-28 PROBLEM — Z79.4 TYPE 2 DIABETES MELLITUS WITHOUT COMPLICATION, WITH LONG-TERM CURRENT USE OF INSULIN (HCC): Status: ACTIVE | Noted: 2020-10-08

## 2024-05-28 PROBLEM — E11.9 TYPE 2 DIABETES MELLITUS WITHOUT COMPLICATION, WITH LONG-TERM CURRENT USE OF INSULIN (HCC): Status: ACTIVE | Noted: 2020-10-08

## 2024-05-28 PROBLEM — J44.9 CHRONIC OBSTRUCTIVE PULMONARY DISEASE (HCC): Status: ACTIVE | Noted: 2024-01-01

## 2024-05-28 PROBLEM — J44.9 CHRONIC OBSTRUCTIVE PULMONARY DISEASE (HCC): Status: ACTIVE | Noted: 2024-05-28

## 2024-05-28 LAB
GLUCOSE BLD-MCNC: 124 MG/DL (ref 70–99)
GLUCOSE BLD-MCNC: 216 MG/DL (ref 70–99)
GLUCOSE BLD-MCNC: 252 MG/DL (ref 70–99)

## 2024-05-28 PROCEDURE — 0SRB049 REPLACEMENT OF LEFT HIP JOINT WITH CERAMIC ON POLYETHYLENE SYNTHETIC SUBSTITUTE, CEMENTED, OPEN APPROACH: ICD-10-PCS | Performed by: ORTHOPAEDIC SURGERY

## 2024-05-28 PROCEDURE — 99222 1ST HOSP IP/OBS MODERATE 55: CPT | Performed by: HOSPITALIST

## 2024-05-28 PROCEDURE — 72170 X-RAY EXAM OF PELVIS: CPT | Performed by: PHYSICIAN ASSISTANT

## 2024-05-28 PROCEDURE — 27130 TOTAL HIP ARTHROPLASTY: CPT | Performed by: ORTHOPAEDIC SURGERY

## 2024-05-28 PROCEDURE — 27130 TOTAL HIP ARTHROPLASTY: CPT | Performed by: PHYSICIAN ASSISTANT

## 2024-05-28 DEVICE — IMPLANTABLE DEVICE
Type: IMPLANTABLE DEVICE | Site: HIP | Status: FUNCTIONAL
Brand: G7® DUAL MOBILITY ACETABULAR SYSTEM

## 2024-05-28 DEVICE — IMPLANTABLE DEVICE: Type: IMPLANTABLE DEVICE | Site: HIP | Status: FUNCTIONAL

## 2024-05-28 DEVICE — IMPLANTABLE DEVICE
Type: IMPLANTABLE DEVICE | Site: HIP | Status: FUNCTIONAL
Brand: G7® ACETABULAR SYSTEM

## 2024-05-28 DEVICE — FULL DOSE BONE CEMENT, 10 PACK CATALOG NUMBER IS 6191-1-010
Type: IMPLANTABLE DEVICE | Site: HIP | Status: FUNCTIONAL
Brand: SIMPLEX

## 2024-05-28 DEVICE — IMPLANTABLE DEVICE
Type: IMPLANTABLE DEVICE | Site: HIP | Status: FUNCTIONAL
Brand: VERSYS®

## 2024-05-28 DEVICE — IMPLANTABLE DEVICE
Type: IMPLANTABLE DEVICE | Site: HIP | Status: FUNCTIONAL
Brand: VIVACIT-E®

## 2024-05-28 DEVICE — BIOLOX® DELTA, CERAMIC FEMORAL HEAD, L, Ø 28/+3.5, TAPER 12/14
Type: IMPLANTABLE DEVICE | Site: HIP | Status: FUNCTIONAL
Brand: BIOLOX® DELTA

## 2024-05-28 DEVICE — IMPLANTABLE DEVICE
Type: IMPLANTABLE DEVICE | Site: HIP | Status: FUNCTIONAL
Brand: VERSYS HERITAGE®

## 2024-05-28 RX ORDER — NICOTINE POLACRILEX 4 MG
30 LOZENGE BUCCAL
Status: DISCONTINUED | OUTPATIENT
Start: 2024-05-28 | End: 2024-05-28 | Stop reason: HOSPADM

## 2024-05-28 RX ORDER — TAMSULOSIN HYDROCHLORIDE 0.4 MG/1
0.4 CAPSULE ORAL EVERY EVENING
Status: DISCONTINUED | OUTPATIENT
Start: 2024-05-28 | End: 2024-05-30

## 2024-05-28 RX ORDER — SODIUM CHLORIDE, SODIUM LACTATE, POTASSIUM CHLORIDE, CALCIUM CHLORIDE 600; 310; 30; 20 MG/100ML; MG/100ML; MG/100ML; MG/100ML
INJECTION, SOLUTION INTRAVENOUS CONTINUOUS
Status: DISCONTINUED | OUTPATIENT
Start: 2024-05-28 | End: 2024-05-28 | Stop reason: HOSPADM

## 2024-05-28 RX ORDER — BUPIVACAINE HYDROCHLORIDE 7.5 MG/ML
INJECTION, SOLUTION INTRASPINAL AS NEEDED
Status: DISCONTINUED | OUTPATIENT
Start: 2024-05-28 | End: 2024-05-28 | Stop reason: SURG

## 2024-05-28 RX ORDER — HYDROCODONE BITARTRATE AND ACETAMINOPHEN 5; 325 MG/1; MG/1
1 TABLET ORAL EVERY 6 HOURS PRN
COMMUNITY
End: 2024-05-30

## 2024-05-28 RX ORDER — DIPHENHYDRAMINE HCL 25 MG
25 CAPSULE ORAL EVERY 4 HOURS PRN
Status: DISCONTINUED | OUTPATIENT
Start: 2024-05-28 | End: 2024-05-30

## 2024-05-28 RX ORDER — ALBUTEROL SULFATE 2.5 MG/3ML
2.5 SOLUTION RESPIRATORY (INHALATION) EVERY 4 HOURS PRN
Status: DISCONTINUED | OUTPATIENT
Start: 2024-05-28 | End: 2024-05-30

## 2024-05-28 RX ORDER — OXYCODONE HYDROCHLORIDE 10 MG/1
10 TABLET ORAL EVERY 4 HOURS PRN
Status: DISCONTINUED | OUTPATIENT
Start: 2024-05-28 | End: 2024-05-30

## 2024-05-28 RX ORDER — ACETAMINOPHEN 325 MG/1
650 TABLET ORAL
Status: DISCONTINUED | OUTPATIENT
Start: 2024-05-28 | End: 2024-05-30

## 2024-05-28 RX ORDER — HYDROMORPHONE HYDROCHLORIDE 1 MG/ML
0.4 INJECTION, SOLUTION INTRAMUSCULAR; INTRAVENOUS; SUBCUTANEOUS EVERY 2 HOUR PRN
Status: DISCONTINUED | OUTPATIENT
Start: 2024-05-28 | End: 2024-05-30

## 2024-05-28 RX ORDER — ALBUTEROL SULFATE 90 UG/1
2 AEROSOL, METERED RESPIRATORY (INHALATION) EVERY 4 HOURS PRN
Status: DISCONTINUED | OUTPATIENT
Start: 2024-05-28 | End: 2024-05-30

## 2024-05-28 RX ORDER — ENEMA 19; 7 G/133ML; G/133ML
1 ENEMA RECTAL ONCE AS NEEDED
Status: DISCONTINUED | OUTPATIENT
Start: 2024-05-28 | End: 2024-05-30

## 2024-05-28 RX ORDER — MAGNESIUM OXIDE 400 MG (241.3 MG MAGNESIUM) TABLET
325 TABLET
Status: DISCONTINUED | OUTPATIENT
Start: 2024-05-29 | End: 2024-05-30

## 2024-05-28 RX ORDER — PANTOPRAZOLE SODIUM 20 MG/1
20 TABLET, DELAYED RELEASE ORAL
Status: DISCONTINUED | OUTPATIENT
Start: 2024-05-29 | End: 2024-05-30

## 2024-05-28 RX ORDER — IPRATROPIUM BROMIDE AND ALBUTEROL SULFATE 2.5; .5 MG/3ML; MG/3ML
3 SOLUTION RESPIRATORY (INHALATION) EVERY 6 HOURS PRN
Status: DISCONTINUED | OUTPATIENT
Start: 2024-05-28 | End: 2024-05-30

## 2024-05-28 RX ORDER — ONDANSETRON 2 MG/ML
INJECTION INTRAMUSCULAR; INTRAVENOUS AS NEEDED
Status: DISCONTINUED | OUTPATIENT
Start: 2024-05-28 | End: 2024-05-28 | Stop reason: SURG

## 2024-05-28 RX ORDER — TRANEXAMIC ACID 10 MG/ML
1000 INJECTION, SOLUTION INTRAVENOUS ONCE
Status: COMPLETED | OUTPATIENT
Start: 2024-05-28 | End: 2024-05-28

## 2024-05-28 RX ORDER — MIDAZOLAM HYDROCHLORIDE 1 MG/ML
INJECTION INTRAMUSCULAR; INTRAVENOUS AS NEEDED
Status: DISCONTINUED | OUTPATIENT
Start: 2024-05-28 | End: 2024-05-28 | Stop reason: SURG

## 2024-05-28 RX ORDER — HYDROCODONE BITARTRATE AND ACETAMINOPHEN 5; 325 MG/1; MG/1
2 TABLET ORAL ONCE AS NEEDED
Status: DISCONTINUED | OUTPATIENT
Start: 2024-05-28 | End: 2024-05-28 | Stop reason: HOSPADM

## 2024-05-28 RX ORDER — SENNOSIDES 8.6 MG
17.2 TABLET ORAL NIGHTLY
Status: DISCONTINUED | OUTPATIENT
Start: 2024-05-28 | End: 2024-05-30

## 2024-05-28 RX ORDER — ASPIRIN 81 MG/1
81 TABLET ORAL 2 TIMES DAILY
Status: DISCONTINUED | OUTPATIENT
Start: 2024-05-28 | End: 2024-05-30

## 2024-05-28 RX ORDER — ACETAMINOPHEN 325 MG/1
TABLET ORAL
Status: COMPLETED
Start: 2024-05-28 | End: 2024-05-28

## 2024-05-28 RX ORDER — ONDANSETRON 2 MG/ML
4 INJECTION INTRAMUSCULAR; INTRAVENOUS EVERY 6 HOURS PRN
Status: DISCONTINUED | OUTPATIENT
Start: 2024-05-28 | End: 2024-05-28 | Stop reason: HOSPADM

## 2024-05-28 RX ORDER — CLONAZEPAM 0.5 MG/1
0.5 TABLET ORAL NIGHTLY
Status: DISCONTINUED | OUTPATIENT
Start: 2024-05-28 | End: 2024-05-30

## 2024-05-28 RX ORDER — METOCLOPRAMIDE HYDROCHLORIDE 5 MG/ML
10 INJECTION INTRAMUSCULAR; INTRAVENOUS EVERY 8 HOURS PRN
Status: DISCONTINUED | OUTPATIENT
Start: 2024-05-28 | End: 2024-05-30

## 2024-05-28 RX ORDER — DOCUSATE SODIUM 100 MG/1
100 CAPSULE, LIQUID FILLED ORAL 2 TIMES DAILY
Status: DISCONTINUED | OUTPATIENT
Start: 2024-05-28 | End: 2024-05-30

## 2024-05-28 RX ORDER — DEXTROSE MONOHYDRATE 25 G/50ML
50 INJECTION, SOLUTION INTRAVENOUS
Status: DISCONTINUED | OUTPATIENT
Start: 2024-05-28 | End: 2024-05-28 | Stop reason: HOSPADM

## 2024-05-28 RX ORDER — NICOTINE POLACRILEX 4 MG
15 LOZENGE BUCCAL
Status: DISCONTINUED | OUTPATIENT
Start: 2024-05-28 | End: 2024-05-28 | Stop reason: HOSPADM

## 2024-05-28 RX ORDER — SODIUM CHLORIDE, SODIUM LACTATE, POTASSIUM CHLORIDE, CALCIUM CHLORIDE 600; 310; 30; 20 MG/100ML; MG/100ML; MG/100ML; MG/100ML
INJECTION, SOLUTION INTRAVENOUS CONTINUOUS
Status: DISCONTINUED | OUTPATIENT
Start: 2024-05-28 | End: 2024-05-30

## 2024-05-28 RX ORDER — ONDANSETRON 2 MG/ML
4 INJECTION INTRAMUSCULAR; INTRAVENOUS EVERY 6 HOURS PRN
Status: DISCONTINUED | OUTPATIENT
Start: 2024-05-28 | End: 2024-05-30

## 2024-05-28 RX ORDER — METOCLOPRAMIDE HYDROCHLORIDE 5 MG/ML
10 INJECTION INTRAMUSCULAR; INTRAVENOUS EVERY 8 HOURS PRN
Status: DISCONTINUED | OUTPATIENT
Start: 2024-05-28 | End: 2024-05-28 | Stop reason: HOSPADM

## 2024-05-28 RX ORDER — CEPHALEXIN 500 MG/1
500 CAPSULE ORAL EVERY 8 HOURS SCHEDULED
Status: DISCONTINUED | OUTPATIENT
Start: 2024-05-29 | End: 2024-05-30

## 2024-05-28 RX ORDER — PHENYLEPHRINE HCL 10 MG/ML
VIAL (ML) INJECTION AS NEEDED
Status: DISCONTINUED | OUTPATIENT
Start: 2024-05-28 | End: 2024-05-28 | Stop reason: SURG

## 2024-05-28 RX ORDER — ACETAMINOPHEN 325 MG/1
650 TABLET ORAL ONCE
Status: COMPLETED | OUTPATIENT
Start: 2024-05-28 | End: 2024-05-28

## 2024-05-28 RX ORDER — ACETAMINOPHEN 500 MG
1000 TABLET ORAL ONCE
Status: DISCONTINUED | OUTPATIENT
Start: 2024-05-28 | End: 2024-05-28 | Stop reason: HOSPADM

## 2024-05-28 RX ORDER — POLYETHYLENE GLYCOL 3350 17 G/17G
17 POWDER, FOR SOLUTION ORAL DAILY PRN
Status: DISCONTINUED | OUTPATIENT
Start: 2024-05-28 | End: 2024-05-30

## 2024-05-28 RX ORDER — INSULIN ASPART 100 [IU]/ML
INJECTION, SOLUTION INTRAVENOUS; SUBCUTANEOUS ONCE
Status: COMPLETED | OUTPATIENT
Start: 2024-05-28 | End: 2024-05-28

## 2024-05-28 RX ORDER — BISACODYL 10 MG
10 SUPPOSITORY, RECTAL RECTAL
Status: DISCONTINUED | OUTPATIENT
Start: 2024-05-28 | End: 2024-05-30

## 2024-05-28 RX ORDER — FLUTICASONE FUROATE AND VILANTEROL 200; 25 UG/1; UG/1
1 POWDER RESPIRATORY (INHALATION) DAILY
Status: DISCONTINUED | OUTPATIENT
Start: 2024-05-29 | End: 2024-05-30

## 2024-05-28 RX ORDER — ALBUTEROL SULFATE 90 UG/1
1 AEROSOL, METERED RESPIRATORY (INHALATION) 2 TIMES DAILY
Status: DISCONTINUED | OUTPATIENT
Start: 2024-05-28 | End: 2024-05-30

## 2024-05-28 RX ORDER — MEPERIDINE HYDROCHLORIDE 25 MG/ML
12.5 INJECTION INTRAMUSCULAR; INTRAVENOUS; SUBCUTANEOUS AS NEEDED
Status: DISCONTINUED | OUTPATIENT
Start: 2024-05-28 | End: 2024-05-28 | Stop reason: HOSPADM

## 2024-05-28 RX ORDER — HYDROMORPHONE HYDROCHLORIDE 1 MG/ML
0.2 INJECTION, SOLUTION INTRAMUSCULAR; INTRAVENOUS; SUBCUTANEOUS EVERY 5 MIN PRN
Status: DISCONTINUED | OUTPATIENT
Start: 2024-05-28 | End: 2024-05-28 | Stop reason: HOSPADM

## 2024-05-28 RX ORDER — HYDROMORPHONE HYDROCHLORIDE 1 MG/ML
0.4 INJECTION, SOLUTION INTRAMUSCULAR; INTRAVENOUS; SUBCUTANEOUS EVERY 5 MIN PRN
Status: DISCONTINUED | OUTPATIENT
Start: 2024-05-28 | End: 2024-05-28 | Stop reason: HOSPADM

## 2024-05-28 RX ORDER — HYDROCODONE BITARTRATE AND ACETAMINOPHEN 5; 325 MG/1; MG/1
1 TABLET ORAL ONCE AS NEEDED
Status: DISCONTINUED | OUTPATIENT
Start: 2024-05-28 | End: 2024-05-28 | Stop reason: HOSPADM

## 2024-05-28 RX ORDER — LABETALOL HYDROCHLORIDE 5 MG/ML
5 INJECTION, SOLUTION INTRAVENOUS EVERY 5 MIN PRN
Status: DISCONTINUED | OUTPATIENT
Start: 2024-05-28 | End: 2024-05-28 | Stop reason: HOSPADM

## 2024-05-28 RX ORDER — DIPHENHYDRAMINE HYDROCHLORIDE 50 MG/ML
12.5 INJECTION INTRAMUSCULAR; INTRAVENOUS AS NEEDED
Status: DISCONTINUED | OUTPATIENT
Start: 2024-05-28 | End: 2024-05-28 | Stop reason: HOSPADM

## 2024-05-28 RX ORDER — DEXTROSE MONOHYDRATE 25 G/50ML
50 INJECTION, SOLUTION INTRAVENOUS
Status: DISCONTINUED | OUTPATIENT
Start: 2024-05-28 | End: 2024-05-30

## 2024-05-28 RX ORDER — HYDROMORPHONE HYDROCHLORIDE 1 MG/ML
0.6 INJECTION, SOLUTION INTRAMUSCULAR; INTRAVENOUS; SUBCUTANEOUS EVERY 5 MIN PRN
Status: DISCONTINUED | OUTPATIENT
Start: 2024-05-28 | End: 2024-05-28 | Stop reason: HOSPADM

## 2024-05-28 RX ORDER — KETOROLAC TROMETHAMINE 30 MG/ML
15 INJECTION, SOLUTION INTRAMUSCULAR; INTRAVENOUS EVERY 6 HOURS
Status: DISPENSED | OUTPATIENT
Start: 2024-05-28 | End: 2024-05-30

## 2024-05-28 RX ORDER — TRAMADOL HYDROCHLORIDE 50 MG/1
50 TABLET ORAL EVERY 12 HOURS SCHEDULED
Status: DISCONTINUED | OUTPATIENT
Start: 2024-05-28 | End: 2024-05-30

## 2024-05-28 RX ORDER — DIPHENHYDRAMINE HYDROCHLORIDE 50 MG/ML
25 INJECTION INTRAMUSCULAR; INTRAVENOUS ONCE AS NEEDED
Status: ACTIVE | OUTPATIENT
Start: 2024-05-28 | End: 2024-05-28

## 2024-05-28 RX ORDER — DIPHENHYDRAMINE HYDROCHLORIDE 50 MG/ML
12.5 INJECTION INTRAMUSCULAR; INTRAVENOUS EVERY 4 HOURS PRN
Status: DISCONTINUED | OUTPATIENT
Start: 2024-05-28 | End: 2024-05-30

## 2024-05-28 RX ORDER — NALOXONE HYDROCHLORIDE 0.4 MG/ML
80 INJECTION, SOLUTION INTRAMUSCULAR; INTRAVENOUS; SUBCUTANEOUS AS NEEDED
Status: DISCONTINUED | OUTPATIENT
Start: 2024-05-28 | End: 2024-05-28 | Stop reason: HOSPADM

## 2024-05-28 RX ORDER — FINASTERIDE 5 MG/1
5 TABLET, FILM COATED ORAL DAILY
Status: DISCONTINUED | OUTPATIENT
Start: 2024-05-29 | End: 2024-05-30

## 2024-05-28 RX ORDER — OXYCODONE HYDROCHLORIDE 5 MG/1
5 TABLET ORAL EVERY 4 HOURS PRN
Status: DISCONTINUED | OUTPATIENT
Start: 2024-05-28 | End: 2024-05-30

## 2024-05-28 RX ORDER — NICOTINE POLACRILEX 4 MG
15 LOZENGE BUCCAL
Status: DISCONTINUED | OUTPATIENT
Start: 2024-05-28 | End: 2024-05-30

## 2024-05-28 RX ORDER — HYDROMORPHONE HYDROCHLORIDE 1 MG/ML
0.8 INJECTION, SOLUTION INTRAMUSCULAR; INTRAVENOUS; SUBCUTANEOUS EVERY 2 HOUR PRN
Status: DISCONTINUED | OUTPATIENT
Start: 2024-05-28 | End: 2024-05-30

## 2024-05-28 RX ORDER — NICOTINE POLACRILEX 4 MG
30 LOZENGE BUCCAL
Status: DISCONTINUED | OUTPATIENT
Start: 2024-05-28 | End: 2024-05-30

## 2024-05-28 RX ORDER — LIDOCAINE HYDROCHLORIDE 10 MG/ML
INJECTION, SOLUTION EPIDURAL; INFILTRATION; INTRACAUDAL; PERINEURAL AS NEEDED
Status: DISCONTINUED | OUTPATIENT
Start: 2024-05-28 | End: 2024-05-28 | Stop reason: SURG

## 2024-05-28 RX ORDER — ROPINIROLE 1 MG/1
2 TABLET, FILM COATED ORAL 2 TIMES DAILY
Status: DISCONTINUED | OUTPATIENT
Start: 2024-05-28 | End: 2024-05-30

## 2024-05-28 RX ORDER — INSULIN ASPART 100 [IU]/ML
INJECTION, SOLUTION INTRAVENOUS; SUBCUTANEOUS
Status: COMPLETED
Start: 2024-05-28 | End: 2024-05-28

## 2024-05-28 RX ORDER — ACETAMINOPHEN 500 MG
1000 TABLET ORAL ONCE AS NEEDED
Status: DISCONTINUED | OUTPATIENT
Start: 2024-05-28 | End: 2024-05-28 | Stop reason: HOSPADM

## 2024-05-28 RX ADMIN — PHENYLEPHRINE HCL 50 MCG: 10 MG/ML VIAL (ML) INJECTION at 14:56:00

## 2024-05-28 RX ADMIN — SODIUM CHLORIDE, SODIUM LACTATE, POTASSIUM CHLORIDE, CALCIUM CHLORIDE: 600; 310; 30; 20 INJECTION, SOLUTION INTRAVENOUS at 14:59:00

## 2024-05-28 RX ADMIN — ONDANSETRON 4 MG: 2 INJECTION INTRAMUSCULAR; INTRAVENOUS at 13:15:00

## 2024-05-28 RX ADMIN — BUPIVACAINE HYDROCHLORIDE 2 ML: 7.5 INJECTION, SOLUTION INTRASPINAL at 13:21:00

## 2024-05-28 RX ADMIN — TRANEXAMIC ACID 1000 MG: 10 INJECTION, SOLUTION INTRAVENOUS at 13:27:00

## 2024-05-28 RX ADMIN — MIDAZOLAM HYDROCHLORIDE 1 MG: 1 INJECTION INTRAMUSCULAR; INTRAVENOUS at 13:16:00

## 2024-05-28 RX ADMIN — PHENYLEPHRINE HCL 50 MCG: 10 MG/ML VIAL (ML) INJECTION at 14:52:00

## 2024-05-28 RX ADMIN — ONDANSETRON 300 MG: 2 INJECTION INTRAMUSCULAR; INTRAVENOUS at 13:40:00

## 2024-05-28 RX ADMIN — MIDAZOLAM HYDROCHLORIDE 1 MG: 1 INJECTION INTRAMUSCULAR; INTRAVENOUS at 13:14:00

## 2024-05-28 RX ADMIN — LIDOCAINE HYDROCHLORIDE 50 MG: 10 INJECTION, SOLUTION EPIDURAL; INFILTRATION; INTRACAUDAL; PERINEURAL at 13:32:00

## 2024-05-28 RX ADMIN — SODIUM CHLORIDE, SODIUM LACTATE, POTASSIUM CHLORIDE, CALCIUM CHLORIDE: 600; 310; 30; 20 INJECTION, SOLUTION INTRAVENOUS at 14:26:00

## 2024-05-28 RX ADMIN — SODIUM CHLORIDE, SODIUM LACTATE, POTASSIUM CHLORIDE, CALCIUM CHLORIDE: 600; 310; 30; 20 INJECTION, SOLUTION INTRAVENOUS at 13:14:00

## 2024-05-28 NOTE — ANESTHESIA PROCEDURE NOTES
Spinal Block    Date/Time: 5/28/2024 1:15 PM    Performed by: Braden Ayala MD  Authorized by: Braden Ayala MD      General Information and Staff    Start Time:  5/28/2024 1:15 PM  End Time:  5/28/2024 1:21 PM  Anesthesiologist:  Braden Ayala MD  Performed by:  Anesthesiologist  Site identification: surface landmarks    Preanesthetic Checklist: patient identified, IV checked, risks and benefits discussed, monitors and equipment checked, pre-op evaluation, timeout performed, anesthesia consent and sterile technique used      Procedure Details    Patient Position:  Sitting  Prep: ChloraPrep    Monitoring:  Cardiac monitor, heart rate and continuous pulse ox  Approach:  Midline  Location:  L3-4  Injection Technique:  Single-shot    Needle    Needle Type:  Sprotte  Needle Gauge:  24 G  Needle Length:  3.5 in    Assessment    Sensory Level:  T10  Events: clear CSF, CSF aspirated, well tolerated and blood negative      Additional Comments

## 2024-05-28 NOTE — DISCHARGE INSTRUCTIONS
Sometimes managing your health at home requires assistance.  The [facility] team has recognized your preference to use [facility]. They can be reached by phone at [phone]. The fax number for your reference is [phone]. . A representative from the home health agency will contact you or your family to schedule your first visit.            Joint Replacement Surgery: Patient Discharge Instructions   The best way to contact your surgeon or their team is by phone [phone] (preferred for urgent/acute matters) or through [facility].    What to Expect:  A certain amount of pain, swelling, and bruising is expected for several weeks after surgery.    Pain Medications:   Below is a list of commonly prescribed pain medications. You may have received prescriptions for some, all, or even additional pain medications not listed.  If you are taking the following medications for pain, these are some general guidelines for using and discontinuing them.  You may also want to preemptively take your pain medication before physical therapy (at least 30 minutes prior to the session).  If you are concerned you are suffering side effects from any of these medications please contact your surgeon’s office:    Ultram (Tramadol): Take this as prescribed 3 times per day with meals until your first postoperative appointment. If your pain levels are low, you can stop taking this on a scheduled basis and start taking it as needed.Tylenol (Acetaminophen): Take this as prescribed 3 times per day until your first postoperative appointment. Do NOT exceed 4g (4000mg) of acetaminophen daily.Celebrex (Celecoxib): this medication will treat swelling and pain and should be taken as directed until your first postoperative appointment.Oxycodone IR (immediate release): This is your “rescue” drug. Take this only as needed for pain that is not controlled (rated more than 4 out of 10) by other medications.You may wean yourself off  prescription pain medication to a non-prescription pain reliever by substituting two 500mg extra-strength Tylenol (Acetaminophen) tablets in place of your prescription pain medications up to four times per day. Do NOT exceed 4g (4000mg) of acetaminophen daily.*We will discuss pain management and weaning off pain medicine at your 1st postoperative appointment. To avoid delays in getting your narcotic pain medicine refilled, please contact the office prior to running out of medication either by phone [phone] or through [facility]. Please allow 24-72 hours for prescriptions to be filled. Your doctor may need to physically sign a hand-written prescription -- some medicines cannot be re-ordered electronically/or via phone. If you need to  a hand-written prescription, you can do so at the office located at [address] [place] [address], [address].  Pain Medications can be constipating. Below is a guideline for preventing or managing postoperative constipation:  Drink plenty of fluids. Aim to drink at least of 8oz of water 8 times per day (total of 64oz).  Eat a high-fiber diet (whatever helps you stay regular).  Make sure you are taking Senekot S (Docusate Sodium + Sennosides) or Colace (Docusate Sodium), 1-2 tablets twice daily, while on narcotics. You may decrease to once daily if you develop diarrhea. You will be sent home with a prescription.  If you have not had normal bowel movements the following over the counter medications can be helpful:  Add daily Miralax™ or Metamucil™ as directed on bottle.  If still no results, add a dose of Milk of Magnesia™ as directed on bottle.  If still no results, take one Dulcolax™ (Bisacodyl) suppository as directed on package.  If you still have no results and it has been more than 3 days since you had a bowel movement, be sure to contact your surgeon's office.    Swelling:  You may apply a cloth covered ice pack for up to 20 minutes each hour, or as needed, to your  incision to help control pain and swelling. Do not apply directly to your skin.  For the first 7 days after you leave the hospital, it is critical that you elevate your leg above the level of your heart 4 times per day for 1 hour each time. After a week, elevate your leg as needed if swelling is noted.   Call your Surgeons office if you have:  A temperature greater than 100.4 F.  Increasing pain or numbness at the incision or on your operated leg.  Increasing redness, drainage, or odor from the wound.  You WILL be swollen the first week or two after surgery; however, swelling that continues to get worse to your surgical leg or the opposite leg and is accompanied by discomfort or redness should be reported.  **Go to the Emergency Room if you have chest pain or shortness of breath**  Activities:  Your activity order is:   Weight bearing as tolerated  If you have home health care, a physical therapist (PT) will come to your home 2-3 times per week. The number of PT visits will depend on your needs and your insurance coverage.   At your first postoperative appointment with your surgeon, you will be given a prescription for outpatient physical therapy if you have not already started outpatient therapy.  Rules of the road: No driving until it is approved by your care team, and you are no longer taking narcotic pain medicine.  For Total Hip Replacement Patients Only:   Posterior hip precautions x 6 weeks postoperatively: no hip flexion beyond 90 degrees, no internal rotation of the hip (toes should always face forward or outwards, not inwards), no crossing of the legs, no sitting in low chairs or on a low toilet, no bending forward to pick things up or put on socks/shoes    Wound Care/ Bathing:   Aquacel (waterproof brown rubberized dressing) should remain in place for 7 days and may then be removed. While this waterproof dressing is in place, you may shower and let water run over the dressing (ensure first that the  dressing seal is intact and none of the edges have rolled up exposing the wound - if the dressing has become open to the environment, do not allow water to enter into this area)  Once the original postoperative dressing is removed, you do not need to keep your incision covered. If you would like to keep it covered for comfort you may - use a clean new dressing each day, or more frequently if needed (if the dressing is soiled or it is not staying fixed to your skin). Use the dressings suggested by the nurse at discharge.  After the original postoperative dressing is removed, you should continue to keep the incision covered when showering to prevent it from getting wet prior to your first postoperative appointment. Before showering, be sure to cover the incision with saran wrap or a plastic bag to keep dry. After showering, pat the incision with a clean towel to ensure it is dry. Do this until you are cleared to get the incision wet (usually after the first postoperative appointment).  Once cleared to get the incision wet, you may gently allow soap and water to rinse over the area. Be sure to rinse the area well and gently pat dry.  Do not apply soap, lotion, cream, ointments, or powders to your incision. Keep the incision clean and dry.  Do not soak your incision in water. No tub baths, pools or hot tubs for at least 6 weeks after surgery and not until the wound is completely healed.  If you have stitches or staples, they will be removed at your first postoperative appointment or the week after. Do not remove steri-strips, if you have any. These will come off on their own and do not need to be replaced.  If you have a home care nurse they should:  Examine the incision during visits and call your surgeon if there are any signs of infection or other cause for concern.  Change your dressing and may remove staples (when indicated).  Take your vitals and draw your labs.    Remember, good hand washing with soap at all  times helps prevent infections. Wash your hands with soap and water prior to performing any dressing changes or wound evaluation.    Medications/Special Instructions:  Continue one daily Ensure Enlive shake for protein and caloric content to help improve bone and wound healing postoperatively.  Do not restart your blood pressure medication until cleared by your physician, or until your systolic blood pressure (top number) is above 130 on 2 consecutive checks and then continue your medication as prescribed.  If any of your medications for inflammatory arthritis (rheumatoid, psoriatic, ankylosing spondylitis, spondyloarthritis, juvenile idiopathic arthritis, lupus) were stopped for surgery, do not restart them until directed to do so at your postoperative clinic visit.    Antibiotics:  An oral/by-mouth antibiotic tablet has been ordered for you to take for one week postoperatively. Be sure to take this medication three times daily for one week as prescribed.    Blood Thinners (you will be on blood thinners for a total of six weeks):  Aspirin: some patients will be prescribed Aspirin to prevent blood clots after surgery. If you are prescribed aspirin, take one 81mg tablet by mouth twice per day for six weeks.  DO NOT restart hiytc3z/fish oils, glucosamine, nonsteroidal antiinflammatories [NSAIDs, such as such as Ibuprofen (Advil, Motrin), Naproxen (Naprosyn, Aleve), Diclofenac (Voltaren), Meloxicam (Mobic)], tumeric, cinnamon, progesterone, estrogen, or Aspirin (unless told differently) until you have completed your blood thinner. These will increase your risk of bleeding.   Protonix (Omeprazole/Nexium, etc.) may be ordered if Aspirin is to be continued while on your blood thinner and should be taken daily during that time. This will help to protect your stomach.    X-Ray  X-rays needed: None      If you do not have a follow-up appointment with your surgeon, or you need to change your follow-up appointment date/time,  please call today to schedule or change this appointment: [phone]. Our phones are open to schedule appointments from 8:30am to 4:30pm, Monday through Friday.  If you have any questions or concerns during the postoperative period (for example: wound issues, pain, swelling, redness or drainage) call our office FIRST at [phone] so that we may appropriately direct you or set up a clinic appointment.    Spanda- hip replacement(single layer compression sleeve):     All patients should wear the compression sleeve until first follow up visit to surgeon’s office (about 2-3 weeks) and then check with surgeon if need to continue use.  Take off to shower. Best to keep on as much as possible, even at night, except when washing out.  Wash with mild soap and water; DRIP dry overnight- put back on before getting up for the day.  Use one long tube on the calf.   It should end up just below the back of the knee and the other end on the ball of the foot to expose the toes.   Makes sure it is NOT bunched up anywhere.  IF the Spanda- feels TOO tight, then REMOVE it and call your surgeon to let them know.

## 2024-05-28 NOTE — ANESTHESIA PREPROCEDURE EVALUATION
PRE-OP EVALUATION    Patient Name: Jordan Valencia    Admit Diagnosis: Primary osteoarthritis of left hip [M16.12]    Pre-op Diagnosis: Primary osteoarthritis of left hip [M16.12]    LEFT POSTERIOR CEMENTED TOTAL HIP ARTHROPLASTY    Anesthesia Procedure: LEFT POSTERIOR CEMENTED TOTAL HIP ARTHROPLASTY (Left)    Surgeons and Role:     * Greg Manuel MD - Primary    Pre-op vitals reviewed.  Temp: 97.5 °F (36.4 °C)  Pulse: 56  Resp: 18  BP: 159/88  SpO2: 99 %  Body mass index is 25.94 kg/m².    Current medications reviewed.  Hospital Medications:   [Transfer Hold] acetaminophen (Tylenol Extra Strength) tab 1,000 mg  1,000 mg Oral Once    [Transfer Hold] glucose (Dex4) 15 GM/59ML oral liquid 15 g  15 g Oral Q15 Min PRN    Or    [Transfer Hold] glucose (Glutose) 40% oral gel 15 g  15 g Oral Q15 Min PRN    Or    [Transfer Hold] glucose-vitamin C (Dex-4) chewable tab 4 tablet  4 tablet Oral Q15 Min PRN    Or    [Transfer Hold] dextrose 50% injection 50 mL  50 mL Intravenous Q15 Min PRN    Or    [Transfer Hold] glucose (Dex4) 15 GM/59ML oral liquid 30 g  30 g Oral Q15 Min PRN    Or    [Transfer Hold] glucose (Glutose) 40% oral gel 30 g  30 g Oral Q15 Min PRN    Or    [Transfer Hold] glucose-vitamin C (Dex-4) chewable tab 8 tablet  8 tablet Oral Q15 Min PRN    lactated ringers infusion   Intravenous Continuous    [COMPLETED] tranexamic acid in sodium chloride 0.7% (Cyklokapron) 1000 mg/100mL infusion premix 1,000 mg  1,000 mg Intravenous Once    [COMPLETED] ceFAZolin (Ancef) 2g in 10mL IV syringe premix  2 g Intravenous Once    EPINEPHrine (Adrenalin) 0.5 mg in sodium chloride 0.9% 100 mL irrigation    PRN    clonidine/epinephrine/ropivacaine/ketorolac in 0.9% NaCl 60 mL pain cocktail syringe for hip arthroplasty    PRN    povidone-iodine (Betadine) 10 % 17.5 mL in sodium chloride 0.9 % 500 mL irrigation    PRN       Outpatient Medications:     Medications Prior to Admission   Medication Sig Dispense Refill  Last Dose    HYDROcodone-acetaminophen 5-325 MG Oral Tab Take 1 tablet by mouth every 6 (six) hours as needed for Pain.   5/26/2024    rOPINIRole 1 MG Oral Tab Take 2 tablets (2 mg total) by mouth 2 (two) times daily.   5/27/2024 at 2100    glipiZIDE 10 MG Oral Tab Take 1 tablet (10 mg total) by mouth 2 (two) times daily before meals.   5/27/2024 at 2100    BREO ELLIPTA 200-25 MCG/ACT Inhalation Aerosol Powder, Breath Activated INHALE 1 PUFF INTO THE LUNGS DAILY 60 each 5 5/28/2024 at 0900    Ferrous Sulfate 325 (65 Fe) MG Oral Tab Take 1 tablet (325 mg total) by mouth daily with breakfast. Every other day   5/28/2024 at 0900    Alogliptin Benzoate 25 MG Oral Tab 25 mg.   5/28/2024 at 0900    Empagliflozin 25 MG Oral Tab Take 1 tablet by mouth daily.   5/23/2024    ergocalciferol 1.25 MG (93031 UT) Oral Cap        ALBUTEROL (2.5 MG/3ML) 0.083% Inhalation Nebu Soln INHALE 3 ML BY NEBULIZATION EVERY 4 HOURS AS NEEDED FOR WHEEZING 75 mL 3 Past Month    ipratropium-albuterol (COMBIVENT RESPIMAT)  MCG/ACT Inhalation Aero Soln Inhale 1 spray into the lungs 2 (two) times daily.   5/28/2024 at 0900    omeprazole 20 MG Oral Capsule Delayed Release Take 2 capsules (40 mg total) by mouth daily.   5/28/2024 at 0900    aspirin 81 MG Oral Tab EC Take 1 tablet (81 mg total) by mouth daily.   5/17/2024    finasteride 5 MG Oral Tab Take 1 tablet (5 mg total) by mouth daily.   5/28/2024 at 0900    metoprolol succinate 25 MG Oral Tablet 24 Hr Take 0.5 tablets (12.5 mg total) by mouth daily.   5/27/2024 at 2100    cholecalciferol 25 MCG (1000 UT) Oral Tab Take 1 tablet (1,000 Units total) by mouth daily.       clonazePAM 0.5 MG Oral Tab Take 1 tablet (0.5 mg total) by mouth nightly.   5/27/2024 at 2100    atorvastatin 40 MG Oral Tab Take 1.5 tablets (60 mg total) by mouth daily.   5/27/2024 at 2100    [place]-3 1000 MG Oral Cap Take 1 capsule by mouth daily.   5/17/2024    tamsulosin (FLOMAX) cap Take 1 capsule (0.4 mg total) by  mouth every evening.   5/27/2024 at 2100    Melatonin 10 MG Oral Tab Take 2 tablets by mouth nightly.       ipratropium-albuterol 0.5-2.5 (3) MG/3ML Inhalation Solution Take 3 mL by nebulization every 6 (six) hours as needed. 90 mL 5     guaiFENesin  MG Oral Tablet 12 Hr Take 1 tablet (600 mg total) by mouth 2 (two) times daily.       Glucose Blood (ONETOUCH VERIO) In Vitro Strip USE TO TEST TWICE DAILY 200 strip 1     traMADol 50 MG Oral Tab Take 1-2 tabs every 8 hours as needed for hip pain 60 tablet 0 More than a month    albuterol 108 (90 Base) MCG/ACT Inhalation Aero Soln Inhale 2 puffs into the lungs every 4 (four) hours as needed. 2 each 2 More than a month    Lancets (ONETOUCH DELICA PLUS [id number]) Does not apply Misc 1 lancet  by Finger stick route 2 (two) times daily. Dx E11.9 non insulin dependent 200 each 1     Blood Glucose Monitoring Suppl (ONETOUCH VERIO REFLECT) w/Device Does not apply Kit 1 lancet by Finger stick route 2 (two) times daily. Please send what is covered under patient formulary. DX  E11.9 DM2 controlled w/o insulin 1 kit 0        Allergies: Gabapentin, Gemfibrozil, Bee venom, and Lisinopril      Anesthesia Evaluation        Anesthetic Complications  (-) history of anesthetic complications         GI/Hepatic/Renal      (+) GERD and well controlled      (+) chronic renal disease and CRI  (+) liver disease                 Cardiovascular      ECG reviewed.  Exercise tolerance: poor       Unable to assess MET.    (+) hypertension and well controlled    (+) CAD  (+) past MI                (-) angina     (+) ENRIQUEZ  (-) orthopnea  (-) PND     Endo/Other      (+) diabetes and poorly controlled, type 2,                   (+) arthritis       Pulmonary        (+) COPD and mild  COPD requiring home oxygen.    (+) shortness of breath  (-) recent URI   (-) sleep apnea       Neuro/Psych                 (+) neuromuscular disease                     Past Surgical History:   Procedure Laterality Date     Angiogram  06/18/2021    Dr Schneider    Appendectomy      Lumbar spine surgery      Transurethral destruction, prostate tissue; water-induc       Social History     Socioeconomic History    Marital status:    Tobacco Use    Smoking status: Former     Current packs/day: 0.00     Average packs/day: 0.5 packs/day for 60.0 years (30.0 ttl pk-yrs)     Types: Cigarettes     Start date: 2/5/2021     Quit date: 2/6/2021     Years since quitting: 3.3    Smokeless tobacco: Never    Tobacco comments:     Updated 12/12/23   Vaping Use    Vaping status: Never Used   Substance and Sexual Activity    Alcohol use: Not Currently     Comment: occasional    Drug use: Not Currently     History   Drug Use Unknown     Available pre-op labs reviewed.  Lab Results   Component Value Date    WBC 9.3 05/08/2024    RBC 5.17 05/08/2024    HGB 13.2 05/08/2024    HCT 42.9 05/08/2024    MCV 83.0 05/08/2024    MCH 25.5 (L) 05/08/2024    MCHC 30.8 (L) 05/08/2024    RDW 17.2 05/08/2024    .0 05/08/2024     Lab Results   Component Value Date     05/08/2024    K 3.9 05/08/2024     05/08/2024    CO2 29.0 05/08/2024    BUN 13 05/08/2024    CREATSERUM 1.16 05/08/2024     (H) 05/08/2024    CA 9.7 05/08/2024     Lab Results   Component Value Date    INR 1.09 05/08/2024         Airway      Mallampati: II  Mouth opening: 3 FB  TM distance: 4 - 6 cm  Neck ROM: full Cardiovascular    Cardiovascular exam normal.  Rhythm: regular  Rate: normal  (-) murmur   Dental    Dentition appears grossly intact         Pulmonary          (+) rhonchi           Other findings              IMPRESSION:  Abnormal rest/stress gated SPECT myocardial perfusion scan with evidence of infarct in the basal to distal inferior wall that is large and a small area of infarct in the mid to anterior wall.   Normal left ventricular size and low normal systolic function. No obvious regional wall motion abnormalities. Calculated left ventricular ejection  fraction of 50%.    Normal electrocardiographic response to regadenoson infusion with no prognostically significant cardiac symptoms or arrhythmia.    Compared to previous study from 04/21/2021, no significant change in the perfusion defect. The left ventricle size has decreased and slight improvement of the left ventricular systolic function from 44% to 50%.     jerry Godinez MD 05/14/2022 18:51  t   [id number] 5/16/2022 2:51 PM #[id number]    ASA: 3   Plan: spinal  NPO status verified and patient meets guidelines.  Patient has not taken beta blockers in last 24 hours.        Plan/risks discussed with: patient and child/children  Use of blood product(s) discussed with: patient    Consented to blood products.        We discussed risks and benefits of spinal and GA and will opt for spinal w/GA as back up.  We discussed low probability of HA, back ache, failed block, high block.  We discussed PONV prophylaxis and analgesic plan including TJP.  The patient's questions were answered and they agree to proceed.

## 2024-05-28 NOTE — CONSULTS
[place] HOSPITALIST  CONSULT     Jordan Valencia Patient Status:  Inpatient    1945 MRN [id number]   Location [facility] 3SW-A Attending Greg Manuel MD   Hosp Day # 0 PCP Maco Julien DO     Reason for consult: Medical management    Requested by: Dr. Manuel    Subjective:   History of Present Illness:     Jordan Valencia is a 79 year old male with a PMH of BPH, COPD, DMII, DL, and GERD is being seen following left THR. Procedure uncomplicated. Currently denies pain. Denies CP/SOB/N/V.    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 No pertinent family history.  Social History:    reports that he quit smoking about 3 years ago. His smoking use included cigarettes. He started smoking about 3 years ago. He has a 30 pack-year smoking history. He has never used smokeless tobacco. He reports that he does not currently use alcohol. He reports that he does not currently use drugs.     Allergies:   Allergies   Allergen Reactions    Gabapentin NAUSEA AND VOMITING    Gemfibrozil NAUSEA AND VOMITING    Bee Venom ITCHING    Lisinopril  Coughing       Medications:    No current facility-administered medications on file prior to encounter.     Current Outpatient Medications on File Prior to Encounter   Medication Sig Dispense Refill    HYDROcodone-acetaminophen 5-325 MG Oral Tab Take 1 tablet by mouth every 6 (six) hours as needed for Pain.      glipiZIDE 10 MG Oral Tab Take 1 tablet (10 mg total) by mouth 2 (two) times daily before meals.      BREO ELLIPTA 200-25 MCG/ACT Inhalation Aerosol Powder, Breath Activated INHALE 1 PUFF INTO THE LUNGS DAILY 60 each 5    Ferrous Sulfate 325 (65 Fe) MG Oral Tab Take 1 tablet (325 mg total) by mouth daily with breakfast. Every other day      Alogliptin Benzoate 25 MG Oral Tab 25 mg.      Empagliflozin 25 MG Oral Tab Take 1 tablet by mouth daily.      ergocalciferol 1.25 MG (21341 UT) Oral Cap       ALBUTEROL (2.5 MG/3ML) 0.083% Inhalation Nebu Soln INHALE 3 ML BY NEBULIZATION EVERY 4 HOURS AS NEEDED FOR WHEEZING 75 mL 3    ipratropium-albuterol (COMBIVENT RESPIMAT)  MCG/ACT Inhalation Aero Soln Inhale 1 spray into the lungs 2 (two) times daily.      omeprazole 20 MG Oral Capsule Delayed Release Take 2 capsules (40 mg total) by mouth daily.      aspirin 81 MG Oral Tab EC Take 1 tablet (81 mg total) by mouth daily.      finasteride 5 MG Oral Tab Take 1 tablet (5 mg total) by mouth daily.      metoprolol succinate 25 MG Oral Tablet 24 Hr Take 0.5 tablets (12.5 mg total) by mouth daily.      cholecalciferol 25 MCG (1000 UT) Oral Tab Take 1 tablet (1,000 Units total) by mouth daily.      clonazePAM 0.5 MG Oral Tab Take 1 tablet (0.5 mg total) by mouth nightly.      atorvastatin 40 MG Oral Tab Take 1.5 tablets (60 mg total) by mouth daily.      Omega-3 1000 MG Oral Cap Take 1 capsule by mouth daily.      tamsulosin (FLOMAX) cap Take 1 capsule (0.4 mg total) by mouth every evening.      Melatonin 10 MG Oral Tab Take 2 tablets by mouth nightly.      albuterol 108 (90 Base) MCG/ACT Inhalation Aero Soln Inhale 2  puffs into the lungs every 4 (four) hours as needed. 2 each 2    Lancets (ONETOUCH DELICA PLUS [id number]) Does not apply Misc 1 lancet  by Finger stick route 2 (two) times daily. Dx E11.9 non insulin dependent 200 each 1    Blood Glucose Monitoring Suppl (ONETOUCH VERIO REFLECT) w/Device Does not apply Kit 1 lancet by Finger stick route 2 (two) times daily. Please send what is covered under patient formulary. DX  E11.9 DM2 controlled w/o insulin 1 kit 0    [DISCONTINUED] rOPINIRole HCl 5 MG Oral Tab Take 0.5 tablets (2.5 mg total) by mouth in the morning and 0.5 tablets (2.5 mg total) before bedtime. 2.5mg 8pm and 2 mg 2pm .         Review of Systems:   A comprehensive review of systems was completed.    Pertinent positives and negatives noted in the HPI.    Objective:   Physical Exam:    /72 (BP Location: Left arm)   Pulse 53   Temp 97.4 °F (36.3 °C) (Oral)   Resp 15   Ht 180.3 cm (5' 11\")   Wt 186 lb (84.4 kg)   SpO2 96%   BMI 25.94 kg/m²   General: No acute distress, Alert  Respiratory: No rhonchi, no wheezes  Cardiovascular: S1, S2. Bradycardic.   Abdomen: Soft, NT/ND, +BS  Neuro: No new focal deficits  Extremities: Left hip incision dressed.     Results:    Labs:      Labs Last 24 Hours:  No results for input(s): \"WBC\", \"HGB\", \"MCV\", \"PLT\", \"BAND\", \"INR\" in the last 168 hours.    Invalid input(s): \"LYM#\", \"MONO#\", \"BASOS#\", \"EOSIN#\"    No results for input(s): \"GLU\", \"BUN\", \"CREATSERUM\", \"GFRAA\", \"GFRNAA\", \"CA\", \"ALB\", \"NA\", \"K\", \"CL\", \"CO2\", \"ALKPHO\", \"AST\", \"ALT\", \"BILT\", \"TP\" in the last 168 hours.    No results for input(s): \"PTP\", \"INR\" in the last 168 hours.    No results for input(s): \"TROP\", \"CK\" in the last 168 hours.      Imaging: Imaging data reviewed in Epic.    Assessment & Plan:      #Left hip OA s/p THR 5/28  -Management per ortho  -PT/OT  -Pain control    #COPD  -Continue home inhalers    #DMII  -SSI    #GERD  -PPI    #BPH  -Finasteride/flomax     #DL  -Statin    #Parkinson's  disease  -Requip     Plan of care discussed with patient.    Bayron Hahn DO  5/28/2024    The 21st Century Cures Act makes medical notes like these available to patients in the interest of transparency. Please be advised this is a medical document. Medical documents are intended to carry relevant information, facts as evident, and the clinical opinion of the practitioner. The medical note is intended as peer to peer communication and may appear blunt or direct. It is written in medical language and may contain abbreviations or verbiage that are unfamiliar.

## 2024-05-28 NOTE — OPERATIVE REPORT
Operative Note    Patient Name/: Jordan Valencia 1945  Date: 2024  Location: [facility]  Preoperative Diagnosis: Left hip osteoarthritis  Postoperative Diagnosis: Same  Operation: Posterior hybrid left total hip arthroplasty  Primary Surgeon: Greg Manuel  Assistant:  YVONNE Celestin first assist.  Rosas GREENE also assisted in this case. Please note a skilled surgical assistant was necessary for this case in order to assist with patient positioning, prepping, draping, incision, exposure, implant placement, wound closure.  Indications: 79-year-old male with end-stage left hip osteoarthritis failed nonsurgical treatment  Surgical Findings: End-stage left hip OA  Operative Report:  After informed consent, the left lower extremity was marked.  Patient was brought to the operating room where spinal anesthesia was induced.  Patient was placed in lateral decubitus position with a peg board, with all bony prominences padded.  The left lower extremity was prepped and draped in sterile fashion.  Timeout was performed to verify correct surgical site and procedure.  2 g of Ancef was given within 1 hour of incision.  Additionally, 1 g tranexamic acid was given intravenously.  Next the limb lengths were measured, 1-1/2 fingerbreadths short at the heel and equal at the tibial tubercle.  A posterior approach incision was performed, through skin and subcutaneous tissue down to the fascia and IT band.  Superficial bleeders were coagulated.  Incision was performed through the IT band distally, in line with the center of the femur, and carried proximally over the posterior superior aspect of the greater trochanter in line with the fibers of the gluteus alexey.  Gluteus alexey was split using Bovie and digital separation.  A deep Charnley retractor was placed.  The limb was then internally rotated, the gluteus medius was identified, and a blunt cobra retractor was placed underneath.  A Lawson was used to  identify the gluteus minimus next to the piriformis, which were  followed by placement of the blunt cobra underneath the gluteus minimus.  Periarticular pain cocktail was injected into the capsule, piriformis and short external rotators.  A full-thickness capsulotomy was performed, through capsule and along the superior aspect of the piriformis up to its insertion on the greater trochanter.  This was carried down along the posterior aspect of the femur.  The piriformis and posterior capsule were tagged for later repair.  Next the hip was dislocated, and the neck cut was made at 16 mm according to the preoperative template.  Lesser to center distance was 63 mm.  The neck cut was performed and the head was removed.  Head measured 53 mm.  Anterior and posterior acetabular retractors were replaced, and the acetabulum was exposed.  Remaining labrum was removed from around the rim, as well as pulvinar from the base of the acetabulum.  I began reaming, starting with a 49 reamer to medialize followed by reaming for position, and reamed by 2 up to a 57 when I had good circumferential fit with bleeding bone anterior, posterior and superior.  A size 58 cup was then placed, using anatomic landmarks as well as the A-frame alignment guide aiming for 40 degrees abduction and 20 degrees anteversion.  The cup was impacted down to the bone, and was seated with good coverage both anterior and posterior.  2 screws were placed in the posterior superior quadrant.  A 40 mm inner diameter liner trial was impacted into place.  Osteophytes were removed anteriorly and posteriorly.  Next, the hip was flexed and internally rotated to expose the proximal femur.  Soft tissue was removed from the medial aspect of the greater trochanter.  A box cutting osteotome was used to remove remaining lateral femoral neck.  Charnley awl was used to localize the femoral canal, followed by lateralizing reamer.  I then began broaching for a Red e App  stem.  I broached up to a size 15 which gave a good fit and fill.  There was a change in pitch with impacting the broach, as well as no further sinking of the broach.  It had good axial control as well.  We trialed with a high offset neck and +3.5 head.  Following reduction of the hip, leg lengths felt to be appropriate, with appropriate restoration of length based on our preoperative template and preoperative leg length measurement.  Tibial tubercle measurement was about 6 to 7 mm long, and lesser to center distance 67 mm.  The hip was stable in full extension with 90 degrees external rotation of the foot, 45 degrees of hip flexion with 20 degrees adduction and 50 degrees internal rotation, and at 90 degrees hip flexion the hip could be internally rotated 40 degrees prior to any subluxation or impingement.  The hip was dislocated, head and neck removed, and broach handle reattached.  The stem was stable.  A dual mobility liner was ultimately impacted due to patient's history of spine disease and Parkinson's.  The size 15 high offset stem was opened, and the femoral canal was prepared for cementing.  A cement restrictor was placed approximately 1cm distal to the measured position of the stem tip.  The canal was irrigated and dried, followed by placement of epinephrine solution-soaked packing gauze.  Cement was mixed and when it reached the appropriate doughy phase, packing was removed and the cement was retrograde injected down the canal with a cement gun, followed by finger pressurization.  The stem was inserted in the appropriate anteversion related to the trial.  The stem was held in place until the cement hardened.  Leg lengths and stability exam were retested with the +3.528 mm / 46 mm dual mobility head, and confirmed same exam as with trials, but now with additional stability at 90 degrees of flexion.  The hip was dislocated, trial head was removed, trunnion was washed and dried, and the final head was  impacted into place.  The cup was washed and suctioned, and the hip was reduced.  Betadine lavage was performed.  The posterior capsule and piriformis were repaired using nonabsorbable suture through drill holes in the posterior greater trochanter.  Closure was performed with 2 Ethibond for fascia, 0 Vicryl for deep fat, 2-0 Vicryl for skin, followed by a running 4-0 Monocryl.  Dermabond and Steri-Strips were applied, and Aquacel dressing was placed.  A hip abductor pillow was placed, and the patient was brought to PACU in good condition.  Anesthesia: Spinal  Complications: None  Specimens: Femoral head  Blood loss: 50 mL  Fluids: See anesthesia report  Implants: [employer] 15 high offset cemented stem, 20 mm +3.5 ceramic head, 46 mm dual mobility ball, 58 mm G7 shell  Drains: None  Condition: Good  Disposition: Floor    -Weightbearing as tolerated, posterior hip precautions, PT OT  -DVT prophylaxis mechanical plus aspirin twice daily  -Pain control with oral meds  -Perioperative Ancef, 1 week Keflex postop due to diabetes, tobacco history, chronic kidney disease  -Stress dose of hydrocortisone 100 mg IV push every 8 hours x 3 doses per pulmonary recs  -Hypoalbuminemia - protein shake supplementation    Greg Manuel MD, FAAOS  [facility]  Phone [phone]  Fax [phone]

## 2024-05-28 NOTE — INTERVAL H&P NOTE
Pre-op Diagnosis: Primary osteoarthritis of left hip [M16.12]    The above referenced H&P was reviewed by Greg Manuel MD on 5/28/2024, the patient was examined and no significant changes have occurred in the patient's condition since the H&P was performed.  I discussed with the patient and/or legal representative the potential benefits, risks and side effects of this procedure; the likelihood of the patient achieving goals; and potential problems that might occur during recuperation.  I discussed reasonable alternatives to the procedure, including risks, benefits and side effects related to the alternatives and risks related to not receiving this procedure.  We will proceed with procedure as planned.

## 2024-05-28 NOTE — PROGRESS NOTES
NURSING ADMISSION NOTE      Patient admitted via Cart from PACU  Oriented to room.  Safety precautions initiated.  Bed in low position.  Call light in reach.    A&Ox4, VSS on room air. Dressing to L hip C/D/I, gel ice wrap applied, compression sleeve on. Pain controlled. PT/OT. Daughter at bedside, discussed POC.

## 2024-05-29 ENCOUNTER — APPOINTMENT (OUTPATIENT)
Dept: GENERAL RADIOLOGY | Facility: HOSPITAL | Age: 79
DRG: 470 | End: 2024-05-29
Attending: HOSPITALIST
Payer: MEDICARE

## 2024-05-29 ENCOUNTER — TELEPHONE (OUTPATIENT)
Dept: FAMILY MEDICINE CLINIC | Facility: CLINIC | Age: 79
End: 2024-05-29

## 2024-05-29 DIAGNOSIS — Z96.642 STATUS POST LEFT HIP REPLACEMENT: Primary | ICD-10-CM

## 2024-05-29 LAB
GLUCOSE BLD-MCNC: 229 MG/DL (ref 70–99)
GLUCOSE BLD-MCNC: 232 MG/DL (ref 70–99)
GLUCOSE BLD-MCNC: 316 MG/DL (ref 70–99)
GLUCOSE BLD-MCNC: 371 MG/DL (ref 70–99)

## 2024-05-29 PROCEDURE — 99232 SBSQ HOSP IP/OBS MODERATE 35: CPT | Performed by: HOSPITALIST

## 2024-05-29 PROCEDURE — 71045 X-RAY EXAM CHEST 1 VIEW: CPT | Performed by: HOSPITALIST

## 2024-05-29 PROCEDURE — 99024 POSTOP FOLLOW-UP VISIT: CPT | Performed by: PHYSICIAN ASSISTANT

## 2024-05-29 RX ORDER — OXYCODONE HYDROCHLORIDE 5 MG/1
5 TABLET ORAL EVERY 4 HOURS PRN
Qty: 40 TABLET | Refills: 0 | Status: SHIPPED | OUTPATIENT
Start: 2024-05-29 | End: 2024-05-29

## 2024-05-29 RX ORDER — ASPIRIN 81 MG/1
81 TABLET ORAL 2 TIMES DAILY
Qty: 80 TABLET | Refills: 0 | Status: SHIPPED | OUTPATIENT
Start: 2024-05-29 | End: 2024-07-08

## 2024-05-29 RX ORDER — ACETAMINOPHEN 500 MG
1000 TABLET ORAL EVERY 8 HOURS
Qty: 90 TABLET | Refills: 0 | Status: SHIPPED | OUTPATIENT
Start: 2024-05-29 | End: 2024-05-29

## 2024-05-29 RX ORDER — ACETAMINOPHEN 500 MG
1000 TABLET ORAL EVERY 8 HOURS
Qty: 90 TABLET | Refills: 0 | Status: SHIPPED | OUTPATIENT
Start: 2024-05-29 | End: 2024-06-13

## 2024-05-29 RX ORDER — PSEUDOEPHEDRINE HCL 30 MG
100 TABLET ORAL 2 TIMES DAILY PRN
Qty: 30 CAPSULE | Refills: 0 | Status: SHIPPED | OUTPATIENT
Start: 2024-05-29

## 2024-05-29 RX ORDER — CHLORAL HYDRATE 500 MG
1 CAPSULE ORAL DAILY
Status: SHIPPED | COMMUNITY
Start: 2024-07-10

## 2024-05-29 RX ORDER — CEPHALEXIN 500 MG/1
500 CAPSULE ORAL EVERY 8 HOURS SCHEDULED
Qty: 18 CAPSULE | Refills: 0 | Status: SHIPPED | OUTPATIENT
Start: 2024-05-29 | End: 2024-05-29

## 2024-05-29 RX ORDER — CELECOXIB 200 MG/1
200 CAPSULE ORAL DAILY
Qty: 30 CAPSULE | Refills: 0 | Status: SHIPPED | OUTPATIENT
Start: 2024-05-29 | End: 2024-05-29

## 2024-05-29 RX ORDER — FUROSEMIDE 10 MG/ML
20 INJECTION INTRAMUSCULAR; INTRAVENOUS ONCE
Status: COMPLETED | OUTPATIENT
Start: 2024-05-29 | End: 2024-05-29

## 2024-05-29 RX ORDER — MELATONIN
3 NIGHTLY PRN
Status: DISCONTINUED | OUTPATIENT
Start: 2024-05-29 | End: 2024-05-30

## 2024-05-29 RX ORDER — CEPHALEXIN 500 MG/1
500 CAPSULE ORAL EVERY 8 HOURS SCHEDULED
Qty: 18 CAPSULE | Refills: 0 | Status: SHIPPED | OUTPATIENT
Start: 2024-05-29 | End: 2024-06-04

## 2024-05-29 RX ORDER — PSEUDOEPHEDRINE HCL 30 MG
100 TABLET ORAL 2 TIMES DAILY PRN
Qty: 30 CAPSULE | Refills: 0 | Status: SHIPPED | OUTPATIENT
Start: 2024-05-29 | End: 2024-05-29

## 2024-05-29 RX ORDER — CELECOXIB 200 MG/1
200 CAPSULE ORAL DAILY
Qty: 30 CAPSULE | Refills: 0 | Status: SHIPPED | OUTPATIENT
Start: 2024-05-29 | End: 2024-06-28

## 2024-05-29 RX ORDER — ASPIRIN 81 MG/1
81 TABLET ORAL 2 TIMES DAILY
Qty: 80 TABLET | Refills: 0 | Status: SHIPPED | OUTPATIENT
Start: 2024-05-29 | End: 2024-05-29

## 2024-05-29 RX ORDER — OXYCODONE HYDROCHLORIDE 5 MG/1
5 TABLET ORAL EVERY 4 HOURS PRN
Qty: 40 TABLET | Refills: 0 | Status: SHIPPED | OUTPATIENT
Start: 2024-05-29

## 2024-05-29 RX ORDER — TRAMADOL HYDROCHLORIDE 50 MG/1
50 TABLET ORAL EVERY 12 HOURS SCHEDULED
Qty: 30 TABLET | Refills: 0 | Status: SHIPPED | OUTPATIENT
Start: 2024-05-29 | End: 2024-05-29

## 2024-05-29 RX ORDER — TRAMADOL HYDROCHLORIDE 50 MG/1
50 TABLET ORAL EVERY 12 HOURS SCHEDULED
Qty: 30 TABLET | Refills: 0 | Status: SHIPPED | OUTPATIENT
Start: 2024-05-29 | End: 2024-06-13

## 2024-05-29 NOTE — PLAN OF CARE
Aox4, denying numbness or tingling tonight, pain controlled on ordered pain medication, on 2L O2 , IS, scds in place, Spandigrip in place to LLE, on ASA, voided in urinal, aquacel dressing c/d/I, posterior hip prx, PT/OT to see, no further needs at this time.

## 2024-05-29 NOTE — PHYSICAL THERAPY NOTE
PHYSICAL THERAPY EVALUATION - INPATIENT     Room Number: 352/352-A  Evaluation Date: 5/29/2024  Type of Evaluation: Initial  Physician Order: PT Eval and Treat    Presenting Problem: s/p L EMILY 5/28/24  Co-Morbidities : DM2, CKD, HTH, COPD  Reason for Therapy: Mobility Dysfunction and Discharge Planning    PHYSICAL THERAPY ASSESSMENT   Patient is a 79 year old male admitted 5/28/2024 for elective L EMILY.   Patient is currently functioning near baseline with bed mobility, transfers, gait, and stair negotiation. Prior to admission, patient's baseline is supervision/MOD I     Patient will benefit from continued skilled PT Services at discharge to promote functional independence in home.  Anticipate patient will return home with home health PT.    PLAN  Patient has been evaluated and presents with no skilled Physical Therapy needs at this time.  Patient discharged from Physical Therapy services.  Please re-order if a new functional limitation presents during this admission.    GOALS  Patient was able to achieve the following goals ...    Patient was able to transfer Safely and independently   Patient able to ambulate on level surfaces Safely and independently         HOME SITUATION  Type of Home: House   Home Layout: Two level;Able to live on main level  Stairs to Enter : 2             Lives With: Daughter;Family  Drives: No  Patient Owned Equipment: Rolling walker  Patient Regularly Uses: Home O2 (2L O2 at night only)    Prior Level of [place]: Pt lives with daughter, HECTOR, and SILs brother in 2 story home. Pt able to stay on the main level of the home. Pt independent with ADL and mobility. Pt ambulates with RW. Pt wears nocturnal 02. Pt enjoys fishing.     SUBJECTIVE  \"It does really hurt when I walk.\"       OBJECTIVE  Precautions: EMILY - posterior;Bed/chair alarm  Fall Risk: Standard fall risk    WEIGHT BEARING RESTRICTION  Weight Bearing Restriction: L lower extremity           L Lower Extremity: Weight  Bearing as Tolerated    PAIN ASSESSMENT  Ratin  Location: L hip  Management Techniques: Activity promotion;Repositioning    COGNITION  Overall Cognitive Status:  WFL - within functional limits    RANGE OF MOTION AND STRENGTH ASSESSMENT  Upper extremity ROM and strength are within functional limits     Lower extremity ROM is within functional limits     Lower extremity strength is within functional limits       BALANCE  Static Sitting: Good  Dynamic Sitting: Good  Static Standing: Fair -  Dynamic Standing: Fair -    ADDITIONAL TESTS                                    ACTIVITY TOLERANCE                         O2 WALK   02 desat to 85% on RA with ambulation     NEUROLOGICAL FINDINGS                        AM-PAC '6-Clicks' INPATIENT SHORT FORM - BASIC MOBILITY  How much difficulty does the patient currently have...  Patient Difficulty: Turning over in bed (including adjusting bedclothes, sheets and blankets)?: A Little   Patient Difficulty: Sitting down on and standing up from a chair with arms (e.g., wheelchair, bedside commode, etc.): A Little   Patient Difficulty: Moving from lying on back to sitting on the side of the bed?: A Little   How much help from another person does the patient currently need...   Help from Another: Moving to and from a bed to a chair (including a wheelchair)?: A Little   Help from Another: Need to walk in hospital room?: A Little   Help from Another: Climbing 3-5 steps with a railing?: A Little       AM-PAC Score:  Raw Score: 18   Approx Degree of Impairment: 46.58%   Standardized Score (AM-PAC Scale): 43.63   CMS Modifier (G-Code): CK    FUNCTIONAL ABILITY STATUS  Gait Assessment   Functional Mobility/Gait Assessment  Gait Assistance: Supervision  Distance (ft): 150  Assistive Device: Rolling walker  Stairs: Stairs  How Many Stairs: 4  Device: 1 Rail  Assist: Supervision  Pattern: Ascend and Descend  Ascend and Descend : Step to    Skilled Therapy Provided   Able to perform bed  mobility with increased time and supervision.   BP stable at EOB.   VC for transfer set up.   Supervision for sit-stand.   Ambulatory with RW and supervision.   Ambulates with step through gait pattern.   VC for stride length and posture.   Ascended/descended 4 stairs with step to pattern.   VC for sequencing.   Pt returned to room.     Bed Mobility:  Rolling: supervision  Supine to sit: supervision   Sit to supine: NT     Transfer Mobility:  Sit to stand: supervision   Stand to sit: supervision  Gait = supervision     Therapist's comments: Reviewed hip precautions and activity recommendations.     Exercise/Education Provided:  Bed mobility  Energy conservation  Functional activity tolerated  Gait training  Posture  Strengthening  Transfer training    Patient End of Session: Up in chair;With  staff;Needs met;Call light within reach;All patient questions and concerns addressed;Alarm set    Patient Evaluation Complexity Level:  History Moderate - 1 or 2 personal factors and/or co-morbidities   Examination of body systems Low - addressing 1-2 elements   Clinical Presentation Low - Stable   Clinical Decision Making Low Complexity       PT Session Time: 30 minutes  Gait Training: 15 minutes  Therapeutic Activity:  8 minutes  Neuromuscular Re-education:  minutes  Therapeutic Exercise:  minutes

## 2024-05-29 NOTE — ANESTHESIA POSTPROCEDURE EVALUATION
[facility]    Jordan Valencia Patient Status:  Inpatient   Age/Gender 79 year old male MRN [id number]   Location [facility] 3SW-A Attending Greg Manuel MD   Hosp Day # 1 PCP Maco Julien DO       Anesthesia Post-op Note    LEFT POSTERIOR CEMENTED TOTAL HIP ARTHROPLASTY    Procedure Summary       Date: 05/28/24 Room / Location:  MAIN OR [address] [place] [address] MAIN OR    Anesthesia Start: 1314 Anesthesia Stop: 1534    Procedure: LEFT POSTERIOR CEMENTED TOTAL HIP ARTHROPLASTY (Left) Diagnosis:       Primary osteoarthritis of left hip      (Primary osteoarthritis of left hip [M16.12])    Surgeons: Greg Manuel MD Responsible Provider: Braden Ayala MD    Anesthesia Type: spinal ASA Status: 3            Anesthesia Type: spinal    Vitals Value Taken Time   /83 05/29/24 0428   Temp 97.8 °F (36.6 °C) 05/29/24 0400   Pulse 59 05/29/24 0540   Resp 18 05/29/24 0400   SpO2 89 % 05/29/24 0540   Vitals shown include unfiled device data.    Patient Location: PACU    Anesthesia Type: spinal    Airway Patency: patent    Postop Pain Control: adequate    Mental Status: preanesthetic baseline    Nausea/Vomiting: none    Cardiopulmonary/Hydration status: stable euvolemic    Complications: no apparent anesthesia related complications    Postop vital signs: stable    Dental Exam: Unchanged from Preop    Patient to be discharged from PACU when criteria met.

## 2024-05-29 NOTE — PHYSICAL THERAPY NOTE
PHYSICAL THERAPY EVALUATION - INPATIENT     Room Number: 352/352-A  Evaluation Date: 5/29/2024  Type of Evaluation: Initial  Physician Order: PT Eval and Treat    Presenting Problem: s/p L EMILY 5/28/24  Co-Morbidities : DM2, CKD, HTH, COPD  Reason for Therapy: Mobility Dysfunction and Discharge Planning    PHYSICAL THERAPY ASSESSMENT   Patient is a 79 year old male admitted 5/28/2024 for elective L EMILY.   Patient is currently functioning near baseline with bed mobility, transfers, gait, and stair negotiation. Prior to admission, patient's baseline is supervision/MOD I     Patient will benefit from continued skilled PT Services at discharge to promote functional independence in home.  Anticipate patient will return home with home health PT.    PLAN  Patient has been evaluated and presents with no skilled Physical Therapy needs at this time.  Patient discharged from Physical Therapy services.  Please re-order if a new functional limitation presents during this admission.    GOALS  Patient was able to achieve the following goals ...    Patient was able to transfer Safely and independently   Patient able to ambulate on level surfaces Safely and independently         HOME SITUATION  Type of Home: House   Home Layout: Two level;Able to live on main level  Stairs to Enter : 2             Lives With: Daughter;Family  Drives: No  Patient Owned Equipment: Rolling walker  Patient Regularly Uses: Home O2 (2L O2 at night only)    Prior Level of [place]: Pt lives with daughter, HECTOR, and SILs brother in 2 story home. Pt able to stay on the main level of the home. Pt independent with ADL and mobility. Pt ambulates with RW. Pt wears nocturnal 02. Pt enjoys fishing.     SUBJECTIVE  \"It does really hurt when I walk.\"       OBJECTIVE  Precautions: EMILY - posterior;Bed/chair alarm  Fall Risk: Standard fall risk    WEIGHT BEARING RESTRICTION  Weight Bearing Restriction: L lower extremity           L Lower Extremity: Weight  Bearing as Tolerated    PAIN ASSESSMENT  Ratin  Location: L hip  Management Techniques: Activity promotion;Repositioning    COGNITION  Overall Cognitive Status:  WFL - within functional limits    RANGE OF MOTION AND STRENGTH ASSESSMENT  Upper extremity ROM and strength are within functional limits     Lower extremity ROM is within functional limits     Lower extremity strength is within functional limits       BALANCE  Static Sitting: Good  Dynamic Sitting: Good  Static Standing: Fair -  Dynamic Standing: Fair -    ADDITIONAL TESTS                                    ACTIVITY TOLERANCE                         O2 WALK   02 desat to 85% on RA with ambulation     NEUROLOGICAL FINDINGS                        AM-PAC '6-Clicks' INPATIENT SHORT FORM - BASIC MOBILITY  How much difficulty does the patient currently have...  Patient Difficulty: Turning over in bed (including adjusting bedclothes, sheets and blankets)?: A Little   Patient Difficulty: Sitting down on and standing up from a chair with arms (e.g., wheelchair, bedside commode, etc.): A Little   Patient Difficulty: Moving from lying on back to sitting on the side of the bed?: A Little   How much help from another person does the patient currently need...   Help from Another: Moving to and from a bed to a chair (including a wheelchair)?: A Little   Help from Another: Need to walk in hospital room?: A Little   Help from Another: Climbing 3-5 steps with a railing?: A Little       AM-PAC Score:  Raw Score: 18   Approx Degree of Impairment: 46.58%   Standardized Score (AM-PAC Scale): 43.63   CMS Modifier (G-Code): CK    FUNCTIONAL ABILITY STATUS  Gait Assessment   Functional Mobility/Gait Assessment  Gait Assistance: Supervision  Distance (ft): 150  Assistive Device: Rolling walker  Stairs: Stairs  How Many Stairs: 4  Device: 1 Rail  Assist: Supervision  Pattern: Ascend and Descend  Ascend and Descend : Step to    Skilled Therapy Provided   Able to perform bed  mobility with increased time and supervision.   BP stable at EOB.   VC for transfer set up.   Supervision for sit-stand.   Ambulatory with RW and supervision.   Ambulates with step through gait pattern.   VC for stride length and posture.   Ascended/descended 4 stairs with step to pattern.   VC for sequencing.   Pt returned to room.     Bed Mobility:  Rolling: supervision  Supine to sit: supervision   Sit to supine: NT     Transfer Mobility:  Sit to stand: supervision   Stand to sit: supervision  Gait = supervision     Therapist's comments: Reviewed hip precautions and activity recommendations.     Exercise/Education Provided:  Bed mobility  Energy conservation  Functional activity tolerated  Gait training  Posture  Strengthening  Transfer training    Patient End of Session: Up in chair;With  staff;Needs met;Call light within reach;All patient questions and concerns addressed;Alarm set    Patient Evaluation Complexity Level:  History Moderate - 1 or 2 personal factors and/or co-morbidities   Examination of body systems Low - addressing 1-2 elements   Clinical Presentation Low - Stable   Clinical Decision Making Low Complexity       PT Session Time: 30 minutes  Gait Training: 15 minutes  Therapeutic Activity:  8 minutes  Neuromuscular Re-education:  minutes  Therapeutic Exercise:  minutes

## 2024-05-29 NOTE — PLAN OF CARE
POD#1, dressing to L hip C/D/I, gel ice pack applied, compression sleeve on. Abductor pillow in place. Pain well controlled with current regimen. Ambulating with assistance, up in chair for all meals.   O2 level kept >90% on 2-3L O2 NC. Chest xray completed, IV lasix given x1. Plan to monitor overnight and wean O2 as possible. POC discussed with patient.

## 2024-05-29 NOTE — PROGRESS NOTES
[facility]   part of [facility]     Hospitalist Progress Note     Jordan Nelson Valencia Patient Status:  Inpatient    1945 MRN [id number]   Location [facility] 3SW-A Attending Greg Manuel MD   Hosp Day # 1 PCP Maco Julien DO     Chief Complaint: Medical management    Subjective:     Patient seen and examined. Denies pain or leg numbness.     Objective:    Review of Systems:   A comprehensive review of systems was completed; pertinent positive and negatives stated in subjective.    Vital signs:  Temp:  [97.1 °F (36.2 °C)-98.1 °F (36.7 °C)] 97.8 °F (36.6 °C)  Pulse:  [53-94] 94  Resp:  [12-25] 18  BP: ()/(56-83) 119/63  SpO2:  [91 %-100 %] 91 %    Physical Exam:    General: No acute distress  Respiratory: Diminished bilaterally.   Cardiovascular: S1, S2, regular rate and rhythm  Abdomen: Soft, Non-tender, non-distended, positive bowel sounds  Neuro: No new focal deficits.   Extremities: No edema    Diagnostic Data:    Labs:  No results for input(s): \"WBC\", \"HGB\", \"MCV\", \"PLT\", \"BAND\", \"INR\" in the last 168 hours.    Invalid input(s): \"LYM#\", \"MONO#\", \"BASOS#\", \"EOSIN#\"    No results for input(s): \"GLU\", \"BUN\", \"CREATSERUM\", \"GFRAA\", \"GFRNAA\", \"CA\", \"ALB\", \"NA\", \"K\", \"CL\", \"CO2\", \"ALKPHO\", \"AST\", \"ALT\", \"BILT\", \"TP\" in the last 168 hours.    CrCl cannot be calculated (Patient's most recent lab result is older than the maximum 7 days allowed.).    No results for input(s): \"TROP\", \"TROPHS\", \"CK\" in the last 168 hours.    No results for input(s): \"PTP\", \"INR\" in the last 168 hours.               Microbiology    No results found for this visit on 24.      Imaging: Reviewed in Epic.    Medications:    tamsulosin  0.4 mg Oral QPM    rOPINIRole  2 mg Oral BID    pantoprazole  20 mg Oral QAM AC    finasteride  5 mg Oral Daily    clonazePAM  0.5 mg Oral Nightly    fluticasone furoate-vilanterol  1 puff Inhalation Daily    atorvastatin  60 mg Oral Daily    umeclidinium bromide  1  puff Inhalation Daily    albuterol  1 puff Inhalation BID    acetaminophen  650 mg Oral Q4H While awake    ketorolac  15 mg Intravenous Q6H    sennosides  17.2 mg Oral Nightly    docusate sodium  100 mg Oral BID    ferrous sulfate  325 mg Oral Daily with breakfast    aspirin  81 mg Oral BID    traMADol  50 mg Oral 2 times per day    cephalexin  500 mg Oral Q8H PATRICIO    insulin aspart  1-10 Units Subcutaneous TID AC and HS       Assessment & Plan:      #Left hip OA s/p THR 5/28  -Management per ortho  -PT/OT  -Pain control    #Hypoxia  -Possibly mild volume overload on CXR  -IV lasix x 1  -Typically on O2 nocturnally      #COPD  -Continue home inhalers     #DMII  -SSI     #GERD  -PPI     #BPH  -Finasteride/flomax      #DL  -Statin     #Parkinson's disease  -Requip     Bayron Hahn,     Supplementary Documentation:     Quality:  DVT Mechanical Prophylaxis:   SCDs, Early ambuation  DVT Pharmacologic Prophylaxis   Medication   None         DVT Pharmacologic prophylaxis: Aspirin 81 mg      Code Status: Not on file  Franco: No urinary catheter in place  Franco Duration (in days):   Central line:    VIVIANA: 5/30/2024    Discharge is dependent on: clinical progress  At this point Mr. Valencia is expected to be discharge to: home    The 21st Century Cures Act makes medical notes like these available to patients in the interest of transparency. Please be advised this is a medical document. Medical documents are intended to carry relevant information, facts as evident, and the clinical opinion of the practitioner. The medical note is intended as peer to peer communication and may appear blunt or direct. It is written in medical language and may contain abbreviations or verbiage that are unfamiliar.             **Certification      PHYSICIAN Certification of Need for Inpatient Hospitalization - Initial Certification    Patient will require inpatient services that will reasonably be expected to span two midnight's based on the  clinical documentation in H+P.   Based on patients current state of illness, I anticipate that, after discharge, patient will require TBD.

## 2024-05-29 NOTE — PHYSICAL THERAPY NOTE
PHYSICAL THERAPY EVALUATION - INPATIENT     Room Number: 352/352-A  Evaluation Date: 5/29/2024  Type of Evaluation: Initial  Physician Order: PT Eval and Treat    Presenting Problem: s/p L EMILY 5/28/24  Co-Morbidities : DM2, CKD, HTH, COPD  Reason for Therapy: Mobility Dysfunction and Discharge Planning    PHYSICAL THERAPY ASSESSMENT   Patient is a 79 year old male admitted 5/28/2024 for elective L EMILY.   Patient is currently functioning near baseline with bed mobility, transfers, gait, and stair negotiation. Prior to admission, patient's baseline is supervision/MOD I     Patient will benefit from continued skilled PT Services at discharge to promote functional independence in home.  Anticipate patient will return home with home health PT.    PLAN  Patient has been evaluated and presents with no skilled Physical Therapy needs at this time.  Patient discharged from Physical Therapy services.  Please re-order if a new functional limitation presents during this admission.    GOALS  Patient was able to achieve the following goals ...    Patient was able to transfer Safely and independently   Patient able to ambulate on level surfaces Safely and independently         HOME SITUATION  Type of Home: House   Home Layout: Two level;Able to live on main level  Stairs to Enter : 2             Lives With: Daughter;Family  Drives: No  Patient Owned Equipment: Rolling walker  Patient Regularly Uses: Home O2 (2L O2 at night only)    Prior Level of [place]: Pt lives with daughter, HECTOR, and SILs brother in 2 story home. Pt able to stay on the main level of the home. Pt independent with ADL and mobility. Pt ambulates with RW. Pt wears nocturnal 02. Pt enjoys fishing.     SUBJECTIVE  \"It does really hurt when I walk.\"       OBJECTIVE  Precautions: EMILY - posterior;Bed/chair alarm  Fall Risk: Standard fall risk    WEIGHT BEARING RESTRICTION  Weight Bearing Restriction: L lower extremity           L Lower Extremity: Weight  Bearing as Tolerated    PAIN ASSESSMENT  Ratin  Location: L hip  Management Techniques: Activity promotion;Repositioning    COGNITION  Overall Cognitive Status:  WFL - within functional limits    RANGE OF MOTION AND STRENGTH ASSESSMENT  Upper extremity ROM and strength are within functional limits     Lower extremity ROM is within functional limits     Lower extremity strength is within functional limits       BALANCE  Static Sitting: Good  Dynamic Sitting: Good  Static Standing: Fair -  Dynamic Standing: Fair -    ADDITIONAL TESTS                                    ACTIVITY TOLERANCE                         O2 WALK   02 desat to 85% on RA with ambulation     NEUROLOGICAL FINDINGS                        AM-PAC '6-Clicks' INPATIENT SHORT FORM - BASIC MOBILITY  How much difficulty does the patient currently have...  Patient Difficulty: Turning over in bed (including adjusting bedclothes, sheets and blankets)?: A Little   Patient Difficulty: Sitting down on and standing up from a chair with arms (e.g., wheelchair, bedside commode, etc.): A Little   Patient Difficulty: Moving from lying on back to sitting on the side of the bed?: A Little   How much help from another person does the patient currently need...   Help from Another: Moving to and from a bed to a chair (including a wheelchair)?: A Little   Help from Another: Need to walk in hospital room?: A Little   Help from Another: Climbing 3-5 steps with a railing?: A Little       AM-PAC Score:  Raw Score: 18   Approx Degree of Impairment: 46.58%   Standardized Score (AM-PAC Scale): 43.63   CMS Modifier (G-Code): CK    FUNCTIONAL ABILITY STATUS  Gait Assessment   Functional Mobility/Gait Assessment  Gait Assistance: Supervision  Distance (ft): 150  Assistive Device: Rolling walker  Stairs: Stairs  How Many Stairs: 4  Device: 1 Rail  Assist: Supervision  Pattern: Ascend and Descend  Ascend and Descend : Step to    Skilled Therapy Provided   Able to perform bed  mobility with increased time and supervision.   BP stable at EOB.   VC for transfer set up.   Supervision for sit-stand.   Ambulatory with RW and supervision.   Ambulates with step through gait pattern.   VC for stride length and posture.   Ascended/descended 4 stairs with step to pattern.   VC for sequencing.   Pt returned to room.     Bed Mobility:  Rolling: supervision  Supine to sit: supervision   Sit to supine: NT     Transfer Mobility:  Sit to stand: supervision   Stand to sit: supervision  Gait = supervision     Therapist's comments: Reviewed hip precautions and activity recommendations.     Exercise/Education Provided:  Bed mobility  Energy conservation  Functional activity tolerated  Gait training  Posture  Strengthening  Transfer training    Patient End of Session: Up in chair;With  staff;Needs met;Call light within reach;All patient questions and concerns addressed;Alarm set    Patient Evaluation Complexity Level:  History Moderate - 1 or 2 personal factors and/or co-morbidities   Examination of body systems Low - addressing 1-2 elements   Clinical Presentation Low - Stable   Clinical Decision Making Low Complexity       PT Session Time: 30 minutes  Gait Training: 15 minutes  Therapeutic Activity:  8 minutes  Neuromuscular Re-education:  minutes  Therapeutic Exercise:  minutes

## 2024-05-29 NOTE — PROGRESS NOTES
[facility] Ortho Progress Note    Subjective: Doing well from a hip standpoint. Pain well controlled on oral meds. Tolerating diet, voiding independently. Has been up with and cleared PT/OT.     Objective: Seated comfortably in hospital chair. Alert and oriented x3. Dressing C/D/I. Firing BL TA/EHL.     Assessment/Plan: 79 year old male postop day #1 status post left posterior total hip arthroplasty.    -Weightbearing as tolerated, posterior hip precautions, PT OT  -DVT prophylaxis mechanical plus aspirin twice daily  -Pain control with oral meds  -Perioperative Ancef, 1 week Keflex postop due to diabetes, tobacco history, chronic kidney disease  -Stress dose of hydrocortisone 100 mg IV push every 8 hours x 3 doses per pulmonary recs, should be completed by this afternoon  -Hypoalbuminemia - protein shake supplementation  Disposition: Home pending hospitalist clearance, awaiting chest XR    Shun Dominguez PA-C  [facility]  Phone [phone]  Fax [phone]

## 2024-05-29 NOTE — OCCUPATIONAL THERAPY NOTE
OCCUPATIONAL THERAPY EVALUATION - INPATIENT    Room Number: 352/352-A  Evaluation Date: 5/29/2024     Type of Evaluation: Initial  Presenting Problem: s/p L post EMILY on 5/28    Physician Order: IP Consult to Occupational Therapy  Reason for Therapy:  ADL/IADL Dysfunction and Discharge Planning      OCCUPATIONAL THERAPY ASSESSMENT   Patient is a 79 year old male admitted on 5/28/2024 with Presenting Problem: s/p L post EMILY on 5/28. Co-Morbidities : DM2, CKD, HTH, COPD  Patient is currently functioning near baseline with toileting, bathing, upper body dressing, lower body dressing, bed mobility, and transfers.  Prior to admission, patient's baseline is mod I with a RW.  Patient met all OT goals at supervision(except LB dressing with mod A, pt's daughter will assist, they also have a reacher) level.  Patient reports no further questions/concerns at this time.     Patient will benefit from continued skilled OT Services at discharge to promote functional independence in home.  Anticipate patient will return home with home health OT      WEIGHT BEARING RESTRICTION  Weight Bearing Restriction: L lower extremity           L Lower Extremity: Weight Bearing as Tolerated    Recommendations for nursing staff:   Transfers: supervision with RW  Toileting location: Toilet    EVALUATION SESSION:  Patient at start of session: Pt was found sitting in his chair.   FUNCTIONAL TRANSFER ASSESSMENT  Sit to Stand: Edge of Bed; Chair  Edge of Bed: Supervision  Chair: Supervision  Toilet Transfer: Supervision    BED MOBILITY  Supine to Sit : Supervision  Sit to Supine (OT): Supervision    BALANCE ASSESSMENT     FUNCTIONAL ADL ASSESSMENT  UB Dressing Seated: Supervision  LB Dressing Seated: Moderate Assist  LB Dressing Standing: Supervision  Toileting Seated: Supervision  Toileting Standing: Supervision      ACTIVITY TOLERANCE:                         O2 SATURATIONS  Room air with with activity 81%  Placed back on 3L O2>1 min to recover to  92%        COGNITION  STM deficits       COGNITION ASSESSMENTS       Upper Extremity:   ROM: within functional limits   Strength: is within functional limits       EDUCATION PROVIDED  Patient: Role of Occupational Therapy; Functional Transfer Techniques; Fall Prevention; Weight Bear Status; Surgical Precautions; Compensatory ADL Techniques  Patient's Response to Education: Verbalized Understanding  Family/Caregiver: Role of Occupational Therapy; Plan of Care; Discharge Recommendations; Functional Transfer Techniques; Fall Prevention; Weight Bear Status; Surgical Precautions; Compensatory ADL Techniques  Family/Caregiver's Response to Education: Verbalized Understanding    Equipment used: RW, gait belt   Demonstrates functional use    Therapist comments: sitting in chair>UB/LB dressing>stand to RW>walk to bathroom>toileting>stand to RW>walk to sit EOB>supine>sit EOB>stand to RW>walk to sit in chair     Patient End of Session: Up in chair;Needs met;Call light within reach;RN aware of session/findings;All patient questions and concerns addressed;SCDs in place;Ice applied;Alarm set;Family present    OCCUPATIONAL PROFILE    HOME SITUATION  Type of Home: House  Home Layout: Two level;Able to live on main level  Lives With: Daughter;Family    Toilet and Equipment: Comfort height toilet;Other (Comment) (safety frame)  Shower/Tub and Equipment: Walk-in shower;Shower chair  Other Equipment: Sock aid;Other (Comment) (RW)    Occupation/Status: Retired     Drives: No  Patient Regularly Uses: Home O2 (2L O2 at night only)    Prior Level of Function: Pt lives with his daughter (an RN) and HECTOR. Pt uses a RW at baseline. Pt is typically mod I with BADL. Pt has had 2 falls in the past 6 months per his daughter.     SUBJECTIVE  \"He used to put the head of his bed up, but we had an incident so I took the remote away.\" Per pt's daughter     PAIN ASSESSMENT  Ratin  Location: L hip  Management Techniques:  Ice;Relaxation;Repositioning    OBJECTIVE  Precautions: EMILY - posterior;Bed/chair alarm  Fall Risk: Standard fall risk    WEIGHT BEARING RESTRICTION  Weight Bearing Restriction: L lower extremity           L Lower Extremity: Weight Bearing as Tolerated    AM-PAC ‘6-Clicks’ Inpatient Daily Activity Short Form  -   Putting on and taking off regular lower body clothing?: A Lot  -   Bathing (including washing, rinsing, drying)?: A Little  -   Toileting, which includes using toilet, bedpan or urinal? : A Little  -   Putting on and taking off regular upper body clothing?: None  -   Taking care of personal grooming such as brushing teeth?: None  -   Eating meals?: None    AM-PAC Score:  Score: 20  Approx Degree of Impairment: 38.32%  Standardized Score (AM-PAC Scale): 42.03      ADDITIONAL TESTS     NEUROLOGICAL FINDINGS        PLAN   Patient has been evaluated and presents with no skilled Occupational Therapy needs at this time.  Patient discharged from Occupational Therapy services.  Please re-order if a new functional limitation presents during this admission.      Patient Evaluation Complexity Level:   Occupational Profile/Medical History LOW - Brief history including review of medical or therapy records    Specific performance deficits impacting engagement in ADL/IADL LOW  1 - 3 performance deficits    Client Assessment/Performance Deficits LOW - No comorbidities nor modifications of tasks    Clinical Decision Making LOW - Analysis of occupational profile, problem-focused assessments, limited treatment options    Overall Complexity LOW     OT Session Time: 30 minutes  Self-Care Home Management: 15 minutes

## 2024-05-29 NOTE — CM/SW NOTE
05/29/24 1000   CM/SW Referral Data   Referral Source Social Work (self-referral)   Reason for Referral Discharge planning   Informant EMR;Clinical Staff Member   Discharge Needs   Anticipated D/C needs Home health care       HOME SITUATION per OT eval  Type of Home: House  Home Layout: Two level;Able to live on main level  Lives With: Daughter;Family     Prior Level of Function per OT eval: Pt lives with his daughter (an RN) and HECTOR. Pt uses a RW at baseline. Pt is typically mod I with BADL. Pt has had 2 falls in the past 6 months per his daughter.        Patient is a 80 y/o man admitted s/p EMILY.  Pt with pre-operative plan for [facility].  PT recommending HH at DC.  Referral sent to [facility] via [facility] and confirmation received that pt can be accepted.  Met with pt/dtr at bedside who stated that pt is current with [facility] and they prefer to continue services with this provider.  Noted pt also on O2.  Pt confirmed he has home portable O2 concentrator from [employer].  He normally uses 2.5L at night only.  Discussed anticipated DC later today.  If pt still requiring continuous O2 at DC, pt's family will need to bring POC for discharge.    Contacted [facility] and spoke with Yun who confirmed pt current with them for HHC RN/PT.  Referral with updates/order sent to them via [facility].  Updated [facility].  No further DC needs/concerns identified at this time.  SW available should additional discharge needs arise.    Pauline Conway, [facility]  Discharge Planner  [phone]

## 2024-05-30 VITALS
DIASTOLIC BLOOD PRESSURE: 46 MMHG | TEMPERATURE: 98 F | WEIGHT: 186 LBS | HEIGHT: 71 IN | HEART RATE: 61 BPM | OXYGEN SATURATION: 92 % | RESPIRATION RATE: 16 BRPM | SYSTOLIC BLOOD PRESSURE: 117 MMHG | BODY MASS INDEX: 26.04 KG/M2

## 2024-05-30 LAB
GLUCOSE BLD-MCNC: 187 MG/DL (ref 70–99)
GLUCOSE BLD-MCNC: 209 MG/DL (ref 70–99)

## 2024-05-30 PROCEDURE — 99232 SBSQ HOSP IP/OBS MODERATE 35: CPT | Performed by: HOSPITALIST

## 2024-05-30 RX ORDER — FUROSEMIDE 10 MG/ML
20 INJECTION INTRAMUSCULAR; INTRAVENOUS ONCE
Status: COMPLETED | OUTPATIENT
Start: 2024-05-30 | End: 2024-05-30

## 2024-05-30 NOTE — PLAN OF CARE
Pt A&Ox4. VSS on RA, monitoring O2 sats. /IS. Saline locked. Dsg to left hip dry and intact. WBAT to LLE. Plan to discharge to home with HH once cleared. Call light within reach. Will continue to monitor.

## 2024-05-30 NOTE — PLAN OF CARE
Discharge instructions reviewed with patient. All questions answered. IV removed. Bedside belongings packed up and returned to patient. Meds to be delivered. Will be discharged to home with HH.

## 2024-05-30 NOTE — PROGRESS NOTES
[facility]   part of [facility]     Hospitalist Progress Note     Jordan Nelson Valencia Patient Status:  Inpatient    1945 MRN [id number]   Location [facility] 3SW-A Attending Greg Manuel MD   Hosp Day # 2 PCP Maco Julien DO     Chief Complaint: Medical management    Subjective:     Patient seen and examined. Denies pain. Denies SOB.    Objective:    Review of Systems:   A comprehensive review of systems was completed; pertinent positive and negatives stated in subjective.    Vital signs:  Temp:  [97.3 °F (36.3 °C)-97.7 °F (36.5 °C)] 97.5 °F (36.4 °C)  Pulse:  [] 154  Resp:  [16-18] 16  BP: (117-140)/(46-88) 117/46  SpO2:  [76 %-92 %] 76 %    Physical Exam:    General: No acute distress  Respiratory: Diminished bilaterally.   Cardiovascular: S1, S2, regular rate and rhythm  Abdomen: Soft, Non-tender, non-distended, positive bowel sounds  Neuro: No new focal deficits.   Extremities: No edema    Diagnostic Data:    Labs:  No results for input(s): \"WBC\", \"HGB\", \"MCV\", \"PLT\", \"BAND\", \"INR\" in the last 168 hours.    Invalid input(s): \"LYM#\", \"MONO#\", \"BASOS#\", \"EOSIN#\"    No results for input(s): \"GLU\", \"BUN\", \"CREATSERUM\", \"GFRAA\", \"GFRNAA\", \"CA\", \"ALB\", \"NA\", \"K\", \"CL\", \"CO2\", \"ALKPHO\", \"AST\", \"ALT\", \"BILT\", \"TP\" in the last 168 hours.    CrCl cannot be calculated (Patient's most recent lab result is older than the maximum 7 days allowed.).    No results for input(s): \"TROP\", \"TROPHS\", \"CK\" in the last 168 hours.    No results for input(s): \"PTP\", \"INR\" in the last 168 hours.               Microbiology    No results found for this visit on 24.      Imaging: Reviewed in Epic.    Medications:    tamsulosin  0.4 mg Oral QPM    rOPINIRole  2 mg Oral BID    pantoprazole  20 mg Oral QAM AC    finasteride  5 mg Oral Daily    clonazePAM  0.5 mg Oral Nightly    fluticasone furoate-vilanterol  1 puff Inhalation Daily    atorvastatin  60 mg Oral Daily    umeclidinium bromide  1  puff Inhalation Daily    albuterol  1 puff Inhalation BID    acetaminophen  650 mg Oral Q4H While awake    ketorolac  15 mg Intravenous Q6H    sennosides  17.2 mg Oral Nightly    docusate sodium  100 mg Oral BID    ferrous sulfate  325 mg Oral Daily with breakfast    aspirin  81 mg Oral BID    traMADol  50 mg Oral 2 times per day    cephalexin  500 mg Oral Q8H PATRICIO    insulin aspart  1-10 Units Subcutaneous TID AC and HS       Assessment & Plan:      #Left hip OA s/p THR 5/28  -Management per ortho  -PT/OT  -Pain control    #Hypoxia  -No longer requiring O2  -Possibly mild volume overload on CXR  -S/p IV lasix x 1  -Typically on O2 nocturnally      #COPD  -Continue home inhalers     #DMII  -SSI     #GERD  -PPI     #BPH  -Finasteride/flomax      #DL  -Statin     #Parkinson's disease  -Requip     #Disposition  -Stable for DC from hospitalist standpoint     Bayron Hahn DO    Supplementary Documentation:     Quality:  DVT Mechanical Prophylaxis:   SCDs, Early ambuation  DVT Pharmacologic Prophylaxis   Medication   None         DVT Pharmacologic prophylaxis: Aspirin 81 mg      Code Status: Not on file  Franco: No urinary catheter in place  Franco Duration (in days):   Central line:    VIVIANA: 5/30/2024    Discharge is dependent on: clinical progress  At this point Mr. Valencia is expected to be discharge to: home    The 21st Century Cures Act makes medical notes like these available to patients in the interest of transparency. Please be advised this is a medical document. Medical documents are intended to carry relevant information, facts as evident, and the clinical opinion of the practitioner. The medical note is intended as peer to peer communication and may appear blunt or direct. It is written in medical language and may contain abbreviations or verbiage that are unfamiliar.

## 2024-05-30 NOTE — PLAN OF CARE
Discharge instructions reviewed with patient. All questions answered. IV removed. Bedside belongings packed up and returned to patient. Will discharge to home with HH.

## 2024-05-31 ENCOUNTER — TELEPHONE (OUTPATIENT)
Dept: FAMILY MEDICINE CLINIC | Facility: CLINIC | Age: 79
End: 2024-05-31

## 2024-05-31 ENCOUNTER — PATIENT OUTREACH (OUTPATIENT)
Dept: CASE MANAGEMENT | Age: 79
End: 2024-05-31

## 2024-05-31 DIAGNOSIS — Z96.641 S/P TOTAL RIGHT HIP ARTHROPLASTY: ICD-10-CM

## 2024-05-31 DIAGNOSIS — Z02.9 ENCOUNTERS FOR UNSPECIFIED ADMINISTRATIVE PURPOSE: Primary | ICD-10-CM

## 2024-05-31 DIAGNOSIS — Z96.642 S/P TOTAL LEFT HIP ARTHROPLASTY: ICD-10-CM

## 2024-05-31 PROCEDURE — 1111F DSCHRG MED/CURRENT MED MERGE: CPT

## 2024-05-31 NOTE — DISCHARGE SUMMARY
[facility]  Discharge Summary    Jordan Valencia Patient Status:  Inpatient    1945 MRN [id number]   Location [facility] 3SW-A Attending No att. providers found   Hosp Day # 2 PCP Maco Julien DO     Date of Admission: 2024    Date of Discharge: 2024  3:53 PM    Admitting Diagnosis: Primary osteoarthritis of left hip [M16.12]  Arthritis of left hip    Discharge Diagnosis:   Patient Active Problem List   Diagnosis    Type 2 diabetes mellitus without complication, with long-term current use of insulin (HCC)    Mixed hyperlipidemia    Restless legs    Diabetic polyneuropathy associated with type 2 diabetes mellitus (HCC)    Benign prostatic hyperplasia with nocturia    Gait instability    Chronic midline low back pain with bilateral sciatica    Hepatic steatosis    Multiple renal cysts    CKD stage 3 due to type 2 diabetes mellitus (HCC)    Smokers' cough (HCC)    Pseudomonas aeruginosa colonization    Parkinson's disease (HCC)    Abnormal nuclear stress test    Centrilobular emphysema (HCC)    Depression, recurrent (HCC)    Dysphagia    Medicare annual wellness visit, subsequent    Hypertension    Arthritis of left knee    Calcified granuloma of lung (HCC)    Dyspnea on exertion    Pulmonary fibrosis, postinflammatory (HCC)    Congenital anomaly of cerebrovascular system (HCC)    Insomnia    Other abnormalities of gait and mobility    Chronic respiratory failure with hypoxia, on home oxygen therapy (HCC)    Arthritis of left hip    Chronic obstructive pulmonary disease (HCC)       Reason for Admission: Left hip replacement    Physical Exam: Awake and alert, no distress.  Neurovascular intact left lower extremity    History of Present Illness: 79-year-old male with left hip osteoarthritis failed nonsurgical treatment    Hospital Course: Admitted for left total hip arthroplasty.  Operation without complications.  Postoperatively patient with mild exacerbation of chronic  pulmonary issues, underwent diuresis and resolved.  Discharged on postoperative day #2    Consultations: Hospitalist, case management social work, PT OT    Procedures: Left total hip arthroplasty    Complications: None    Disposition: Home Health Care Services    Discharge Condition: Good    Discharge Medications:   Discharge Medication List as of 5/30/2024  3:03 PM        CONTINUE these medications which have CHANGED    Details   Omega-3 1000 MG Oral Cap Take 1 capsule by mouth daily., Historical      acetaminophen 500 MG Oral Tab Take 2 tablets (1,000 mg total) by mouth every 8 (eight) hours for 15 days., Normal, Disp-90 tablet, R-0      aspirin 81 MG Oral Tab EC Take 1 tablet (81 mg total) by mouth in the morning and 1 tablet (81 mg total) before bedtime., Normal, Disp-80 tablet, R-0      traMADol 50 MG Oral Tab Take 1 tablet (50 mg total) by mouth every 12 (twelve) hours for 15 days., Normal, Disp-30 tablet, R-0      cephalexin 500 MG Oral Cap Take 1 capsule (500 mg total) by mouth every 8 (eight) hours for 6 days., Normal, Disp-18 capsule, R-0      docusate sodium 100 MG Oral Cap Take 100 mg by mouth 2 (two) times daily as needed., Normal, Disp-30 capsule, R-0      oxyCODONE 5 MG Oral Tab Take 1 tablet (5 mg total) by mouth every 4 (four) hours as needed., Normal, Disp-40 tablet, R-0      celecoxib (CELEBREX) 200 MG Oral Cap Take 1 capsule (200 mg total) by mouth daily., Normal, Disp-30 capsule, R-0           CONTINUE these medications which have NOT CHANGED    Details   ipratropium-albuterol 0.5-2.5 (3) MG/3ML Inhalation Solution Take 3 mL by nebulization every 6 (six) hours as needed., Normal, Disp-90 mL, R-5      guaiFENesin  MG Oral Tablet 12 Hr Take 1 tablet (600 mg total) by mouth 2 (two) times daily., Historical      rOPINIRole 1 MG Oral Tab Take 2 tablets (2 mg total) by mouth 2 (two) times daily., Historical      glipiZIDE 10 MG Oral Tab Take 1 tablet (10 mg total) by mouth 2 (two) times daily  before meals., Historical      Glucose Blood (ONETOUCH VERIO) In Vitro Strip USE TO TEST TWICE DAILY, Normal, Disp-200 strip, R-1      albuterol 108 (90 Base) MCG/ACT Inhalation Aero Soln Inhale 2 puffs into the lungs every 4 (four) hours as needed., Normal, Disp-2 each, R-2      Lancets (ONETOUCH DELICA PLUS [id number]) Does not apply Misc 1 lancet  by Finger stick route 2 (two) times daily. Dx E11.9 non insulin dependent, Normal, Disp-200 each, R-1      BREO ELLIPTA 200-25 MCG/ACT Inhalation Aerosol Powder, Breath Activated INHALE 1 PUFF INTO THE LUNGS DAILY, Normal, Disp-60 each, R-5      Ferrous Sulfate 325 (65 Fe) MG Oral Tab Take 1 tablet (325 mg total) by mouth daily with breakfast. Every other day, Historical      Alogliptin Benzoate 25 MG Oral Tab 25 mg., Historical      Empagliflozin 25 MG Oral Tab Take 1 tablet by mouth daily., Historical      ergocalciferol 1.25 MG (19223 UT) Oral Cap Historical      ALBUTEROL (2.5 MG/3ML) 0.083% Inhalation Nebu Soln INHALE 3 ML BY NEBULIZATION EVERY 4 HOURS AS NEEDED FOR WHEEZING, Normal, Disp-75 mL, R-3      ipratropium-albuterol (COMBIVENT RESPIMAT)  MCG/ACT Inhalation Aero Soln Inhale 1 spray into the lungs 2 (two) times daily., Historical      omeprazole 20 MG Oral Capsule Delayed Release Take 2 capsules (40 mg total) by mouth daily., Historical      finasteride 5 MG Oral Tab Take 1 tablet (5 mg total) by mouth daily., Historical      metoprolol succinate 25 MG Oral Tablet 24 Hr Take 0.5 tablets (12.5 mg total) by mouth daily., Historical      cholecalciferol 25 MCG (1000 UT) Oral Tab Take 1 tablet (1,000 Units total) by mouth daily., Historical      Blood Glucose Monitoring Suppl (ONETOUCH VERIO REFLECT) w/Device Does not apply Kit 1 lancet by Finger stick route 2 (two) times daily. Please send what is covered under patient formulary. DX  E11.9 DM2 controlled w/o insulin, Normal, Disp-1 kit, R-0      clonazePAM 0.5 MG Oral Tab Take 1 tablet (0.5 mg total) by  mouth nightly., Historical      atorvastatin 40 MG Oral Tab Take 1.5 tablets (60 mg total) by mouth daily., Historical      tamsulosin (FLOMAX) cap Take 1 capsule (0.4 mg total) by mouth every evening., Historical      Melatonin 10 MG Oral Tab Take 2 tablets by mouth nightly., Historical           STOP taking these medications       HYDROcodone-acetaminophen 5-325 MG Oral Tab              Follow up Visits: Follow-up with next in 2 weeks  Other Discharge Instructions: Weightbearing as tolerated, posterior hip precautions    Greg Manuel MD  5/31/2024  7:07 AM

## 2024-05-31 NOTE — TELEPHONE ENCOUNTER
Spoke to pt for TCM today.  Pt does not have an appointment scheduled at this time.  TCM appt recommended by 6/13/24 as pt is a moderate risk for readmission.  Please advise.    BOOK BY DATE (last date for TCM): 06/13/24    Clinical staff:  Please f/u with pt and try to get them to schedule as pt would greatly benefit from TCM appt.  Thank you!

## 2024-05-31 NOTE — PROGRESS NOTES
Initial Post Discharge Follow Up   Discharge Date: 5/30/24  Contact Date: 5/31/2024    Consent Verification:  Assessment Completed With: Other: Osiris-daughter Permission received per patient?  written  HIPAA Verified?  Yes    Discharge Dx:   LEFT POSTERIOR CEMENTED TOTAL HIP ARTHROPLASTY     General:   How have you been since your discharge from the hospital? NCM spoke with pt's daughter Osiris, states pt is doing well. He is getting around at home with his walker and he also uses commode. Pt's BS this morning at 124. She states [facility] nurse will be stopping by today. Pt is icing left hip and taking pain medications as prescribed. He denies any constipation.   Do you have any pain since discharge?  Yes  Where: left hip pain    Rating on pain scale 1-10, 10 being the worst pain you have ever experienced, what is current pain: 6  Alleviating factors: Tylenol, tramadol and Oxycodone as needed.   Aggravating factors: moving or getting up  Is the pain manageable at home? Yes  How well was your pain managed while in the hospital?   On a scale of 1-5   1- Very Poor and 5- Very well   5  When you were leaving the hospital were your discharge instructions reviewed with you? Yes  How well were your discharge instructions explained to you?   On a scale of 1-5   1- Very Poor and 5- Very well   5  Do you have any questions about your discharge instructions?  No  Before leaving the hospital was your diagnoses explained to you? Yes  Do you have any questions about your diagnoses? No  Are you able to perform normal daily activities of living as you have prior to your hospital stay (dressing, bathing, ambulating to the bathroom, etc)? yes  (HANG) Was patient given a different diet per AVS? no      Medications: Reviewed medication list.  Medications are up to date.  Current Outpatient Medications   Medication Sig Dispense Refill    [START ON 7/10/2024] Omega-3 1000 MG Oral Cap Take 1 capsule by mouth daily.      acetaminophen  500 MG Oral Tab Take 2 tablets (1,000 mg total) by mouth every 8 (eight) hours for 15 days. 90 tablet 0    aspirin 81 MG Oral Tab EC Take 1 tablet (81 mg total) by mouth in the morning and 1 tablet (81 mg total) before bedtime. 80 tablet 0    traMADol 50 MG Oral Tab Take 1 tablet (50 mg total) by mouth every 12 (twelve) hours for 15 days. 30 tablet 0    cephalexin 500 MG Oral Cap Take 1 capsule (500 mg total) by mouth every 8 (eight) hours for 6 days. 18 capsule 0    docusate sodium 100 MG Oral Cap Take 100 mg by mouth 2 (two) times daily as needed. 30 capsule 0    oxyCODONE 5 MG Oral Tab Take 1 tablet (5 mg total) by mouth every 4 (four) hours as needed. 40 tablet 0    celecoxib (CELEBREX) 200 MG Oral Cap Take 1 capsule (200 mg total) by mouth daily. 30 capsule 0    ipratropium-albuterol 0.5-2.5 (3) MG/3ML Inhalation Solution Take 3 mL by nebulization every 6 (six) hours as needed. 90 mL 5    guaiFENesin  MG Oral Tablet 12 Hr Take 1 tablet (600 mg total) by mouth 2 (two) times daily.      rOPINIRole 1 MG Oral Tab Take 2 tablets (2 mg total) by mouth 2 (two) times daily.      glipiZIDE 10 MG Oral Tab Take 1 tablet (10 mg total) by mouth 2 (two) times daily before meals.      Glucose Blood (ONETOUCH VERIO) In Vitro Strip USE TO TEST TWICE DAILY 200 strip 1    albuterol 108 (90 Base) MCG/ACT Inhalation Aero Soln Inhale 2 puffs into the lungs every 4 (four) hours as needed. 2 each 2    Lancets (ONETOUCH DELICA PLUS [id number]) Does not apply Misc 1 lancet  by Finger stick route 2 (two) times daily. Dx E11.9 non insulin dependent 200 each 1    BREO ELLIPTA 200-25 MCG/ACT Inhalation Aerosol Powder, Breath Activated INHALE 1 PUFF INTO THE LUNGS DAILY 60 each 5    Ferrous Sulfate 325 (65 Fe) MG Oral Tab Take 1 tablet (325 mg total) by mouth daily with breakfast. Every other day      Alogliptin Benzoate 25 MG Oral Tab 25 mg.      Empagliflozin 25 MG Oral Tab Take 1 tablet by mouth daily.      ergocalciferol 1.25 MG  (16380 UT) Oral Cap       ALBUTEROL (2.5 MG/3ML) 0.083% Inhalation Nebu Soln INHALE 3 ML BY NEBULIZATION EVERY 4 HOURS AS NEEDED FOR WHEEZING 75 mL 3    ipratropium-albuterol (COMBIVENT RESPIMAT)  MCG/ACT Inhalation Aero Soln Inhale 1 spray into the lungs 2 (two) times daily.      omeprazole 20 MG Oral Capsule Delayed Release Take 2 capsules (40 mg total) by mouth daily.      finasteride 5 MG Oral Tab Take 1 tablet (5 mg total) by mouth daily.      metoprolol succinate 25 MG Oral Tablet 24 Hr Take 0.5 tablets (12.5 mg total) by mouth daily.      cholecalciferol 25 MCG (1000 UT) Oral Tab Take 1 tablet (1,000 Units total) by mouth daily.      Blood Glucose Monitoring Suppl (ONETOUCH VERIO REFLECT) w/Device Does not apply Kit 1 lancet by Finger stick route 2 (two) times daily. Please send what is covered under patient formulary. DX  E11.9 DM2 controlled w/o insulin 1 kit 0    clonazePAM 0.5 MG Oral Tab Take 1 tablet (0.5 mg total) by mouth nightly.      atorvastatin 40 MG Oral Tab Take 1.5 tablets (60 mg total) by mouth daily.      tamsulosin (FLOMAX) cap Take 1 capsule (0.4 mg total) by mouth every evening.      Melatonin 10 MG Oral Tab Take 2 tablets by mouth nightly.       Were there any changes to your current medication(s) noted on the AVS? Yes  If so, were these medication changes discussed with you prior to leaving the hospital? Yes  If a new medication was prescribed:    Was the new medication's purpose & side effects reviewed? Yes  Do you have any questions about your new medication? No  Did you  your discharge medications when you left the hospital? Yes  Let's go over your medications together to make sure we are not missing anything. Medications Reviewed  Are there any reasons that keep you from taking your medication as prescribed? No  Are you having any concerns with constipation? No      Discharge medications reviewed/discussed/and reconciled against outpatient medications with patient.  Any  changes or updates to medications sent to PCP.  Patient Acknowledged     Referrals/orders at D/C:  Referrals/orders placed at D/C? yes  What services:   Home health and PT   (If HH was ordered) Has HH been set up?  Yes    If Yes: With Whom: Zenaida SILVAC    DME ordered at D/C? No      Discharge orders, AVS reviewed and discussed with patient. Any changes or updates to orders sent to PCP.  Patient Acknowledged      SDOH:   Transportation Needs: No Transportation Needs (5/31/2024)    Transportation Needs     Lack of Transportation: No     Car Seat: Not on file     Financial Resource Strain: Low Risk  (5/31/2024)    Financial Resource Strain     Difficulty of Paying Living Expenses: Not hard at all     Med Affordability: Not on file         Diagnosis specifics:   Ortho Spine:  Has there been a change in your incision/wound since d/c?  no   If Yes: Do you see an increase in drainage, swelling, and or hot to touch? No  Are you following your exercise program (exercises for total joints and walking only for spine surgery)  yes   When you were in the hospital after your surgery, were you offered anything other than medications to help with your pain management?  yes  Do you remember what this might have been?         (put X by all the patient mentions:        x   repositioning            sleep/relaxation kit              music           TV         I-pad                       reading          x   Cold-pack      other__________________________)   Did you understand when to take your pain medication at home? yes  On a scale of 1-5   1- Very Poor and 5- Very well   5  Comments:  Was it clear how to use less narcotic pain medication as your pain decreased at home? yes   On a scale of 1-5   1- Very Poor/unclear and 5- Very well/clear  5  Comments:  Were you satisfied with the discharge process?  yes      Follow up appointments:      Your appointments       Date & Time Appointment Department ([place])    Jun 12, 2024 9:40 AM CDT  Post Op Visit with Shun Dominguez PA-C [facility], [address] [place] [address] ([facility]        Jun 13, 2024 10:45 AM CDT PT Ortho Post Op Evaluation with Mee Aguilar, PT [facility] [place] ([facility] [place])        Jun 17, 2024 11:45 AM CDT Physical Therapy Ortho Treatment with Shon Martinez, PT [facility] [place] ([facility] [place])        Jun 20, 2024 9:15 AM CDT Physical Therapy Ortho Treatment with Mee Aguilar, PT [facility] [place] ([facility] [place])        Jun 24, 2024 9:15 AM CDT Physical Therapy Ortho Treatment with Danika Aguilara, PT [facility] [place] ([facility] [place])        Jun 27, 2024 9:15 AM CDT Physical Therapy Ortho Treatment with Mee Aguilar, PT [facility] [place] ([facility] [place])        Jul 01, 2024 9:15 AM CDT Physical Therapy Ortho Treatment with Mee Aguilar, PT [facility] [place] ([facility] [place])        Jul 05, 2024 10:00 AM CDT Physical Therapy Ortho Treatment with Mee Aguilar, PT [facility] [place] ([facility] [place])        Jul 08, 2024 9:15 AM CDT Physical Therapy Ortho Treatment with Danika Aguilara, PT [facility] [place] ([facility] [place])        Jul 11, 2024 9:15 AM CDT Physical Therapy Ortho Treatment with Danika Aguilara, PT [facility] [place] ([facility] [place])        Jul 15, 2024 9:15 AM CDT Physical Therapy Ortho Treatment with Mee Aguilar, PT [facility] [place] ([facility] [place])        Jul 18, 2024 9:15 AM CDT Physical Therapy Ortho Treatment with Danika Aguilara, PT [facility] [place] ([facility] [place])        Jul 22, 2024 9:15 AM CDT Physical Therapy Ortho Treatment with JeffDanika zimmermana, PT [facility] [place] ([facility] [place])               [facility] [place]  [facility] [place]  [address]  [facility] [address]  [phone] [facility] [address] [place], [facility] [employer]  [address] [place] [address]  [facility] [id number] [phone]            TCC  Was TCC ordered: No      PCP (If no TCC appointment)  Does patient already have a PCP appointment scheduled? No  NCM Attempted to schedule PCP office TCM appointment with patient   If no appointment scheduled: Explain: pt's daughter declined will schedule on her own.     Specialist    Does the patient have any other follow up appointment(s) needing to be scheduled? No      Is there any reason as to why you cannot make your appointment(s)?  No     Needs post D/C:   Now that you are home, are there any needs or concerns you need addressed before your next visit with your PCP?  (DME, meds, questions, etc.): No    Interventions by NCM:   All discharge instructions reviewed with the patient. Reviewed when to call MD vs when to call 911 or go the ED. Educated patient on the importance of taking all meds as prescribed as well as close f/u with PCP/specialists. Pt verbalized understanding and will contact the office with any further questions or concerns. Patient denies fevers, chills, nausea, vomiting, shortness of breath, chest pain, or any other symptoms at this time.  NCM attempted to schedule HFU, patient declined will call PCP/TCC office directly. NCM sent TE to PCP office re: assistance in scheduling HFU appt. NCM provided contact information for any further questions/concerns. Patient verbalized understanding and agreeable.       Overall Rating:   How would you rate the care you received while in the hospital? good    CCM referral placed:    No    BOOK BY DATE: 06/13/24

## 2024-05-31 NOTE — PAYOR COMM NOTE
--------------  DISCHARGE REVIEW    Payor: [employer]  Subscriber #:  [id number]  Authorization Number: [id number]    Admit date: 24  Admit time:  11:35 AM  Discharge Date: 2024  3:53 PM     Admitting Physician: Greg Manuel MD  Attending Physician:  No att. providers found  Primary Care Physician: Maco Julien DO          Discharge Summary Notes        Discharge Summary signed by Greg Manuel MD at 2024  7:08 AM       Author: Greg Manuel MD Specialty: Surgery, Orthopedic Adult Reconstructive, SURGERY, ORTHOPEDIC Author Type: Physician    Filed: 2024  7:08 AM Date of Service: 2024  7:07 AM Status: Signed    : Greg Manuel MD (Physician)         [facility]  Discharge Summary    Jordan Valencia Patient Status:  Inpatient    1945 MRN [id number]   Location [facility] 3SW-A Attending No att. providers found   Hosp Day # 2 PCP Maco Julien DO     Date of Admission: 2024    Date of Discharge: 2024  3:53 PM    Admitting Diagnosis: Primary osteoarthritis of left hip [M16.12]  Arthritis of left hip    Discharge Diagnosis:   Patient Active Problem List   Diagnosis    Type 2 diabetes mellitus without complication, with long-term current use of insulin (HCC)    Mixed hyperlipidemia    Restless legs    Diabetic polyneuropathy associated with type 2 diabetes mellitus (HCC)    Benign prostatic hyperplasia with nocturia    Gait instability    Chronic midline low back pain with bilateral sciatica    Hepatic steatosis    Multiple renal cysts    CKD stage 3 due to type 2 diabetes mellitus (HCC)    Smokers' cough (HCC)    Pseudomonas aeruginosa colonization    Parkinson's disease (HCC)    Abnormal nuclear stress test    Centrilobular emphysema (HCC)    Depression, recurrent (HCC)    Dysphagia    Medicare annual wellness visit, subsequent    Hypertension    Arthritis of left knee    Calcified granuloma of lung (HCC)    Dyspnea on exertion     Pulmonary fibrosis, postinflammatory (HCC)    Congenital anomaly of cerebrovascular system (HCC)    Insomnia    Other abnormalities of gait and mobility    Chronic respiratory failure with hypoxia, on home oxygen therapy (HCC)    Arthritis of left hip    Chronic obstructive pulmonary disease (HCC)       Reason for Admission: Left hip replacement    Physical Exam: Awake and alert, no distress.  Neurovascular intact left lower extremity    History of Present Illness: 79-year-old male with left hip osteoarthritis failed nonsurgical treatment    Hospital Course: Admitted for left total hip arthroplasty.  Operation without complications.  Postoperatively patient with mild exacerbation of chronic pulmonary issues, underwent diuresis and resolved.  Discharged on postoperative day #2    Consultations: Hospitalist, case management social work, PT OT    Procedures: Left total hip arthroplasty    Complications: None    Disposition: Home Health Care Services    Discharge Condition: Good    Discharge Medications:   Discharge Medication List as of 5/30/2024  3:03 PM        CONTINUE these medications which have CHANGED    Details   Omega-3 1000 MG Oral Cap Take 1 capsule by mouth daily., Historical      acetaminophen 500 MG Oral Tab Take 2 tablets (1,000 mg total) by mouth every 8 (eight) hours for 15 days., Normal, Disp-90 tablet, R-0      aspirin 81 MG Oral Tab EC Take 1 tablet (81 mg total) by mouth in the morning and 1 tablet (81 mg total) before bedtime., Normal, Disp-80 tablet, R-0      traMADol 50 MG Oral Tab Take 1 tablet (50 mg total) by mouth every 12 (twelve) hours for 15 days., Normal, Disp-30 tablet, R-0      cephalexin 500 MG Oral Cap Take 1 capsule (500 mg total) by mouth every 8 (eight) hours for 6 days., Normal, Disp-18 capsule, R-0      docusate sodium 100 MG Oral Cap Take 100 mg by mouth 2 (two) times daily as needed., Normal, Disp-30 capsule, R-0      oxyCODONE 5 MG Oral Tab Take 1 tablet (5 mg total) by mouth  every 4 (four) hours as needed., Normal, Disp-40 tablet, R-0      celecoxib (CELEBREX) 200 MG Oral Cap Take 1 capsule (200 mg total) by mouth daily., Normal, Disp-30 capsule, R-0           CONTINUE these medications which have NOT CHANGED    Details   ipratropium-albuterol 0.5-2.5 (3) MG/3ML Inhalation Solution Take 3 mL by nebulization every 6 (six) hours as needed., Normal, Disp-90 mL, R-5      guaiFENesin  MG Oral Tablet 12 Hr Take 1 tablet (600 mg total) by mouth 2 (two) times daily., Historical      rOPINIRole 1 MG Oral Tab Take 2 tablets (2 mg total) by mouth 2 (two) times daily., Historical      glipiZIDE 10 MG Oral Tab Take 1 tablet (10 mg total) by mouth 2 (two) times daily before meals., Historical      Glucose Blood (ONETOUCH VERIO) In Vitro Strip USE TO TEST TWICE DAILY, Normal, Disp-200 strip, R-1      albuterol 108 (90 Base) MCG/ACT Inhalation Aero Soln Inhale 2 puffs into the lungs every 4 (four) hours as needed., Normal, Disp-2 each, R-2      Lancets (ONETOUCH DELICA PLUS [id number]) Does not apply Misc 1 lancet  by Finger stick route 2 (two) times daily. Dx E11.9 non insulin dependent, Normal, Disp-200 each, R-1      BREO ELLIPTA 200-25 MCG/ACT Inhalation Aerosol Powder, Breath Activated INHALE 1 PUFF INTO THE LUNGS DAILY, Normal, Disp-60 each, R-5      Ferrous Sulfate 325 (65 Fe) MG Oral Tab Take 1 tablet (325 mg total) by mouth daily with breakfast. Every other day, Historical      Alogliptin Benzoate 25 MG Oral Tab 25 mg., Historical      Empagliflozin 25 MG Oral Tab Take 1 tablet by mouth daily., Historical      ergocalciferol 1.25 MG (98681 UT) Oral Cap Historical      ALBUTEROL (2.5 MG/3ML) 0.083% Inhalation Nebu Soln INHALE 3 ML BY NEBULIZATION EVERY 4 HOURS AS NEEDED FOR WHEEZING, Normal, Disp-75 mL, R-3      ipratropium-albuterol (COMBIVENT RESPIMAT)  MCG/ACT Inhalation Aero Soln Inhale 1 spray into the lungs 2 (two) times daily., Historical      omeprazole 20 MG Oral Capsule  Delayed Release Take 2 capsules (40 mg total) by mouth daily., Historical      finasteride 5 MG Oral Tab Take 1 tablet (5 mg total) by mouth daily., Historical      metoprolol succinate 25 MG Oral Tablet 24 Hr Take 0.5 tablets (12.5 mg total) by mouth daily., Historical      cholecalciferol 25 MCG (1000 UT) Oral Tab Take 1 tablet (1,000 Units total) by mouth daily., Historical      Blood Glucose Monitoring Suppl (ONETOUCH VERIO REFLECT) w/Device Does not apply Kit 1 lancet by Finger stick route 2 (two) times daily. Please send what is covered under patient formulary. DX  E11.9 DM2 controlled w/o insulin, Normal, Disp-1 kit, R-0      clonazePAM 0.5 MG Oral Tab Take 1 tablet (0.5 mg total) by mouth nightly., Historical      atorvastatin 40 MG Oral Tab Take 1.5 tablets (60 mg total) by mouth daily., Historical      tamsulosin (FLOMAX) cap Take 1 capsule (0.4 mg total) by mouth every evening., Historical      Melatonin 10 MG Oral Tab Take 2 tablets by mouth nightly., Historical           STOP taking these medications       HYDROcodone-acetaminophen 5-325 MG Oral Tab              Follow up Visits: Follow-up with next in 2 weeks  Other Discharge Instructions: Weightbearing as tolerated, posterior hip precautions    Greg Manuel MD  5/31/2024  7:07 AM    Electronically signed by Greg Manuel MD on 5/31/2024  7:08 AM         REVIEWER COMMENTS

## 2024-06-03 ENCOUNTER — TELEPHONE (OUTPATIENT)
Dept: FAMILY MEDICINE CLINIC | Facility: CLINIC | Age: 79
End: 2024-06-03

## 2024-06-03 NOTE — TELEPHONE ENCOUNTER
Daughter calling  patient underwent left posterior cemented total hip arthroplasty. \[facility] [place] [facility]\"" patient's procedure went well but he is now in [facility] with pnuemonia and would like to inform Dr. Julien. Endorsed to MARÍA.

## 2024-06-10 ENCOUNTER — TELEPHONE (OUTPATIENT)
Dept: PHYSICAL THERAPY | Facility: HOSPITAL | Age: 79
End: 2024-06-10

## 2024-06-11 NOTE — PROGRESS NOTES
POST-OP HIP EVALUATION:     Diagnosis:   Status post left hip replacement (Z96.642)             Referring Provider: Greg Manuel  Date of Evaluation:    6/11/2024    Precautions:  Fall Risk, Weightbearing as tolerated, posterior hip precautions Next MD visit:   6/17/24  Date of Surgery: 5/28/24     PATIENT SUMMARY   Jordan Valencia is a 79 year old male who presents to therapy today s/p L EMILY with posterior approach on 5/28/24. He was re-hospitalized at [facility] on 6/3/24 2/2 pneumonia. He was d/c on 6/11/24 and did not get HHPT. He states he did not get  much PT while hospitalized. Current complaints include weakness, difficulty walking, pain, swelling, sore and short of breath, fatigued. He is using breath treatments, nebulizer and inhalers. He does use 2.5L supplemental O2 at night.  He reports 3 falls in the last 3 months. \"Just lose my balance\"  Pt and daughter complain of severe L LE weakness. He still has antibiotics for another week. He states he had an bronchoscpy while hopsitalized and is still spitting up stuff.   Hip/WB Precautions: WBAT posterior hip precautions  Pt describes pain level current 1/10, at best 0/10, at worst 6/10.   Current functional limitations include stair negotiation, bed mobility, transfers, walking through a room, dressing, standing tolerance < 5 mins, grooming and hygiene, meal prep, light to heavy ADLs.     Home with daughter, 3 stairs to enter B Hrs, 1 small step down to bedroom, approx 3 in height. He does not drive and states he was doing little ADLs for himself prior to sx. Pt has walk in shower with 2 safety bars. Since getting pneumonia, his tolerance is way down. Standing at sink for grooming and bathing exhausts him.     Jordan describes prior level of function SPC as AD, last year, since March 2024 he has been RW as AD. He does have a mobility scooter, [employer]. Pt goals include \"so I can walk\" Pt would like to get back to fishing.  Past medical history was  reviewed with Jordan. Significant findings include  has a past medical history of Arrhythmia, Back problem, BPH (benign prostatic hyperplasia), COPD (chronic obstructive pulmonary disease) ([facility]), Diabetes ([facility]), Esophageal reflux, Hearing impairment, High blood pressure, High cholesterol, Muscle weakness, Osteoarthritis, Parkinson disease (HCC), PONV (postoperative nausea and vomiting), Problems with swallowing, Renal disorder, Restless leg syndrome, and Visual impairment. L TKA  2023      ASSESSMENT  Jordan presents to physical therapy evaluation s/p L EMILY with posterior approach on 5/28/24 complicated by rehospitalization 2/2 pneumonia on 6/3/24 with d/c 6/11/24.  His primary co are weakness, difficulty walking, pain, swelling, shortness of breath, fatigue and limited activity tolerance. The results of the objective tests and measures show limited activity tolerance with reduction in SPO2 on exertion, decreased LE strength and force production, poor gait efficiency, limited SLS time impacting balance and walking along with poor flexibility of BLEs salena hamstrings limiting heel strike when ambulating and posture.  Functional deficits include but are not limited to stair negotiation, bed mobility, transfers, walking through a room, dressing, standing tolerance < 5 mins, grooming and hygiene, meal prep, light to heavy ADLs. Signs and symptoms are consistent with diagnosis of s/p L EMILY with complication of pneumonia. Pt and PT discussed evaluation findings, pathology, POC and HEP.  Pt's comorbidities and psychosocial issues may impact PT POC and pt progress. Pt voiced understanding and performs HEP correctly without reported pain. Skilled Physical Therapy is medically necessary to address the above impairments and reach functional goals.     OBJECTIVE:   Observation/Skin: medial L knee collapse with STS, shuffling gait  Palpation: edema noted on LLE globally  Sensation: tingling B feet baseline 2/2 diabetic  neuropathy  Vitals; SPO2 on RA: 95% HR 67 RR  20  AROM: (* denotes performed with pain)  Hip   Flexion: R 92; L 65**  Extension: R WFL; L NT  Abduction: R 20; L 20*  ER: R WFL; L NT  IR: R WFL; L NT  Knee FLEX R 128 L 118 deg  EXT R 0 L 0 deg n     PROM: (* denotes performed with pain)  Hip   Flexion: R NT; L 75 deg       Flexibility:   Hamstrings: R 45; L 40 deg   Piriformis: R min; L mod  Quads: R mod; L mod  Hip Flexor: R mod, L mod    Strength/MMT: (* denotes performed with pain)  Hip Knee   Flexion: R 4+/5; L 3-*/5  Extension: R 3/5; L 3/5  Abduction: R 4/5; L 4-*/5  ER: R 4-/5; L NT/5  IR: R 4-/5; L NT/5 Flexion: R 5/5; L 4/5  Extension: R 4+/5; L 4-*/5        Balance: SLS: R 15 sec, L 10 sec each with single UE support, pt unable to balance without UEs    Functional Mobility:  30 sec sit<>stand: 6 reps UE leverage required Spo2 88% HR 79 recovers within 30 secs with PLB and seated rest to 92% HR 72  TUG (AD, time): 40 sec   RW  Gait: pt ambulates on level ground with assistive device of RW, decreased step length RLE, decreased stance phase LLE, decreased hip/knee flex or ext LLE, shuffle, decreased foot clearance BLE, and stooped posture/forward lean  Today’s Treatment and Response:   Pt education was provided on exam findings, treatment diagnosis, treatment plan, expectations, and prognosis. Pt was also provided recommendations for activity modifications, possible soreness after evaluation, modalities as needed [ice/heat], ergonomics, importance of remaining active, and strategies to reduce fall risk at home.  Patient was instructed in and issued a HEP for: Access Code: [email or web address]  URL: [email or web address]  Date: 06/13/2024  Prepared by: Neena Aguilar    Exercises  - Seated Ankle Pumps on Table  - 1 x daily - 7 x weekly - 5 sets - 10 reps  - Supine Heel Slides  - 1 x daily - 7 x weekly - 3 sets - 10 reps  - Supine Bridge  - 1 x daily - 7 x weekly - 2 sets - 10 reps - 3 secs hold  - Sit  to Stand with Armchair  - 1 x daily - 7 x weekly - 2 sets - 10 reps  - Seated Hip Abduction with Resistance  - 4 x weekly - 3 sets - 10 reps    Ed on PT POC and prognosis given comorbidities. Resources for [facility] provided    Charges: PT Eval Moderate Complexity, 1 ther ex   1 ther ac   Total Timed Treatment: 23 min     Total Treatment Time: 70 min     Based on clinical rationale and outcome measures, this evaluation involved Moderate Complexity decision making due to 3+ personal factors/comorbidities, 4+ body structures involved/activity limitations, and evolving symptoms including vital sign response and changing pain levels.  PLAN OF CARE:    Goals: (To be met in 20 visits)   Pt will have improved hip AROM Flex to 90 deg and ABD to >26 deg to be able to don/doff shoes and perform car transfers without difficulty  Pt will improve hip ABD and ER strength to 4+/5 to increase ease with standing and walking   Pt will demonstrate improved SLS to > 2 seconds MERCEDES without UE support to promote safety and decrease risk of falls on uneven surfaces such as grass  Pt will perform TUG in <25 seconds, with LRAD demonstrating improved gait speed for improved participation in ADL such as community ambulation  Pt will demonstrate increased LE strength and force production as evidenced by 8 reps on 30 sec STS.  Pt will be able to squat to  light objects around the house with <2/10 hip pain   Pt will improve functional hip strength to report ability to ascend/descend 5 stairs with use of handrail  Pt will be independent and compliant with comprehensive HEP to maintain progress achieved in PT    Frequency / Duration: Patient will be seen for 1-2 x/week or a total of 20 visits over a 90 day period.  Treatment will include: Gait training, Manual Therapy, Mechanical Traction, Neuromuscular Re-education, Self-Care Home Management, Therapeutic Activities, Therapeutic Exercise, Home Exercise Program instruction, and  Modalities to include: Electrical stimulation (unattended), Electrical stimulation (attended), and Ultrasound    Education or treatment limitation:  see assessment  Rehab Potential:good    LEFS Score  LEFS Score: 25 % (6/12/2024 11:26 AM)      Patient/Family/Caregiver was advised of these findings, precautions, and treatment options and has agreed to actively participate in planning and for this course of care.    Thank you for your referral. Please co-sign or sign and return this letter via fax as soon as possible to [phone]. If you have any questions, please contact me at Dept: [phone]    Sincerely,  Electronically signed by therapist: Mee Aguilar, PT, DPT  Physician's certification required: Yes  I certify the need for these services furnished under this plan of treatment and while under my care.    X___________________________________________________ Date____________________    Certification From: 6/11/2024  To:9/9/2024

## 2024-06-13 ENCOUNTER — OFFICE VISIT (OUTPATIENT)
Dept: PHYSICAL THERAPY | Age: 79
End: 2024-06-13
Attending: ORTHOPAEDIC SURGERY
Payer: MEDICARE

## 2024-06-13 DIAGNOSIS — M16.12 PRIMARY OSTEOARTHRITIS OF LEFT HIP: Primary | ICD-10-CM

## 2024-06-13 PROCEDURE — 97110 THERAPEUTIC EXERCISES: CPT

## 2024-06-13 PROCEDURE — 97162 PT EVAL MOD COMPLEX 30 MIN: CPT

## 2024-06-13 PROCEDURE — 97530 THERAPEUTIC ACTIVITIES: CPT

## 2024-06-17 ENCOUNTER — APPOINTMENT (OUTPATIENT)
Dept: PHYSICAL THERAPY | Age: 79
End: 2024-06-17
Attending: ORTHOPAEDIC SURGERY
Payer: MEDICARE

## 2024-06-17 ENCOUNTER — OFFICE VISIT (OUTPATIENT)
Facility: CLINIC | Age: 79
End: 2024-06-17
Payer: MEDICARE

## 2024-06-17 VITALS — HEIGHT: 71 IN | BODY MASS INDEX: 26.04 KG/M2 | WEIGHT: 186 LBS

## 2024-06-17 DIAGNOSIS — Z96.642 STATUS POST LEFT HIP REPLACEMENT: Primary | ICD-10-CM

## 2024-06-17 PROCEDURE — 99024 POSTOP FOLLOW-UP VISIT: CPT | Performed by: PHYSICIAN ASSISTANT

## 2024-06-17 RX ORDER — AMOXICILLIN AND CLAVULANATE POTASSIUM 875; 125 MG/1; MG/1
1 TABLET, FILM COATED ORAL 2 TIMES DAILY
COMMUNITY
Start: 2024-06-11 | End: 2024-06-18

## 2024-06-17 RX ORDER — CIPROFLOXACIN 500 MG/1
500 TABLET, FILM COATED ORAL 2 TIMES DAILY
COMMUNITY
Start: 2024-06-11 | End: 2024-06-18

## 2024-06-17 RX ORDER — PREDNISONE 10 MG/1
10 TABLET ORAL
COMMUNITY
Start: 2024-06-11 | End: 2024-06-26

## 2024-06-17 NOTE — PROGRESS NOTES
[facility] Progress Note    Subjective: Patient returns to clinic 3 weeks status post left posterior cemented total hip arthroplasty performed on 5/28/24.  They have been taking Celebrex for pain control.  They continue to take aspirin for DVT prophylaxis. They continue to abide by posterior hip precautions. They are overall satisfied with their progress.  Denies any fevers, chills, night sweats.  No redness or drainage from the incision concerning for infection. He unfortunately was admitted post-operatively to [facility] for acute hypoxemic respiratory failure likely d/t exacerbation of COPD and multifocal pseudomonas pneumonia. He was treated with oral steroids, IV antibiotics and was discharged with Augmentin and Cipro as well as a prednisone taper. He has been able to perform physical therapy without much difficulty at this point.    Objective: Patient is seated comfortably in the exam chair. Alert and oriented. Nonlabored breathing. Grossly neurologically intact. Incision is clean, dry, and intact with steri-strips in place without any redness or drainage concerning for infection. Calves are soft and nontender.    Assessment/Plan:  Patient is doing well now 2 weeks s/p left posterior cemented total hip arthoplasty. They may get their incision wet in the shower at this point in time, avoiding soaking or submerging the incision in a pool or tub until 6 weeks postop. Continue with aspirin for DVT prophylaxis. Continue with posterior hip precautions for another 4 weeks. Outpatient physical therapy referral written.  Follow-up in 4 weeks with Dr. Manuel for routine 6-week postoperative visit or sooner if any concerns.  The patient's questions were sought and answered satisfactorily.  They are happy with the plan and will follow as advised.    X-rays needed at next visit: Postoperative radiographs left hip        Shun Dominguez PA-C  [facility] Orthopedic Surgery    This note  was dictated using Dragon software.  While it was briefly proofread prior to completion, some grammatical, spelling, and word choice errors due to dictation may still occur.

## 2024-06-18 ENCOUNTER — OFFICE VISIT (OUTPATIENT)
Dept: PHYSICAL THERAPY | Age: 79
End: 2024-06-18
Attending: ORTHOPAEDIC SURGERY
Payer: MEDICARE

## 2024-06-18 PROCEDURE — 97110 THERAPEUTIC EXERCISES: CPT

## 2024-06-18 NOTE — PROGRESS NOTES
Diagnosis:   Status post left hip replacement (Z96.642       Referring Provider: Greg Manuel  Date of Evaluation:    6/11/24    Precautions:   Posterior hip precautions until  7/8/24  Fall risk Next MD visit:   7/8/24  Date of Surgery: n/a   Insurance Primary/Secondary: [employer] [facility] / N/A     # Auth Visits: Medical Necessity            Subjective: Pt states he is feeling SOB at rest today, but attributes it the heat and humidity, has been staying indoors and using is incentive spirometer regularly. Had MD follow up, states it went well.    Pain: 0/10       Objective:   6/18/24- Resting vitals: Spo2 on RA 90% HR 69 following Nu step 96% HR 76 within 30 sec rest 91% mod JOSÉ MIGUEL 3/10 following standing ex 92% HR 74 mod josé miguel 3/10  All of the below objective measures are from evaluation for reference:  AROM: (* denotes performed with pain)  Hip   Flexion: R 92; L 65**  Extension: R WFL; L NT  Abduction: R 20; L 20*  ER: R WFL; L NT  IR: R WFL; L NT  Knee FLEX R 128 L 118 deg  EXT R 0 L 0 deg n     Balance: SLS: R 15 sec, L 10 sec each with single UE support, pt unable to balance without UEs   Functional Mobility:  30 sec sit<>stand: 6 reps UE leverage required Spo2 88% HR 79 recovers within 30 secs with PLB and seated rest to 92% HR 72  TUG (AD, time): 40 sec   RW    Assessment: Frequent rest breaks 2/2 SOB and exertion. Mod cues for neutral spine and hip for standing ther ex and posterior weight shift on squats.      Goals:    (To be met in 20 visits)   Pt will have improved hip AROM Flex to 90 deg and ABD to >26 deg to be able to don/doff shoes and perform car transfers without difficulty  Pt will improve hip ABD and ER strength to 4+/5 to increase ease with standing and walking   Pt will demonstrate improved SLS to > 2 seconds MERCEDES without UE support to promote safety and decrease risk of falls on uneven surfaces such as grass  Pt will perform TUG in <25 seconds, with LRAD demonstrating improved gait speed for improved  participation in ADL such as community ambulation  Pt will demonstrate increased LE strength and force production as evidenced by 8 reps on 30 sec STS.  Pt will be able to squat to  light objects around the house with <2/10 hip pain   Pt will improve functional hip strength to report ability to ascend/descend 5 stairs with use of handrail  Pt will be independent and compliant with comprehensive HEP to maintain progress achieved in PT    Plan: Address ROM deficits, cardiovascular/pulmonary conditioning, general LE strength for improved activity tolerance and return to PLOF.  Date: 6/18/2024  TX#: 2/10 Date:                 TX#: 3/ Date:                 TX#: 4/ Date:                 TX#: 5/ Date:   Tx#: 6/   THERE EX: 45 min  Warm up NU step level 1 Les only seat 10 7 min   Standing gastroc stretch 30 sec 3x incline board BUE support level 0  Manual stretching hamstring, piriformis, gluts 30 sec 3x   PROM L hip all planes within precautions 5 mins  Bridges 10x2 with ball between knees  SL hip flexor stretch 30 sec 3x  Hip ABD green band   STS 10x2 20 in mat table  Standing Hip ABD 10x each LE   Shuttle leg press 50 lbs BLEs 20x  Partial squat BUE Support 10x2                            HEP:  Access Code: [email or web address]  URL: [email or web address]  Date: 06/13/2024  Prepared by: Neena Aguilar     Exercises  - Seated Ankle Pumps on Table  - 1 x daily - 7 x weekly - 5 sets - 10 reps  - Supine Heel Slides  - 1 x daily - 7 x weekly - 3 sets - 10 reps  - Supine Bridge  - 1 x daily - 7 x weekly - 2 sets - 10 reps - 3 secs hold  - Sit to Stand with Armchair  - 1 x daily - 7 x weekly - 2 sets - 10 reps  - Seated Hip Abduction with Resistance  - 4 x weekly - 3 sets - 10 reps    Charges: 3 ther ex       Total Timed Treatment: 45 min  Total Treatment Time: 45 min

## 2024-06-20 ENCOUNTER — APPOINTMENT (OUTPATIENT)
Dept: PHYSICAL THERAPY | Age: 79
End: 2024-06-20
Attending: ORTHOPAEDIC SURGERY
Payer: MEDICARE

## 2024-06-24 ENCOUNTER — TELEPHONE (OUTPATIENT)
Dept: PHYSICAL THERAPY | Facility: HOSPITAL | Age: 79
End: 2024-06-24

## 2024-06-24 ENCOUNTER — APPOINTMENT (OUTPATIENT)
Dept: PHYSICAL THERAPY | Age: 79
End: 2024-06-24
Attending: ORTHOPAEDIC SURGERY
Payer: MEDICARE

## 2024-06-25 ENCOUNTER — PATIENT MESSAGE (OUTPATIENT)
Facility: CLINIC | Age: 79
End: 2024-06-25

## 2024-06-25 NOTE — TELEPHONE ENCOUNTER
LOV 06/17/2024  DOS    05/28/2024    LEFT POSTERIOR CEMENTED TOTAL HIP ARTHROPLASTY     Reporting that the incision site has opened and there is drainage. Using butterfly bandages to close and covered with regular bandaged.     Advised to keep covered.    Pt is currently on Levaquin 500 mg/Steroids after being released from hospital 06/22/2024- pneumonia, COPD, parainfluenza virus 3     Please advise if he should be seen in office?    Thanks~

## 2024-06-25 NOTE — TELEPHONE ENCOUNTER
From: Jordan Valencia  To: Shun Dominguez  Sent: 6/25/2024 11:22 AM CDT  Subject: Hip wound    Shun,   This is Handy Newell's daughter. Dad's hip wound is open and draining at the bottom. I have placed 2 butterfly bandages to keep the skin together and covered with a bandage to absorb the drainage. Dad is not scheduled to see Dr. Manuel until July 8th. Do you want to see him soon er than that?  I don't believe it is infected. He is on antibiotics for a respiratory infection.    Please let me know what you would recommend.   Thank you Osiris ANDERSEN for Handy Valencia

## 2024-06-26 ENCOUNTER — OFFICE VISIT (OUTPATIENT)
Dept: ORTHOPEDICS CLINIC | Facility: CLINIC | Age: 79
End: 2024-06-26

## 2024-06-26 VITALS — WEIGHT: 186 LBS | BODY MASS INDEX: 26.04 KG/M2 | HEIGHT: 71 IN

## 2024-06-26 DIAGNOSIS — Z96.642 STATUS POST LEFT HIP REPLACEMENT: Primary | ICD-10-CM

## 2024-06-26 PROCEDURE — 99024 POSTOP FOLLOW-UP VISIT: CPT | Performed by: PHYSICIAN ASSISTANT

## 2024-06-26 RX ORDER — LEVOFLOXACIN 500 MG/1
500 TABLET, FILM COATED ORAL DAILY
COMMUNITY
Start: 2024-06-22

## 2024-06-26 RX ORDER — CEPHALEXIN 500 MG/1
500 CAPSULE ORAL 3 TIMES DAILY
Qty: 42 CAPSULE | Refills: 0 | Status: SHIPPED | OUTPATIENT
Start: 2024-06-26 | End: 2024-07-10

## 2024-06-26 NOTE — PROGRESS NOTES
[facility] Progress Note    Subjective: Patient returns to clinic for incision check.  He was last seen on 6/17/2024 for postoperative visit status post left posterior cemented hip arthroplasty performed on 5/28/2024.  He is nearly 1 month out from surgery.  Since last visit, he was readmitted at [facility] from 6/19/2024 to 6/22/2024 due to shortness of breath.  He was given Lovenox injections for DVT prophylaxis while admitted at the hospital.  He was given vancomycin, cefepime to control pneumonia as well as Solu-Medrol for his breathing.      Patient's daughter reports she first noticed drainage from the incision last week, 6/19/2020 4 in the morning.  She described as bloody serous drainage that fully saturated the patient's bed sheets.  She has been doing daily dressing changes, noticing full saturation of the dressing at the distal portion that has steadily been improving after she applied butterfly bandages to the incision.  She has only noticed a small amount of drainage at the proximalmost aspect of the incision.    Objective: Proximal and distal portions of incision with drainage - distal aspect with most amount.  No palpable fluid collection could be felt deep to the incision. Upon removal of the Steri-Strips along the distal aspect of the incision, orange serous fluid began leaking. Firm squeeze at the distal aspect of the incision yields very small scant clear serosanguinous drainage.             The bandage shown in the photo was applied this morning.      Assessment/Plan: Discussed with the patient and his family that the drainage appears superficial without any obvious drainage from the hip joint and no evidence at this point in time for fluid collection/seroma deep to incision.  We discussed that this could be treated with continue wound care involving daily Betadine swabs, dressing changes.  Discussed with the patient and his daughter that he may shower, washing the incision and patting it  dry with a towel followed by application of Betadine paint and dressing.  Advised him to contact our office if increased drainage, change in characterization of drainage is noted or for any other concern.  Keflex was sent to the patient's pharmacy to be taken over the next 2 weeks, which point the patient will follow-up with Dr. Manuel for 6-week postoperative visit.  Dr. Manuel was present during the visit to help assess the patient in direct care.  Patient and his family's questions were sought and answered satisfactorily.  They are happy with the plan and will follow as advised.      Shun Dominguez PA-C  [facility]    This note was dictated using Dragon software.  While it was briefly proofread prior to completion, some grammatical, spelling, and word choice errors due to dictation may still occur.      Addendum: Patient evaluated with Shun Dominguez PA-C.  Drainage noted 1 week ago while admitted at an outside facility for respiratory issues.  Over the past week, drainage does seem to be decreasing day by day.  Drainage has been clear yellowish serosanguineous.  Dressing is changed once per day, the amount of drainage fills the bottom half of the dressing and no more.  No purulence, no fever or chills.  No increased pain around the hip.  He has been on Levaquin for concern for pulmonary infection.  On exam, as noted in pictures, dry area of fibrinous coating at the superior aspect, with 1 to 1.5 cm superficial appearing dehiscence distally.  There is no palpable fluctuance or induration, minimal expressible serous weeping from the wound edges.  Due to thinness of patient's soft tissues and thigh, able to palpate and confirm absence of deep fluid collection assuredly.  Clinically does not appear to be deep fluid collection, rather superficial dehiscence.  Patient is at elevated risk given multiple medical issues including recent higher dose steroid usage, hypoalbuminemia, smoking  history, other medical issues.  Discussed importance of preventing infection.  We did discuss topical Betadine over the wound, daily cleaning, dressing application.  Advised taking a picture of the dressing each day.  Patient's daughter is aware and familiar of signs and symptoms to look out for, and will contact us with worsening symptoms or drainage.  He is on Levaquin for another few days, will plan to start Keflex afterwards in order to help prevent infection along with the Betadine topical.  Follow-up in 2 weeks for reassessment, but call sooner if any concerns.  We did discuss that this would most likely heal by secondary intention from the bottom up.    Greg Manuel MD, FAAOS  [facility]  Phone [phone]  Fax [phone]

## 2024-06-27 ENCOUNTER — OFFICE VISIT (OUTPATIENT)
Dept: PHYSICAL THERAPY | Age: 79
End: 2024-06-27
Attending: ORTHOPAEDIC SURGERY
Payer: MEDICARE

## 2024-06-27 PROCEDURE — 97110 THERAPEUTIC EXERCISES: CPT

## 2024-06-27 NOTE — PROGRESS NOTES
Diagnosis:   Status post left hip replacement (Z96.642       Referring Provider: Greg Manuel  Date of Evaluation:    6/11/24    Precautions:   Posterior hip precautions until  7/8/24  Fall risk Next MD visit:   7/10/24 radha and 7/8/24 Kaleb  Date of Surgery: n/a   Insurance Primary/Secondary: [employer]ETCODI [facility] / N/A     # Auth Visits: Medical Necessity            Subjective: Pt and daughter report increased weakness following hospitalization for 5 days 2/2 SOB. He reports he feels tired and weak and some SOB at rest, but pulmonology is following, meds have remained the same.     Pain: 0/10       Objective:   6/27/24- Resting vitals SPO2 on RA: 91-80% HR 79 pt reports mod Trav 5/10 seated. Following Nu step:  mod trav 0/10 Spo2 76% HR 81 following supine to sit:  SPO2 on 80% HR 78 pt recovers to 90% sats]; with 60 secs of PLB and seated rest for each  6/18/24- Resting vitals: Spo2 on RA 90% HR 69 following Nu step 96% HR 76 within 30 sec rest 91% mod TRAV 3/10 following standing ex 92% HR 74 mod trav 3/10  All of the below objective measures are from evaluation for reference:  AROM: (* denotes performed with pain)  Hip   Flexion: R 92; L 65**  Extension: R WFL; L NT  Abduction: R 20; L 20*  ER: R WFL; L NT  IR: R WFL; L NT  Knee FLEX R 128 L 118 deg  EXT R 0 L 0 deg n     Balance: SLS: R 15 sec, L 10 sec each with single UE support, pt unable to balance without UEs   Functional Mobility:  30 sec sit<>stand: 6 reps UE leverage required Spo2 88% HR 79 recovers within 30 secs with PLB and seated rest to 92% HR 72  TUG (AD, time): 40 sec   RW    Assessment Reps reduced to 5 and 3 sets with emphasis on breath support throughout, mod to max cues for exhale with contraction.        Goals:    (To be met in 20 visits)   Pt will have improved hip AROM Flex to 90 deg and ABD to >26 deg to be able to don/doff shoes and perform car transfers without difficulty  Pt will improve hip ABD and ER strength to 4+/5 to increase ease with  standing and walking   Pt will demonstrate improved SLS to > 2 seconds MERCEDES without UE support to promote safety and decrease risk of falls on uneven surfaces such as grass  Pt will perform TUG in <25 seconds, with LRAD demonstrating improved gait speed for improved participation in ADL such as community ambulation  Pt will demonstrate increased LE strength and force production as evidenced by 8 reps on 30 sec STS.  Pt will be able to squat to  light objects around the house with <2/10 hip pain   Pt will improve functional hip strength to report ability to ascend/descend 5 stairs with use of handrail  Pt will be independent and compliant with comprehensive HEP to maintain progress achieved in PT    Plan: Address ROM deficits, cardiovascular/pulmonary conditioning, general LE strength for improved activity tolerance and return to PLOF.  Date: 6/18/2024  TX#: 2/10 Date: 6/27/24                TX#: 3/10 Date:                 TX#: 4/ Date:                 TX#: 5/ Date:   Tx#: 6/   THERE EX: 45 min  Warm up NU step level 1 Les only seat 10 7 min   Standing gastroc stretch 30 sec 3x incline board BUE support level 0  Manual stretching hamstring, piriformis, gluts 30 sec 3x   PROM L hip all planes within precautions 5 mins  Bridges 10x2 with ball between knees  SL hip flexor stretch 30 sec 3x  Hip ABD green band   STS 10x2 20 in mat table  Standing Hip ABD 10x each LE   Shuttle leg press 50 lbs BLEs 20x  Partial squat BUE Support 10x2 THERE EX:  Warm up 5 mins nu step level 5   Standing gastroc stretch 30 sec 3x incline board BUE support level 0  Manual stretching hamstring, piriformis, gluts 30 sec 3x   SL hip flexor stretch 30 sec 3x LLE  SL clams 5x3  Bridges 5x3 with ball between knees breath support   Hip ABD red band  5x3  SAQ 5x3   PROM L hip all planes within precautions 5 mins  STS 5x3 22 in mat table hands on table pt reports mod josé miguel 5/10  Partial squat BUE Support 5x3                            HEP:   Access Code: [email or web address]  URL: [email or web address]  Date: 06/13/2024  Prepared by: Neena Aguilar     Exercises  - Seated Ankle Pumps on Table  - 1 x daily - 7 x weekly - 5 sets - 10 reps  - Supine Heel Slides  - 1 x daily - 7 x weekly - 3 sets - 10 reps  - Supine Bridge  - 1 x daily - 7 x weekly - 2 sets - 10 reps - 3 secs hold  - Sit to Stand with Armchair  - 1 x daily - 7 x weekly - 2 sets - 10 reps  - Seated Hip Abduction with Resistance  - 4 x weekly - 3 sets - 10 reps    Charges: 3 ther ex       Total Timed Treatment: 45 min  Total Treatment Time: 45 min

## 2024-07-01 ENCOUNTER — OFFICE VISIT (OUTPATIENT)
Dept: PHYSICAL THERAPY | Age: 79
End: 2024-07-01
Attending: ORTHOPAEDIC SURGERY
Payer: MEDICARE

## 2024-07-01 PROCEDURE — 97112 NEUROMUSCULAR REEDUCATION: CPT

## 2024-07-01 PROCEDURE — 97110 THERAPEUTIC EXERCISES: CPT

## 2024-07-01 NOTE — PROGRESS NOTES
Diagnosis:   Status post left hip replacement (Z96.642       Referring Provider: Greg Manuel  Date of Evaluation:    6/11/24    Precautions:   Posterior hip precautions until  7/8/24  Fall risk Next MD visit:   7/10/24 radha and 7/8/24 Kaleb  Date of Surgery: n/a   Insurance Primary/Secondary: [facility] [facility] / N/A     # Auth Visits: Medical Necessity            Subjective: Pt co feeling tired, denies pain. He states he has been using the O2 during the day every day. He did not bring it to PT today. He attended a wedding reception this weekend and felt sick to his stomach all the way home.     Pain: 0/10       Objective:   7/1/24-Active vitals after 60 ft walk RW SPO2: 83% HR 79 following tapping: SPO2 on RA: 72% HR 75 recovers to 88% HR 77 within 120 sec seated rest   6/27/24- Resting vitals SPO2 on RA: 91-80% HR 79 pt reports mod Trav 5/10 seated. Following Nu step:  mod trav 0/10 Spo2 76% HR 81 following supine to sit:  SPO2 on 80% HR 78 pt recovers to 90% sats]; with 60 secs of PLB and seated rest for each  6/18/24- Resting vitals: Spo2 on RA 90% HR 69 following Nu step 96% HR 76 within 30 sec rest 91% mod TRAV 3/10 following standing ex 92% HR 74 mod trav 3/10  All of the below objective measures are from evaluation for reference:  AROM: (* denotes performed with pain)  Hip   Flexion: R 92; L 65**  Extension: R WFL; L NT  Abduction: R 20; L 20*  ER: R WFL; L NT  IR: R WFL; L NT  Knee FLEX R 128 L 118 deg  EXT R 0 L 0 deg n     Balance: SLS: R 15 sec, L 10 sec each with single UE support, pt unable to balance without UEs   Functional Mobility:  30 sec sit<>stand: 6 reps UE leverage required Spo2 88% HR 79 recovers within 30 secs with PLB and seated rest to 92% HR 72  TUG (AD, time): 40 sec   RW    Assessment Able to progress to more WB ther ex and walking this visit, limited by SOB and fatigue.      Goals:    (To be met in 20 visits)   Pt will have improved hip AROM Flex to 90 deg and ABD to >26 deg to be able to  don/doff shoes and perform car transfers without difficulty  Pt will improve hip ABD and ER strength to 4+/5 to increase ease with standing and walking   Pt will demonstrate improved SLS to > 2 seconds MERCEDES without UE support to promote safety and decrease risk of falls on uneven surfaces such as grass  Pt will perform TUG in <25 seconds, with LRAD demonstrating improved gait speed for improved participation in ADL such as community ambulation  Pt will demonstrate increased LE strength and force production as evidenced by 8 reps on 30 sec STS.  Pt will be able to squat to  light objects around the house with <2/10 hip pain   Pt will improve functional hip strength to report ability to ascend/descend 5 stairs with use of handrail  Pt will be independent and compliant with comprehensive HEP to maintain progress achieved in PT    Plan: Progress ROM, cardiovascular/pulmonary conditioning, general LE strength for improved activity tolerance and return to PLOF.  Date: 6/18/2024  TX#: 2/10 Date: 6/27/24                TX#: 3/10 Date: 7/1/24                TX#: 4/10 Date:                 TX#: 5/ Date:   Tx#: 6/ THERE EX: 45 min  Warm up NU step level 1 Les only seat 10 7 min   Standing gastroc stretch 30 sec 3x incline board BUE support level 0  Manual stretching hamstring, piriformis, gluts 30 sec 3x   PROM L hip all planes within precautions 5 mins  Bridges 10x2 with ball between knees  SL hip flexor stretch 30 sec 3x  Hip ABD green band   STS 10x2 20 in mat table  Standing Hip ABD 10x each LE   Shuttle leg press 50 lbs BLEs 20x  Partial squat BUE Support 10x2 THERE EX:  Warm up 5 mins nu step level 5   Standing gastroc stretch 30 sec 3x incline board BUE support level 0  Manual stretching hamstring, piriformis, gluts 30 sec 3x   SL hip flexor stretch 30 sec 3x LLE  SL clams 5x3  Bridges 5x3 with ball between knees breath support   Hip ABD red band  5x3  SAQ 5x3   PROM L hip all planes within precautions 5  mins  STS 5x3 22 in mat table hands on table pt reports mod josé miguel 5/10  Partial squat BUE Support 5x3  THERE EX: 32 min  Warm up held due to availability  Standing gastroc stretch 30 sec 3x incline board BUE support level 0  Manual stretching hamstring, piriformis, gluts 30 sec 3x   SL hip flexor stretch 30 sec 3x LLE  SL clams 5x3  Bridges 5x3 with ball between knees breath support   Hip ABD red band  5x3  SAQ 10x2  each LE  PROM L hip all planes within precautions 5 mins  STS 10x2 22 in mat table hands on table pt reports mod josé miguel 5/10  2 in stair ascent forward down backward RW BUE support 5x each LE progressing to 4 in stair 5x ea       NEURO RE ED: 10 min  Forward and lateral tapping 7x each LE BUE support   Backward tapping 5x2 each LE BUE support   Walking 65ft RW                    HEP:  Access Code: [email or web address]  URL: [email or web address]  Date: 06/13/2024  Prepared by: Neena Aguilar     Exercises  - Seated Ankle Pumps on Table  - 1 x daily - 7 x weekly - 5 sets - 10 reps  - Supine Heel Slides  - 1 x daily - 7 x weekly - 3 sets - 10 reps  - Supine Bridge  - 1 x daily - 7 x weekly - 2 sets - 10 reps - 3 secs hold  - Sit to Stand with Armchair  - 1 x daily - 7 x weekly - 2 sets - 10 reps  - Seated Hip Abduction with Resistance  - 4 x weekly - 3 sets - 10 reps    Charges: 2 ther ex 1 neuro re ed       Total Timed Treatment: 42 min  Total Treatment Time: 45 min

## 2024-07-02 ENCOUNTER — TELEPHONE (OUTPATIENT)
Facility: CLINIC | Age: 79
End: 2024-07-02

## 2024-07-02 NOTE — TELEPHONE ENCOUNTER
Xr's ordered for upcoming appointment     Patient aware to arrive 15-20 minutes earlier to complete images before seeing the provider as he was notified via [employer]     Xr's scheduled

## 2024-07-03 ENCOUNTER — MED REC SCAN ONLY (OUTPATIENT)
Dept: FAMILY MEDICINE CLINIC | Facility: CLINIC | Age: 79
End: 2024-07-03

## 2024-07-05 ENCOUNTER — OFFICE VISIT (OUTPATIENT)
Dept: PHYSICAL THERAPY | Age: 79
End: 2024-07-05
Attending: ORTHOPAEDIC SURGERY
Payer: MEDICARE

## 2024-07-05 PROCEDURE — 97110 THERAPEUTIC EXERCISES: CPT

## 2024-07-05 NOTE — PROGRESS NOTES
Diagnosis:   Status post left hip replacement (Z96.642       Referring Provider: Greg Manuel  Date of Evaluation:    6/11/24    Precautions:   Posterior hip precautions until  7/8/24  Fall risk Next MD visit:   7/10/24 radha and 7/8/24 Kaleb  Date of Surgery: n/a   Insurance Primary/Secondary: [facility]ETCODI [facility] / N/A     # Auth Visits: Medical Necessity            Subjective: Pt  reports he feels very tired, denies pain. States he has been using O2 daily along with breathing treatments.     Pain: 0/10       Objective:   7/5/240- SPO2 on RA 97% at rest, after nu step: 85% HR 79 mod bord 0/10  7/1/24-Active vitals after 60 ft walk RW SPO2: 83% HR 79 following tapping: SPO2 on RA: 72% HR 75 recovers to 88% HR 77 within 120 sec seated rest   6/27/24- Resting vitals SPO2 on RA: 91-80% HR 79 pt reports mod Trav 5/10 seated. Following Nu step:  mod trav 0/10 Spo2 76% HR 81 following supine to sit:  SPO2 on 80% HR 78 pt recovers to 90% sats]; with 60 secs of PLB and seated rest for each  6/18/24- Resting vitals: Spo2 on RA 90% HR 69 following Nu step 96% HR 76 within 30 sec rest 91% mod TRAV 3/10 following standing ex 92% HR 74 mod trav 3/10  All of the below objective measures are from evaluation for reference:  AROM: (* denotes performed with pain)  Hip   Flexion: R 92; L 65**  Extension: R WFL; L NT  Abduction: R 20; L 20*  ER: R WFL; L NT  IR: R WFL; L NT  Knee FLEX R 128 L 118 deg  EXT R 0 L 0 deg n     Balance: SLS: R 15 sec, L 10 sec each with single UE support, pt unable to balance without UEs   Functional Mobility:  30 sec sit<>stand: 6 reps UE leverage required Spo2 88% HR 79 recovers within 30 secs with PLB and seated rest to 92% HR 72  TUG (AD, time): 40 sec   RW    Assessment Pt with O2 concentrator this visit, improved tolerance and reps from 5 to 10. Pt does not report mod TRAV greater than 5/10 this visit.  Added more standing ther ex without increased co.       Goals:    (To be met in 20 visits)   Pt will have  improved hip AROM Flex to 90 deg and ABD to >26 deg to be able to don/doff shoes and perform car transfers without difficulty  Pt will improve hip ABD and ER strength to 4+/5 to increase ease with standing and walking   Pt will demonstrate improved SLS to > 2 seconds MERCEDES without UE support to promote safety and decrease risk of falls on uneven surfaces such as grass  Pt will perform TUG in <25 seconds, with LRAD demonstrating improved gait speed for improved participation in ADL such as community ambulation  Pt will demonstrate increased LE strength and force production as evidenced by 8 reps on 30 sec STS.  Pt will be able to squat to  light objects around the house with <2/10 hip pain   Pt will improve functional hip strength to report ability to ascend/descend 5 stairs with use of handrail  Pt will be independent and compliant with comprehensive HEP to maintain progress achieved in PT    Plan: Progress ROM, cardiovascular/pulmonary conditioning, general LE strength for improved activity tolerance and return to PLOF.  Date: 6/18/2024  TX#: 2/10 Date: 6/27/24                TX#: 3/10 Date: 7/1/24                TX#: 4/10 Date:  7/5/24               TX#: 5/10 Date:   Tx#: 6/ THERE EX: 45 min  Warm up NU step level 1 Les only seat 10 7 min   Standing gastroc stretch 30 sec 3x incline board BUE support level 0  Manual stretching hamstring, piriformis, gluts 30 sec 3x   PROM L hip all planes within precautions 5 mins  Bridges 10x2 with ball between knees  SL hip flexor stretch 30 sec 3x  Hip ABD green band   STS 10x2 20 in mat table  Standing Hip ABD 10x each LE   Shuttle leg press 50 lbs BLEs 20x  Partial squat BUE Support 10x2 THERE EX:  Warm up 5 mins nu step level 5   Standing gastroc stretch 30 sec 3x incline board BUE support level 0  Manual stretching hamstring, piriformis, gluts 30 sec 3x   SL hip flexor stretch 30 sec 3x LLE  SL clams 5x3  Bridges 5x3 with ball between knees breath support   Hip ABD  red band  5x3  SAQ 5x3   PROM L hip all planes within precautions 5 mins  STS 5x3 22 in mat table hands on table pt reports mod josé miguel 5/10  Partial squat BUE Support 5x3  THERE EX: 32 min  Warm up held due to availability  Standing gastroc stretch 30 sec 3x incline board BUE support level 0  Manual stretching hamstring, piriformis, gluts 30 sec 3x   SL hip flexor stretch 30 sec 3x LLE  SL clams 5x3  Bridges 5x3 with ball between knees breath support   Hip ABD red band  5x3  SAQ 10x2  each LE  PROM L hip all planes within precautions 5 mins  STS 10x2 22 in mat table hands on table pt reports mod josé miguel 5/10  2 in stair ascent forward down backward RW BUE support 5x each LE progressing to 4 in stair 5x ea THERE EX: 42 min  Warm up held due to availability  Standing gastroc stretch 30 sec 3x incline board BUE support level 1  Manual stretching hamstring, piriformis, gluts 30 sec 3x   SL hip flexor stretch 30 sec 3x LLE  SL clams 10x  Bridges 10x2 with ball between knees breath support   Hip ABD red band  10x2  SAQ 20x each LE  PROM L hip all planes within precautions 5 mins  TS 10x2 22 in mat table hands on table  4 in step up 10x BUE support LLE leading  Partial squat BUE support 10x  Hip ABD 10x each LE BUE support       NEURO RE ED: 10 min  Forward and lateral tapping 7x each LE BUE support   Backward tapping 5x2 each LE BUE support   Walking 65ft RW                    HEP:  Access Code: [email or web address]  URL: [email or web address]  Date: 06/13/2024  Prepared by: Neena Aguilar     Exercises  - Seated Ankle Pumps on Table  - 1 x daily - 7 x weekly - 5 sets - 10 reps  - Supine Heel Slides  - 1 x daily - 7 x weekly - 3 sets - 10 reps  - Supine Bridge  - 1 x daily - 7 x weekly - 2 sets - 10 reps - 3 secs hold  - Sit to Stand with Armchair  - 1 x daily - 7 x weekly - 2 sets - 10 reps  - Seated Hip Abduction with Resistance  - 4 x weekly - 3 sets - 10 reps    Charges: 3 ther ex       Total Timed Treatment: 42  min  Total Treatment Time: 45 min

## 2024-07-08 ENCOUNTER — TELEPHONE (OUTPATIENT)
Dept: PHYSICAL THERAPY | Facility: HOSPITAL | Age: 79
End: 2024-07-08

## 2024-07-08 ENCOUNTER — APPOINTMENT (OUTPATIENT)
Dept: PHYSICAL THERAPY | Age: 79
End: 2024-07-08
Attending: ORTHOPAEDIC SURGERY
Payer: MEDICARE

## 2024-07-09 ENCOUNTER — TELEPHONE (OUTPATIENT)
Dept: PHYSICAL THERAPY | Facility: HOSPITAL | Age: 79
End: 2024-07-09

## 2024-07-11 ENCOUNTER — APPOINTMENT (OUTPATIENT)
Dept: PHYSICAL THERAPY | Age: 79
End: 2024-07-11
Attending: ORTHOPAEDIC SURGERY
Payer: MEDICARE

## 2024-07-15 ENCOUNTER — APPOINTMENT (OUTPATIENT)
Dept: PHYSICAL THERAPY | Age: 79
End: 2024-07-15
Attending: ORTHOPAEDIC SURGERY
Payer: MEDICARE

## 2024-07-16 ENCOUNTER — TELEPHONE (OUTPATIENT)
Dept: FAMILY MEDICINE CLINIC | Facility: CLINIC | Age: 79
End: 2024-07-16

## 2024-07-16 NOTE — TELEPHONE ENCOUNTER
Rosemarie - the  RN called to verify that Dr. Julien is agreeable to starting skilled nursing, Speech and PATIENT/OT    Per verbal conversation with Dr. Julien he was agreeable    Rosemarie was advised

## 2024-07-17 ENCOUNTER — TELEPHONE (OUTPATIENT)
Dept: FAMILY MEDICINE CLINIC | Facility: CLINIC | Age: 79
End: 2024-07-17

## 2024-07-17 NOTE — TELEPHONE ENCOUNTER
Pt fell this morning, head went into the wall, no apparent deficit, bp is 80/50, pt is alert and oriented x 3.

## 2024-07-17 NOTE — TELEPHONE ENCOUNTER
Walking to the bathroom- lost balance and fell    Hit the wall- with head. Indent in the wall    Woke daughter up this morning- chills, diaphoretic and fever. Daughter did give Tylenol this morning.    Vitals:      Blood Pressure 80/50 and then 87/48 at 1030    Metoprolol 12.5 at night  ASA 81mg twice a day    Daughter did Neuro Check A/O X3 per daughter    Bruise on his head- but denies any open wounds    Sugars 248 this morning- fasting.     RN spoke with Dr. Julien- he advises patient needs to go to the Emergency Room to be evaluated and get fluids. We need to make sure he does not have an acute finding in his brain from fall.    Daughter advised- verbalized understanding

## 2024-07-18 ENCOUNTER — APPOINTMENT (OUTPATIENT)
Dept: PHYSICAL THERAPY | Age: 79
End: 2024-07-18
Attending: ORTHOPAEDIC SURGERY
Payer: MEDICARE

## 2024-07-18 ENCOUNTER — TELEPHONE (OUTPATIENT)
Dept: FAMILY MEDICINE CLINIC | Facility: CLINIC | Age: 79
End: 2024-07-18

## 2024-07-18 NOTE — TELEPHONE ENCOUNTER
PAUL FROM HOME HEALTH CALLED AND ADV THAT HE NEEDS A DELAY OF PHYSICAL THERAPY  CARE ORDER     7/21/24    PLEASE ADV    THANK YOU

## 2024-07-22 ENCOUNTER — APPOINTMENT (OUTPATIENT)
Dept: PHYSICAL THERAPY | Age: 79
End: 2024-07-22
Attending: ORTHOPAEDIC SURGERY
Payer: MEDICARE

## 2024-07-29 ENCOUNTER — APPOINTMENT (OUTPATIENT)
Dept: PHYSICAL THERAPY | Age: 79
End: 2024-07-29
Attending: ORTHOPAEDIC SURGERY
Payer: MEDICARE

## 2024-08-01 ENCOUNTER — MED REC SCAN ONLY (OUTPATIENT)
Dept: FAMILY MEDICINE CLINIC | Facility: CLINIC | Age: 79
End: 2024-08-01

## 2024-08-01 ENCOUNTER — APPOINTMENT (OUTPATIENT)
Dept: PHYSICAL THERAPY | Age: 79
End: 2024-08-01
Attending: ORTHOPAEDIC SURGERY
Payer: MEDICARE

## 2024-08-05 ENCOUNTER — APPOINTMENT (OUTPATIENT)
Dept: PHYSICAL THERAPY | Age: 79
End: 2024-08-05
Attending: ORTHOPAEDIC SURGERY
Payer: MEDICARE

## 2024-08-08 ENCOUNTER — APPOINTMENT (OUTPATIENT)
Dept: PHYSICAL THERAPY | Age: 79
End: 2024-08-08
Attending: ORTHOPAEDIC SURGERY
Payer: MEDICARE

## 2024-08-12 ENCOUNTER — HOME HEALTH CHARGES (OUTPATIENT)
Dept: FAMILY MEDICINE CLINIC | Facility: CLINIC | Age: 79
End: 2024-08-12

## 2024-08-12 ENCOUNTER — APPOINTMENT (OUTPATIENT)
Dept: PHYSICAL THERAPY | Age: 79
End: 2024-08-12
Attending: ORTHOPAEDIC SURGERY
Payer: MEDICARE

## 2024-08-12 DIAGNOSIS — J69.0 PNEUMONITIS DUE TO INHALATION OF FOOD OR VOMITUS (HCC): Primary | ICD-10-CM

## 2024-08-15 ENCOUNTER — APPOINTMENT (OUTPATIENT)
Dept: PHYSICAL THERAPY | Age: 79
End: 2024-08-15
Attending: ORTHOPAEDIC SURGERY
Payer: MEDICARE

## 2024-08-19 ENCOUNTER — APPOINTMENT (OUTPATIENT)
Dept: PHYSICAL THERAPY | Age: 79
End: 2024-08-19
Attending: ORTHOPAEDIC SURGERY
Payer: MEDICARE

## 2024-08-22 ENCOUNTER — APPOINTMENT (OUTPATIENT)
Dept: PHYSICAL THERAPY | Age: 79
End: 2024-08-22
Attending: ORTHOPAEDIC SURGERY
Payer: MEDICARE

## 2024-08-26 ENCOUNTER — APPOINTMENT (OUTPATIENT)
Dept: PHYSICAL THERAPY | Age: 79
End: 2024-08-26
Attending: ORTHOPAEDIC SURGERY
Payer: MEDICARE

## 2024-08-29 ENCOUNTER — APPOINTMENT (OUTPATIENT)
Dept: PHYSICAL THERAPY | Age: 79
End: 2024-08-29
Attending: ORTHOPAEDIC SURGERY
Payer: MEDICARE

## 2024-09-03 ENCOUNTER — APPOINTMENT (OUTPATIENT)
Dept: PHYSICAL THERAPY | Age: 79
End: 2024-09-03
Attending: ORTHOPAEDIC SURGERY
Payer: MEDICARE

## 2024-09-05 ENCOUNTER — APPOINTMENT (OUTPATIENT)
Dept: PHYSICAL THERAPY | Age: 79
End: 2024-09-05
Attending: ORTHOPAEDIC SURGERY
Payer: MEDICARE

## 2024-09-06 ENCOUNTER — TELEPHONE (OUTPATIENT)
Facility: CLINIC | Age: 79
End: 2024-09-06

## 2024-09-06 DIAGNOSIS — M25.552 LEFT HIP PAIN: Primary | ICD-10-CM

## 2024-09-09 ENCOUNTER — HOSPITAL ENCOUNTER (OUTPATIENT)
Dept: GENERAL RADIOLOGY | Age: 79
Discharge: HOME | End: 2024-09-09
Attending: PHYSICIAN ASSISTANT
Payer: MEDICARE

## 2024-09-09 ENCOUNTER — APPOINTMENT (OUTPATIENT)
Dept: PHYSICAL THERAPY | Age: 79
End: 2024-09-09
Attending: ORTHOPAEDIC SURGERY
Payer: MEDICARE

## 2024-09-09 ENCOUNTER — OFFICE VISIT (OUTPATIENT)
Facility: CLINIC | Age: 79
End: 2024-09-09
Payer: MEDICARE

## 2024-09-09 VITALS — HEIGHT: 71 IN | WEIGHT: 179 LBS | BODY MASS INDEX: 25.06 KG/M2

## 2024-09-09 DIAGNOSIS — Z96.642 STATUS POST LEFT HIP REPLACEMENT: Primary | ICD-10-CM

## 2024-09-09 DIAGNOSIS — M25.552 LEFT HIP PAIN: ICD-10-CM

## 2024-09-09 PROCEDURE — 73502 X-RAY EXAM HIP UNI 2-3 VIEWS: CPT | Performed by: PHYSICIAN ASSISTANT

## 2024-09-09 PROCEDURE — 99212 OFFICE O/P EST SF 10 MIN: CPT | Performed by: PHYSICIAN ASSISTANT

## 2024-09-09 RX ORDER — MORPHINE SULFATE 20 MG/ML
SOLUTION ORAL
COMMUNITY
Start: 2024-08-03

## 2024-09-09 NOTE — TELEPHONE ENCOUNTER
XR ordered and scheduled per Ortho protocol.   SPOKE TO PATIENT'S DAUGHTER to inform them and ask them to arrive 15-20 minutes prior to appointment with Shun.

## 2024-09-09 NOTE — PROGRESS NOTES
[facility] Progress Note    Subjective: Patient returns to clinic a little over 3 months out from left posterior total hip arthroplasty performed on 5/28/2024.  From a hip standpoint, the patient is doing exceptionally well.  He denies any significant pain around the hip.  He is ambulating with a rolling walker but at times independently.  His daughter reports the incision is healed up very nicely.  There is a tiny opening along the distal portion of the incision but there is essentially no drainage the patient's daughter reports.  The patient himself is only using oxygen at nighttime now.  He does receive home hospice services as he no longer wishes to be admitted to the hospital if he becomes ill.  He is very happy with his progress from a hip standpoint.    Objective: Patient alert and oriented.  Ambulating with a rolling walker in clinic today.  Incision is clean, dry, and intact with a small pinhole sized opening at the very distalmost portion of the incision without any expressible drainage or fluctuance felt around the incision.        Imaging: Radiographs of the left hip obtained today personally viewed, independently interpreted and radiology report read.  Radiographs demonstrate stable appearance of surgical hardware with well cemented stem and no evidence of loosening.    Assessment/Plan: 79-year-old male now approximately 3-month status post left posterior cemented total hip arthroplasty, doing very well.  Discussed activities as tolerated at this point in time.  He may wean from assistive device as tolerated.  Will continue to monitor the incision to ensure full healing, and if there are any concerns they may reach out to our office.  Recommended follow-up 1 year from the date of surgery with repeat radiographs with Dr. Manuel or sooner if any concerns.  Patient and his daughter's questions were sought and answered satisfactorily.  They are happy with the plan and will follow as  advised.      Shun Dominguez PA-C  [facility]    This note was dictated using Dragon software.  While it was briefly proofread prior to completion, some grammatical, spelling, and word choice errors due to dictation may still occur.

## 2024-09-12 ENCOUNTER — APPOINTMENT (OUTPATIENT)
Dept: PHYSICAL THERAPY | Age: 79
End: 2024-09-12
Attending: ORTHOPAEDIC SURGERY
Payer: MEDICARE

## 2024-09-16 ENCOUNTER — APPOINTMENT (OUTPATIENT)
Dept: PHYSICAL THERAPY | Age: 79
End: 2024-09-16
Attending: ORTHOPAEDIC SURGERY
Payer: MEDICARE

## 2024-09-19 ENCOUNTER — APPOINTMENT (OUTPATIENT)
Dept: PHYSICAL THERAPY | Age: 79
End: 2024-09-19
Attending: ORTHOPAEDIC SURGERY
Payer: MEDICARE

## 2024-09-23 ENCOUNTER — APPOINTMENT (OUTPATIENT)
Dept: PHYSICAL THERAPY | Age: 79
End: 2024-09-23
Attending: ORTHOPAEDIC SURGERY
Payer: MEDICARE

## 2024-09-23 RX ORDER — FLUTICASONE FUROATE AND VILANTEROL TRIFENATATE 200; 25 UG/1; UG/1
1 POWDER RESPIRATORY (INHALATION) DAILY
Qty: 60 EACH | Refills: 5 | Status: SHIPPED | OUTPATIENT
Start: 2024-09-23

## 2024-09-23 NOTE — TELEPHONE ENCOUNTER
Asthma & COPD Medication Protocol [id number]/22/2024 07:04 AM   Protocol Details Asthma Action Score greater than or equal to 20    AAP/ACT given in last 12 months    Appointment in past 6 or next 3 months     Routing to provider per protocol.   LOI ELLIPTA 200-25 MCG/ACT Inhalation Aerosol Powder, Breath Activated   Last refilled on 12/29/23 for #60  with 5 rf.   Last labs 8/1/24.   Last seen on 4/25/24.     No future appointments.       Thank you.

## 2024-09-26 ENCOUNTER — APPOINTMENT (OUTPATIENT)
Dept: PHYSICAL THERAPY | Age: 79
End: 2024-09-26
Attending: ORTHOPAEDIC SURGERY
Payer: MEDICARE

## 2024-12-03 ENCOUNTER — MED REC SCAN ONLY (OUTPATIENT)
Dept: FAMILY MEDICINE CLINIC | Facility: CLINIC | Age: 79
End: 2024-12-03

## 2024-12-10 DIAGNOSIS — E11.9 CONTROLLED TYPE 2 DIABETES MELLITUS WITHOUT COMPLICATION, WITHOUT LONG-TERM CURRENT USE OF INSULIN (HCC): ICD-10-CM

## 2024-12-11 RX ORDER — BLOOD SUGAR DIAGNOSTIC
1 STRIP MISCELLANEOUS 2 TIMES DAILY
Qty: 200 STRIP | Refills: 1 | Status: SHIPPED | OUTPATIENT
Start: 2024-12-11

## 2024-12-11 NOTE — TELEPHONE ENCOUNTER
Diabetic Supplies Protocol Jlcohw00/10/2024 06:02 PM   Protocol Details In person appointment or virtual visit in the past 12 mos or appointment in next 3 mos

## 2024-12-30 RX ORDER — IPRATROPIUM BROMIDE AND ALBUTEROL SULFATE 2.5; .5 MG/3ML; MG/3ML
3 SOLUTION RESPIRATORY (INHALATION) EVERY 6 HOURS PRN
Qty: 90 ML | Refills: 5 | Status: SHIPPED | OUTPATIENT
Start: 2024-12-30

## 2024-12-30 NOTE — TELEPHONE ENCOUNTER
Pt failed refill protocol for the following reasons:  Requested Renewals     Name from pharmacy: IPRAT-ALBUT 0.5-3(2.5) MG/3 ML         Will file in chart as: IPRATROPIUM-ALBUTEROL 0.5-2.5 (3) MG/3ML Inhalation Solution    Sig: TAKE 3 ML BY NEBULIZATION EVERY 6 HOURS AS NEEDED    Disp: 90 mL    Refills: 5    Start: 12/30/2024    Class: Normal    Non-formulary    Last ordered: 8 months ago (4/30/2024) by Maco Julien DO    Last refill: 11/8/2024    Rx #: [id number]    Asthma & COPD Medication Protocol Azsrxk7112/30/2024 09:41 AM   Protocol Details Appointment in past 6 or next 3 months      To be filled at: [address] [phone], [phone]            Last refill: NA  Last appt: 5/8/25  Next appt: No future appointments.      Forward to Dr. Julien, please advise on refills. Thank you.

## 2025-01-29 ENCOUNTER — MED REC SCAN ONLY (OUTPATIENT)
Dept: FAMILY MEDICINE CLINIC | Facility: CLINIC | Age: 80
End: 2025-01-29

## (undated) DIAGNOSIS — R13.10 SWALLOWING IMPAIRMENT: Primary | ICD-10-CM

## (undated) DEVICE — SUT ETHBND XL 2 30IN V-37 NABSRB GRN 40MM 1/2

## (undated) DEVICE — PILLOW ABD SM W12XH6XL18IN LEG FOAM NYLEX CVR

## (undated) DEVICE — CONTAINER,SPECIMEN,PNEU TUBE,4OZ,OR STRL: Brand: MEDLINE

## (undated) DEVICE — SOLUTION IRRIG 3000ML 0.9% NACL FLX CONT

## (undated) DEVICE — SUT ETHIBOND 5 V-37 MB66G

## (undated) DEVICE — 3M™ STERI-STRIP™ REINFORCED ADHESIVE SKIN CLOSURES, R1548, 1 IN X 5 IN (25 MM X 125 MM), 4 STRIPS/ENVELOPE: Brand: 3M™ STERI-STRIP™

## (undated) DEVICE — GLOVE SUR 8.5 SENSICARE PI PIP GRN PWD F

## (undated) DEVICE — HOOD: Brand: FLYTE

## (undated) DEVICE — SCREWS PACK: Brand: KNEE INSTRUMENTS

## (undated) DEVICE — ISOPROPYL ALCOHOL 70% 4OZ BTL

## (undated) DEVICE — SYRINGE MED 30ML STD CLR PLAS LL TIP N CTRL

## (undated) DEVICE — DRESS WOUND AQUACEL 3.5INX12IN

## (undated) DEVICE — SOLUTION IRRIG 1000ML 0.9% NACL USP BTL

## (undated) DEVICE — BIT DRL 3.2X30MM HIP DISP FOR 2

## (undated) DEVICE — GLOVE SUR 7.5 SENSICARE PI PIP GRN PWD F

## (undated) DEVICE — LAPAROTOMY SPONGE - RF AND X-RAY DETECTABLE PRE-WASHED: Brand: SITUATE

## (undated) DEVICE — Device: Brand: STABLECUT®

## (undated) DEVICE — GOWN,SIRUS,FABRIC-REINFORCED,X-LARGE: Brand: MEDLINE

## (undated) DEVICE — 3M™ IOBAN™ 2 ANTIMICROBIAL INCISE DRAPE 6651EZ: Brand: IOBAN™ 2

## (undated) DEVICE — SHORT THREADED PINS PACK: Brand: KNEE INSTRUMENTS

## (undated) DEVICE — SYSTEM VAC MIX 3 CLEARMIX

## (undated) DEVICE — GAUZE PK 2INX3YD COT FAN FLD RADPQ INNR

## (undated) DEVICE — STERILE POLYISOPRENE POWDER-FREE SURGICAL GLOVES WITH EMOLLIENT COATING: Brand: PROTEXIS

## (undated) DEVICE — STERILE POLYISOPRENE POWDER-FREE SURGICAL GLOVES: Brand: PROTEXIS

## (undated) DEVICE — DRAPE,U/SHT,SPLIT,FILM,60X84,STERILE: Brand: MEDLINE

## (undated) DEVICE — ZZ- DISC- NOSUB-SOLUTION RUBBING 4OZ 70% ISO ALC CLR

## (undated) DEVICE — GLOVE SUR 8 SENSICARE PI PIP CRM PWD F

## (undated) DEVICE — FEMORAL CANAL BRUSH, IRRIGATION/SUCTION

## (undated) DEVICE — TOWEL,OR,DSP,ST,BLUE,DLX,2/PK,40PK/CS: Brand: MEDLINE

## (undated) DEVICE — DISPOSABLE TOURNIQUET CUFF SINGLE BLADDER, DUAL PORT AND QUICK CONNECT CONNECTOR: Brand: COLOR CUFF

## (undated) DEVICE — HOOD, PEEL-AWAY: Brand: FLYTE

## (undated) DEVICE — COATED BRAIDED POLYESTER: Brand: TI-CRON

## (undated) DEVICE — LIGHT HANDLE

## (undated) DEVICE — NEEDLE SPNL 18GA L3.5IN PNK QNCKE STYL DISP

## (undated) DEVICE — BANDAGE COHESIVE 4INX5YD TAN E POR SLF ADH

## (undated) DEVICE — SWORD PIN PACK: Brand: KNEE INSTRUMENTS

## (undated) DEVICE — ANTIBACTERIAL UNDYED BRAIDED (POLYGLACTIN 910), SYNTHETIC ABSORBABLE SUTURE: Brand: COATED VICRYL

## (undated) DEVICE — GOWN AERO CHROME XXL

## (undated) DEVICE — SUT ETHBND XL 5 30IN V-37 NABSRB GRN 40MM 1/2

## (undated) DEVICE — GMK SPHERE KNEE: Type: IMPLANTABLE DEVICE

## (undated) DEVICE — TUBING MEGADYNE SPECULUM

## (undated) DEVICE — PREMIUM WET SKIN PREP TRAY: Brand: MEDLINE INDUSTRIES, INC.

## (undated) DEVICE — SUT COAT VCRL 0 27IN CP-1 ABSRB UD 36MM 1/2

## (undated) DEVICE — INTENDED TO AID IN THE PASSING OF SUTURES THROUGH BONE AND SOFT TISSUE DURING ORTHOPEDIC SURGERY: Brand: HOFFEE SUTURE RETRIEVER

## (undated) DEVICE — ELECTRODE ES L16.5CM BLDE MPLR OPN APPRCH EZ

## (undated) DEVICE — THREADED PINS PACK: Brand: KNEE INSTRUMENTS

## (undated) DEVICE — SYRINGE 30ML LL TIP

## (undated) DEVICE — TIP CLEANER: Brand: VALLEYLAB

## (undated) DEVICE — ADHESIVE SKIN TOP FOR WND CLSR DERMBND ADV

## (undated) DEVICE — TOTAL HIP CDS: Brand: MEDLINE INDUSTRIES, INC.

## (undated) DEVICE — 1010 S-DRAPE TOWEL DRAPE 10/BX: Brand: STERI-DRAPE™

## (undated) DEVICE — PENCIL ES BTTN SWCH W/ TIP HOLSTER E-Z CLN

## (undated) DEVICE — BOWL CEMENT MIX QUICK-VAC

## (undated) DEVICE — E-Z BUTTON SWITCH PENCIL

## (undated) DEVICE — SUT VICRYL 2-0 CP-1 J266H

## (undated) DEVICE — COVER LT HNDL RIG FOR SUR CAM DISP

## (undated) DEVICE — SURGICAL SCRB HIBICLENS 4OZ

## (undated) DEVICE — UNIVERSAL STERIBUMP® STERILE (5/CASE): Brand: UNIVERSAL STERIBUMP®

## (undated) DEVICE — CONT SPEC 4OZ POLYPR GRAD LEAK

## (undated) DEVICE — BNDG COHESIVE W4INXL5YD TAN E

## (undated) DEVICE — DRESSING ANTIMIC 3.5 X 12 IN AQCEL AG ADVNTG

## (undated) DEVICE — GLOVE SUR 7.5 SENSICARE PI PIP CRM PWD F

## (undated) DEVICE — NEEDLE SPINAL 18X3-1/2 PINK.

## (undated) DEVICE — WRAP COOLING KNEE W/ICE PILLOW

## (undated) DEVICE — SLEEVE KENDALL SCD EXPRESS MED

## (undated) DEVICE — MLPD DISPOSABLE PAD (6' ROLL) 3 ROLLS: Brand: SCHAERER MEDICAL USA

## (undated) DEVICE — SMOOTH PINS PACK: Brand: KNEE INSTRUMENTS

## (undated) DEVICE — SUT MCRYL 4-0 18IN PS-2 ABSRB UD 19MM 3/8 CIR

## (undated) DEVICE — SLEEVE COMPR MD KNEE LEN SGL USE KENDALL SCD

## (undated) DEVICE — SOLUTION  .9 3000ML

## (undated) DEVICE — TOWEL SURG OR 17X30IN BLUE

## (undated) DEVICE — PADDING CAST COTTON  4

## (undated) DEVICE — SOLUTION SCRB 4OZ 4% CHG ANTISEPSIS HIBICLN

## (undated) DEVICE — SCRUB PVP -1 PREP SOLUTION 4OZ

## (undated) DEVICE — WRAP HIP COMPR

## (undated) NOTE — LETTER
OUTSIDE TESTING RESULT REQUEST     IMPORTANT: FOR YOUR IMMEDIATE ATTENTION  Please FAX all test results listed below to: [phone]     Testing already done on or about:      * * * * If testing is NOT complete, arrange with patient A.S.A.P. * * * *      Patient Name: Jordan Valencia  Surgery Date: 2024  Medical Record: [id number]  CSN: [id number]  : 1945 - A: 79 y     Sex: male  Surgeon(s):  Greg Manuel MD  Procedure: LEFT POSTERIOR CEMENTED TOTAL HIP ARTHROPLASTY  Anesthesia Type: Choice     Surgeon: Greg Manuel MD     The following Testing and Time Line are REQUIRED PER ANESTHESIA     EKG READ AND SIGNED WITHIN   90 days      Thank You,   Sent by: Lizbeth DANIEL

## (undated) NOTE — LETTER
24    Orthopedic Surgery   Pre-Operative Clearance Request    Patient Name:   Jordan Valencia             :   1945    Surgeon: Dr. Manuel             Date of Surgery: 24    Surgical Procedure: LEFT POSTERIOR CEMENTED TOTAL ARTHROPLASTY.       Please complete all of the following 2-3 weeks PRIOR TO your scheduled surgery to avoid potential cancellation.     [x]  History and Physical       [x]  Medical  Clearance                     Required pre-op testing to be ordered:                        [x]  CBC W/Diff                                                                   [x]  CMP                                                                            [x]  PT/INR    [x]  PTT     [x]  Type and Screen                      **Please fax test results, H&P, and clearance to [phone] and to P.A.T at [phone]**

## (undated) NOTE — LETTER
22  [facility]  Pre-Operative Clearance Request    Patient Name:   Dilshad Quick             :       Surgeon: Dr. Kirstin Mccurdy             Date of Surgery: 22    Surgical Procedure: LEFT TOTAL KNEE ARTHROPLASTY. Please Complete: 2-3 WEEKS PRIOR TO SURGERY TO AVOID CANCELLATION.      [x]  History and Physical    [x]  Medical Clearance    []  Cardiac Clearance                 []  Other:       Testing:      []  Chest X-Ray                                                               [x]  PT/INR    [x]  CBC W/Diff                                                                [x]  PTT     [x]  CMP                                                                            []  Sed Rate and CRP                  []  EKG 12 Lead                                                              [x]  Type and Screen                     []  Hemoglobin A1C                                                      []  Urinalysis w/ culture and reflex       [x]  MRSA/MSSA Culture at [facility]/PAT                   []  Other:                                  **Please fax test results, H&P, and clearance notes to [phone]**

## (undated) NOTE — LETTER
OUTSIDE TESTING RESULT REQUEST     IMPORTANT: FOR YOUR IMMEDIATE ATTENTION  Please FAX all test results listed below to: [phone]     Testing already done on or about: 2024     * * * * If testing is NOT complete, arrange with patient A.S.A.P. * * * *      Patient Name: Jordan Valencia  Surgery Date: 2024  Medical Record: [id number]  CSN: [id number]  : 1945 - A: 79 y     Sex: male  Surgeon(s):  Greg Manuel MD  Procedure: LEFT POSTERIOR CEMENTED TOTAL HIP ARTHROPLASTY  Anesthesia Type: Choice     Surgeon: Greg Manuel MD     The following Testing and Time Line are REQUIRED PER ANESTHESIA     EKG READ AND SIGNED WITHIN   90 days      Thank You,   Sent by: Lizbeth DANIEL

## (undated) NOTE — LETTER
Jordan Valencia   [address] [facility] [address]           Dear Jordan Valencia     Our records indicate that you have outstanding lab work and or testing that was ordered for you and has not yet been completed:  Lab Frequency Next Occurrence   Hemoglobin A1C [E] Once 11/30/2023      To provide you with the best possible care, please complete these orders at your earliest convenience. If you have recently completed these orders please disregard this letter.     If you have any questions please call the office at [phone].     Thank you,     [facility]

## (undated) NOTE — Clinical Note
FYI: TCM completed. NCM attempted to schedule TCM/HFU, patient's daughter Osiris declined will call office. TE to PCP office re: appointment. Thank you.

## (undated) NOTE — LETTER
OSR/YULISSA Notification    Patient Name: Bismark Moise CSN: [id number]  -Age / Sex: 1945-A: 68 y  male Medical Records: [id number]    Surgeon(s):  Surgeon(s):  Gladys Garcia MD   Procedure: LEFT TOTAL KNEE ARTHROPLASTY. Anesthesia Type: Choice Surgery Date: 10/14/22    During the pre-operative screening process using the [employer]ang questionnaire, the patient listed above was identified as being at high risk for obstructive sleep apnea. If you feel it is appropriate to schedule a sleep study, the [facility] will give priority to patients scheduled for procedures to minimize surgical delays. If a sleep study is completed prior to surgery, please fax the results/plan of care to Polly Yepez ([phone]) as this information will be utilized in clinical decision-making regarding the patient's upcoming surgery. Thank you for your assistance in helping us provide a safe, seamless and personal experience for your patient.     Maxim Kearns MD, PhD  [facility], Department of Anesthesia  [facility], Sleep Apnea Task Force

## (undated) NOTE — LETTER
03/15/21          [address] [place]   [address] [facility]  [place] [address]           Dear [address] [place]     Our records indicate that you have outstanding lab work and or testing that was ordered for you and has not yet been completed:  Lab Frequency N

## (undated) NOTE — LETTER
OUTSIDE TESTING RESULT REQUEST     IMPORTANT: FOR YOUR IMMEDIATE ATTENTION  Please FAX all test results listed below to: [phone]      * * * * If testing is NOT complete, arrange with patient A.S.A.P. * * * *      Patient Name: Amari Garcia  Surgery Date: 10/14/2022  CSN: [id number]  Medical Record: [id number]   : 1945 - A: 68 y      Sex: male  Surgeon(s):  Pancho Ramirez MD  Procedure: LEFT TOTAL KNEE ARTHROPLASTY.   Anesthesia Type: Choice     Surgeon: Pancho Ramirez MD     The following Testing and Time Line are REQUIRED PER ANESTHESIA     EKG READ AND SIGNED WITHIN   90 days  PT/INR within  30 days  PTT within  30 days  Type and Screen for Pre-Admission Testing (must be within 28 days of surgery)  MSSA/MRSA Nasal screening within 30 days      Thank You,   Sent by: Otto Almendarez RN

## (undated) NOTE — LETTER
04/05/21          [address] [place]   [address] [place] [address]           Dear [address] [place]     Our records indicate that you have outstanding lab work and or testing that was ordered for you and has not yet been completed:  Lab Frequency N

## (undated) NOTE — LETTER
24    Orthopedic Surgery   Pre-Operative Clearance Request    Patient Name:   Jordan Valencia             :   1945    Surgeon: Dr. Manuel             Date of Surgery: 24    Surgical Procedure: LEFT POSTERIOR CEMENTED TOTAL HIP ARTHROPLASTY.       Please complete all of the following 2-3 weeks PRIOR TO your scheduled surgery to avoid potential cancellation.                          [x]  Other: PULMONARY CLEARANCE       **Please fax test results, H&P, and clearance to [phone] and to P.A.T at [phone]**